# Patient Record
Sex: FEMALE | Race: WHITE | NOT HISPANIC OR LATINO | Employment: FULL TIME | URBAN - METROPOLITAN AREA
[De-identification: names, ages, dates, MRNs, and addresses within clinical notes are randomized per-mention and may not be internally consistent; named-entity substitution may affect disease eponyms.]

---

## 2020-03-17 ENCOUNTER — TRANSCRIBE ORDERS (OUTPATIENT)
Dept: ADMINISTRATIVE | Facility: HOSPITAL | Age: 30
End: 2020-03-17

## 2020-03-17 DIAGNOSIS — N63.15 BREAST LUMP ON RIGHT SIDE AT 9 O'CLOCK POSITION: Primary | ICD-10-CM

## 2020-03-18 ENCOUNTER — HOSPITAL ENCOUNTER (OUTPATIENT)
Dept: ULTRASOUND IMAGING | Facility: HOSPITAL | Age: 30
Discharge: HOME | End: 2020-03-18
Admitting: OBSTETRICS & GYNECOLOGY

## 2020-03-18 DIAGNOSIS — N63.15 BREAST LUMP ON RIGHT SIDE AT 9 O'CLOCK POSITION: ICD-10-CM

## 2020-03-18 PROCEDURE — 76642 ULTRASOUND BREAST LIMITED: CPT

## 2020-03-18 PROCEDURE — 76642 ULTRASOUND BREAST LIMITED: CPT | Performed by: RADIOLOGY

## 2021-04-16 DIAGNOSIS — Z30.41 ENCOUNTER FOR SURVEILLANCE OF CONTRACEPTIVE PILLS: Primary | ICD-10-CM

## 2021-04-16 RX ORDER — NORETHINDRONE ACETATE AND ETHINYL ESTRADIOL 1MG-20(24)
1 KIT ORAL DAILY
Qty: 28 TABLET | Refills: 3 | Status: SHIPPED | OUTPATIENT
Start: 2021-04-16 | End: 2021-05-14

## 2021-04-16 NOTE — TELEPHONE ENCOUNTER
Patient requesting refill of blisovi fe to last until her annual scheduled 5/14. Pharmacy contacted the office but we denied it - she is now scheduled for her annual.

## 2021-05-14 ENCOUNTER — OFFICE VISIT (OUTPATIENT)
Dept: OBSTETRICS AND GYNECOLOGY | Facility: CLINIC | Age: 31
End: 2021-05-14

## 2021-05-14 VITALS
HEIGHT: 68 IN | WEIGHT: 208 LBS | DIASTOLIC BLOOD PRESSURE: 80 MMHG | BODY MASS INDEX: 31.52 KG/M2 | SYSTOLIC BLOOD PRESSURE: 118 MMHG

## 2021-05-14 DIAGNOSIS — Z30.41 ENCOUNTER FOR SURVEILLANCE OF CONTRACEPTIVE PILLS: ICD-10-CM

## 2021-05-14 DIAGNOSIS — Z12.39 ENCOUNTER FOR BREAST CANCER SCREENING USING NON-MAMMOGRAM MODALITY: ICD-10-CM

## 2021-05-14 DIAGNOSIS — R42 DIZZINESS: ICD-10-CM

## 2021-05-14 DIAGNOSIS — Z01.419 WOMEN'S ANNUAL ROUTINE GYNECOLOGICAL EXAMINATION: Primary | ICD-10-CM

## 2021-05-14 LAB
ALBUMIN SERPL-MCNC: 4.3 G/DL (ref 3.5–5.2)
ALBUMIN/GLOB SERPL: 2 G/DL
ALP SERPL-CCNC: 44 U/L (ref 39–117)
ALT SERPL-CCNC: 18 U/L (ref 1–33)
AST SERPL-CCNC: 19 U/L (ref 1–32)
BILIRUB SERPL-MCNC: 0.3 MG/DL (ref 0–1.2)
BUN SERPL-MCNC: 18 MG/DL (ref 6–20)
BUN/CREAT SERPL: 22 (ref 7–25)
CALCIUM SERPL-MCNC: 9.5 MG/DL (ref 8.6–10.5)
CHLORIDE SERPL-SCNC: 107 MMOL/L (ref 98–107)
CO2 SERPL-SCNC: 25.4 MMOL/L (ref 22–29)
CREAT SERPL-MCNC: 0.82 MG/DL (ref 0.57–1)
ERYTHROCYTE [DISTWIDTH] IN BLOOD BY AUTOMATED COUNT: 12 % (ref 12.3–15.4)
GLOBULIN SER CALC-MCNC: 2.2 GM/DL
GLUCOSE SERPL-MCNC: 85 MG/DL (ref 65–99)
HCT VFR BLD AUTO: 43.1 % (ref 34–46.6)
HGB BLD-MCNC: 14.5 G/DL (ref 12–15.9)
MCH RBC QN AUTO: 31.3 PG (ref 26.6–33)
MCHC RBC AUTO-ENTMCNC: 33.6 G/DL (ref 31.5–35.7)
MCV RBC AUTO: 92.9 FL (ref 79–97)
PLATELET # BLD AUTO: 224 10*3/MM3 (ref 140–450)
POTASSIUM SERPL-SCNC: 4.5 MMOL/L (ref 3.5–5.2)
PROT SERPL-MCNC: 6.5 G/DL (ref 6–8.5)
RBC # BLD AUTO: 4.64 10*6/MM3 (ref 3.77–5.28)
SODIUM SERPL-SCNC: 141 MMOL/L (ref 136–145)
TSH SERPL DL<=0.005 MIU/L-ACNC: 1.55 UIU/ML (ref 0.27–4.2)
WBC # BLD AUTO: 5.21 10*3/MM3 (ref 3.4–10.8)

## 2021-05-14 PROCEDURE — 99395 PREV VISIT EST AGE 18-39: CPT | Performed by: OBSTETRICS & GYNECOLOGY

## 2021-05-14 RX ORDER — NORETHINDRONE ACETATE AND ETHINYL ESTRADIOL 1MG-20(21)
1 KIT ORAL DAILY
Qty: 28 TABLET | Refills: 11 | Status: SHIPPED | OUTPATIENT
Start: 2021-05-14 | End: 2022-05-26

## 2021-05-14 RX ORDER — NORETHINDRONE ACETATE AND ETHINYL ESTRADIOL 1MG-20(21)
1 KIT ORAL DAILY
COMMUNITY
End: 2021-05-14 | Stop reason: SDUPTHER

## 2021-05-14 NOTE — PROGRESS NOTES
GYN Annual Exam     CC - Here for annual exam.     Subjective   HPI  Char Villatoro is a 30 y.o. female, , who presents for annual well woman exam. Patient's last menstrual period was 2021..  Periods are regular every 25-35 days, lasting 1 days. The patient uses 1 of tampons/pads per hour., lasting 0 days.  Dysmenorrhea:none.  Patient reports problems with: none.  Partner Status: Marital Status: .  New Partners since last visit: no.  Desires STD Screening: no.  Patient has had one of the three of the HPV vaccine.     Additional OB/GYN History     Current contraception: contraceptive methods: OCP (estrogen/progesterone)  Desires to: continue contraception  Last Pap : 3/17/20  Last Completed Pap Smear     This patient has no relevant Health Maintenance data.        History of abnormal Pap smear: yes - h/o LEEP  Family history of uterine, colon, breast, or ovarian cancer: yes - Breast-Aunt  Previous Mammogram :  no  Performs monthly Self-Breast Exam: yes  Exercises Regularly:yes  Feelings of Anxiety or Depression: no  Tobacco Usage?: No   OB History        0    Para   0    Term   0       0    AB   0    Living   0       SAB   0    TAB   0    Ectopic   0    Molar   0    Multiple   0    Live Births   0                Health Maintenance   Topic Date Due   • ANNUAL PHYSICAL  Never done   • TDAP/TD VACCINES (1 - Tdap) Never done   • HEPATITIS C SCREENING  Never done   • COVID-19 Vaccine (2 - Moderna 2-dose series) 2021   • INFLUENZA VACCINE  2021   • Pneumococcal Vaccine 0-64  Aged Out           The additional following portions of the patient's history were reviewed and updated as appropriate: allergies, current medications, past family history, past medical history, past social history, past surgical history and problem list.    Review of Systems   Constitutional: Negative.    HENT: Negative.    Eyes: Negative.    Respiratory: Negative.    Cardiovascular: Negative.   "  Gastrointestinal: Negative.    Endocrine: Negative.    Genitourinary: Negative.    Musculoskeletal: Negative.    Skin: Negative.    Allergic/Immunologic: Negative.    Neurological: Negative.    Hematological: Negative.    Psychiatric/Behavioral: Negative.        I have reviewed and agree with the HPI, ROS, and historical information as entered above. Eunice Hernandez MD    Objective   /80   Ht 172.7 cm (68\")   Wt 94.3 kg (208 lb)   LMP 04/16/2021   BMI 31.63 kg/m²     Physical Exam  Vitals and nursing note reviewed. Exam conducted with a chaperone present.   Constitutional:       Appearance: She is well-developed.   HENT:      Head: Normocephalic and atraumatic.   Neck:      Thyroid: No thyroid mass or thyromegaly.   Cardiovascular:      Rate and Rhythm: Normal rate and regular rhythm.      Heart sounds: No murmur heard.     Pulmonary:      Effort: Pulmonary effort is normal. No retractions.      Breath sounds: Normal breath sounds. No wheezing, rhonchi or rales.   Chest:      Chest wall: No mass or tenderness.      Breasts:         Right: Normal. No mass, nipple discharge, skin change or tenderness.         Left: Normal. No mass, nipple discharge, skin change or tenderness.   Abdominal:      General: Bowel sounds are normal.      Palpations: Abdomen is soft. Abdomen is not rigid. There is no mass.      Tenderness: There is no abdominal tenderness. There is no guarding.      Hernia: No hernia is present. There is no hernia in the left inguinal area.   Genitourinary:     Labia:         Right: No rash, tenderness or lesion.         Left: No rash, tenderness or lesion.       Vagina: Normal. No vaginal discharge or lesions.      Cervix: No cervical motion tenderness, discharge, lesion or cervical bleeding.      Uterus: Normal. Not enlarged, not fixed and not tender.       Adnexa:         Right: No mass or tenderness.          Left: No mass or tenderness.        Rectum: No external hemorrhoid. "   Musculoskeletal:      Cervical back: Normal range of motion. No muscular tenderness.   Neurological:      Mental Status: She is alert and oriented to person, place, and time.   Psychiatric:         Behavior: Behavior normal.         Assessment/Plan       Encounter Diagnoses   Name Primary?   • Women's annual routine gynecological examination Yes   • Encounter for breast cancer screening using non-mammogram modality    • Encounter for surveillance of contraceptive pills    • Dizziness        Plan     1. Recommended use of Vitamin D replacement and getting adequate calcium in her diet. (1500mg)  2. Reviewed monthly self breast exams.  Instructed to call with lumps, pain, or breast discharge.    3. Reviewed HPV guidelines.  4. OCP - happy on current pills but got switched to 24 day pill and is now having dizziness so will switch her back.  Given this is an unusual side effect will check labs and encouraged her to see PCP if persists. She understands the risks of blood clots, stroke, MI and theoretic increased risks of breast cancer. she does not smoke and has no relevant family h/o blood clots.  5. Reviewed exercise as a preventative health measures.       Eunice Hernandez MD  05/14/2021

## 2021-05-20 DIAGNOSIS — Z01.419 WOMEN'S ANNUAL ROUTINE GYNECOLOGICAL EXAMINATION: ICD-10-CM

## 2022-05-26 ENCOUNTER — OFFICE VISIT (OUTPATIENT)
Dept: OBSTETRICS AND GYNECOLOGY | Facility: CLINIC | Age: 32
End: 2022-05-26

## 2022-05-26 VITALS — HEIGHT: 68 IN | WEIGHT: 216 LBS | BODY MASS INDEX: 32.74 KG/M2

## 2022-05-26 DIAGNOSIS — Z12.39 ENCOUNTER FOR BREAST CANCER SCREENING USING NON-MAMMOGRAM MODALITY: ICD-10-CM

## 2022-05-26 DIAGNOSIS — Z31.69 PRE-CONCEPTION COUNSELING: ICD-10-CM

## 2022-05-26 DIAGNOSIS — Z01.419 WOMEN'S ANNUAL ROUTINE GYNECOLOGICAL EXAMINATION: Primary | ICD-10-CM

## 2022-05-26 DIAGNOSIS — N92.6 MENSTRUAL IRREGULARITY: ICD-10-CM

## 2022-05-26 PROCEDURE — 99395 PREV VISIT EST AGE 18-39: CPT | Performed by: OBSTETRICS & GYNECOLOGY

## 2022-05-26 NOTE — PROGRESS NOTES
GYN Annual Exam     CC - Here for annual exam.     Subjective   HPI  Char Villatoro is a 31 y.o. female, , who presents for annual well woman exam. Patient's last menstrual period was 2022..  Periods are regular every 25-35 days, lasting 5 days. The flow is moderate.  Dysmenorrhea:mild, occurring first 1-2 days of flow.  Patient reports problems with: none.  Partner Status: Marital Status: .  New Partners since last visit: no.  Desires STD Screening: no.  Patient has had the HPV vaccine.     Pt stopped OCP's in Dec and has started trying to conceive.    Additional OB/GYN History     Current contraception: contraceptive methods: None  Desires to: do not start contraception  Last Pap :   Last Completed Pap Smear          PAP SMEAR (Every 3 Years) Next due on 2021  SCANNED - PAP SMEAR    2020  Done - neg              History of abnormal Pap smear: yes - h/o LEEP  Family history of uterine, colon, breast, or ovarian cancer: yes - Breast CA-maunt  Previous Mammogram :  no  Performs monthly Self-Breast Exam: yes  Exercises Regularly:yes  Feelings of Anxiety or Depression: no  Tobacco Usage?: No   OB History        0    Para   0    Term   0       0    AB   0    Living   0       SAB   0    IAB   0    Ectopic   0    Molar   0    Multiple   0    Live Births   0                Health Maintenance   Topic Date Due   • ANNUAL PHYSICAL  Never done   • COVID-19 Vaccine (1) Never done   • TDAP/TD VACCINES (1 - Tdap) Never done   • HEPATITIS C SCREENING  Never done   • Annual Gynecologic Pelvic and Breast Exam  05/15/2022   • INFLUENZA VACCINE  2022   • PAP SMEAR  2024   • Pneumococcal Vaccine 0-64  Aged Out     Past Surgical History:   Procedure Laterality Date   • LEEP             The additional following portions of the patient's history were reviewed and updated as appropriate: allergies, current medications, past family history, past medical history,  "past social history, past surgical history and problem list.    Review of Systems   Constitutional: Negative.    HENT: Negative.    Eyes: Negative.    Respiratory: Negative.    Cardiovascular: Negative.    Gastrointestinal: Negative.    Endocrine: Negative.    Genitourinary: Negative.    Musculoskeletal: Negative.    Skin: Negative.    Allergic/Immunologic: Negative.    Neurological: Negative.    Hematological: Negative.    Psychiatric/Behavioral: Negative.        I have reviewed and agree with the HPI, ROS, and historical information as entered above. Eunice Hernandez MD    Objective   Ht 172.7 cm (68\")   Wt 98 kg (216 lb)   LMP 05/07/2022   BMI 32.84 kg/m²     Physical Exam  Vitals and nursing note reviewed. Exam conducted with a chaperone present.   Constitutional:       Appearance: She is well-developed.   HENT:      Head: Normocephalic and atraumatic.   Neck:      Thyroid: No thyroid mass or thyromegaly.   Cardiovascular:      Rate and Rhythm: Normal rate and regular rhythm.      Heart sounds: No murmur heard.  Pulmonary:      Effort: Pulmonary effort is normal. No retractions.      Breath sounds: Normal breath sounds. No wheezing, rhonchi or rales.   Chest:      Chest wall: No mass or tenderness.   Breasts:      Right: Normal. No mass, nipple discharge, skin change or tenderness.      Left: Normal. No mass, nipple discharge, skin change or tenderness.       Abdominal:      General: Bowel sounds are normal.      Palpations: Abdomen is soft. Abdomen is not rigid. There is no mass.      Tenderness: There is no abdominal tenderness. There is no guarding.      Hernia: No hernia is present. There is no hernia in the left inguinal area.   Genitourinary:     Labia:         Right: No rash, tenderness or lesion.         Left: No rash, tenderness or lesion.       Vagina: Normal. No vaginal discharge or lesions.      Cervix: No cervical motion tenderness, discharge, lesion or cervical bleeding.      Uterus: Normal. Not " enlarged, not fixed and not tender.       Adnexa:         Right: No mass or tenderness.          Left: No mass or tenderness.        Rectum: No external hemorrhoid.   Musculoskeletal:      Cervical back: Normal range of motion. No muscular tenderness.   Neurological:      Mental Status: She is alert and oriented to person, place, and time.   Psychiatric:         Behavior: Behavior normal.         Assessment & Plan       Encounter Diagnoses   Name Primary?   • Women's annual routine gynecological examination Yes   • Encounter for breast cancer screening using non-mammogram modality    • Pre-conception counseling    • Menstrual irregularity        Plan     1. Recommended use of Vitamin D replacement and getting adequate calcium in her diet. (1500mg)  2. Reviewed monthly self breast exams.  Instructed to call with lumps, pain, or breast discharge.    3. Reviewed HPV guidelines.  4. Reviewed exercise as a preventative health measures.   5.  Preconceptual counseling-   She is doing ovulation kits and timed intercourse.  Last period irregular.  WIll check labs.  Discussed process going forward if needed.    Eunice Hernandez MD  05/26/2022

## 2022-05-27 LAB
ERYTHROCYTE [DISTWIDTH] IN BLOOD BY AUTOMATED COUNT: 12.3 % (ref 12.3–15.4)
HCT VFR BLD AUTO: 44.2 % (ref 34–46.6)
HGB BLD-MCNC: 14.7 G/DL (ref 12–15.9)
MCH RBC QN AUTO: 30.6 PG (ref 26.6–33)
MCHC RBC AUTO-ENTMCNC: 33.3 G/DL (ref 31.5–35.7)
MCV RBC AUTO: 91.9 FL (ref 79–97)
PLATELET # BLD AUTO: 210 10*3/MM3 (ref 140–450)
PROLACTIN SERPL-MCNC: 10.4 NG/ML (ref 4.8–23.3)
RBC # BLD AUTO: 4.81 10*6/MM3 (ref 3.77–5.28)
T4 FREE SERPL-MCNC: 0.97 NG/DL (ref 0.93–1.7)
TSH SERPL DL<=0.005 MIU/L-ACNC: 1.29 UIU/ML (ref 0.27–4.2)
WBC # BLD AUTO: 6.94 10*3/MM3 (ref 3.4–10.8)

## 2022-08-29 DIAGNOSIS — Z34.90 PREGNANCY, UNSPECIFIED GESTATIONAL AGE: Primary | ICD-10-CM

## 2022-08-30 ENCOUNTER — INITIAL PRENATAL (OUTPATIENT)
Dept: OBSTETRICS AND GYNECOLOGY | Facility: CLINIC | Age: 32
End: 2022-08-30

## 2022-08-30 VITALS — SYSTOLIC BLOOD PRESSURE: 102 MMHG | DIASTOLIC BLOOD PRESSURE: 58 MMHG | WEIGHT: 213.4 LBS | BODY MASS INDEX: 32.45 KG/M2

## 2022-08-30 DIAGNOSIS — Z3A.08 8 WEEKS GESTATION OF PREGNANCY: ICD-10-CM

## 2022-08-30 DIAGNOSIS — Z34.90 PREGNANCY, UNSPECIFIED GESTATIONAL AGE: ICD-10-CM

## 2022-08-30 DIAGNOSIS — Z36.9 ENCOUNTER FOR ANTENATAL SCREENING: Primary | ICD-10-CM

## 2022-08-30 PROCEDURE — 76817 TRANSVAGINAL US OBSTETRIC: CPT | Performed by: OBSTETRICS & GYNECOLOGY

## 2022-08-30 PROCEDURE — 0501F PRENATAL FLOW SHEET: CPT | Performed by: OBSTETRICS & GYNECOLOGY

## 2022-08-30 NOTE — PROGRESS NOTES
Initial ob visit     CC- Here for care of pregnancy        Char Villatoro is a 31 y.o. female, , who presents for her first obstetrical visit.  Patient's last menstrual period was 2022.    OB History    Para Term  AB Living   1 0 0 0 0 0   SAB IAB Ectopic Molar Multiple Live Births   0 0 0 0 0 0      # Outcome Date GA Lbr Meir/2nd Weight Sex Delivery Anes PTL Lv   1 Current                Initial positive test date : 2022, UPT          Prior obstetric issues: none  Patient's past medical history is significant for: previous LEEP in 2018.  Family history of genetic issues (includes FOB): No  Prior infections concerning in pregnancy (Rash, fever in last 2 weeks): No  Varicella Hx - history of chicken pox  Prior testing for Cystic Fibrosis Carrier or Sickle Cell Trait- No  Prepregnancy BMI - Body mass index is 32.45 kg/m².  History of STD: No  Hx of HSV for patient or partner: No  Ultrasound Today: yes      Additional Pertinent History   Last Pap : 2021 Result: negative HPV: negative  Last Completed Pap Smear          PAP SMEAR (Every 3 Years) Next due on 2021  SCANNED - PAP SMEAR    2020  Done - neg              History of abnormal Pap smear: yes - h/o LEEP in 2018- repeats normal  Family history of uterine, colon, breast, or ovarian cancer: yes - MA- breast CA  Feelings of Anxiety or Depression: no  Tobacco Usage?: No   Alcohol/Drug Use?: No  Over the age of 35 at delivery: No  Desires Genetic Screening: Cell Free DNA      PMH    Current Outpatient Medications:   •  Acetaminophen (TYLENOL PO), Take  by mouth., Disp: , Rfl:   •  doxylamine (UNISOM) 25 MG tablet, Take 25 mg by mouth At Night As Needed for Sleep., Disp: , Rfl:   •  Prenatal Vit-Fe Fumarate-FA (PRENATAL VITAMIN PO), Take  by mouth., Disp: , Rfl:   •  Pyridoxine HCl (VITAMIN B6 PO), Take  by mouth., Disp: , Rfl:      Past Medical History:   Diagnosis Date   • Abnormal Pap smear of  cervix         Past Surgical History:   Procedure Laterality Date   • CERVICAL BIOPSY  W/ LOOP ELECTRODE EXCISION  2018   • LEEP  2018   • WISDOM TOOTH EXTRACTION  2019       Review of Systems   Review of Systems  Patient Reports: Nausea, Vomiting, and Fatigue. Patient states that she vomited 2-3 times last week. Patient is taking Vitamin B6 and Unisom and this helps.  Patient Denies: Spotting, Heavy bleeding and Cramping  All systems reviewed and otherwise normal.    I have reviewed and agree with the HPI, ROS, and historical information as entered above. Eunice Hernandez MD    /58   Wt 96.8 kg (213 lb 6.4 oz)   LMP 2022   BMI 32.45 kg/m²     The additional following portions of the patient's history were reviewed and updated as appropriate: allergies, current medications, past family history, past medical history, past social history, past surgical history and problem list.    Physical Exam  General:  well developed; well nourished  no acute distress   Chest/Respiratory: No labored breathing, normal respiratory effort, normal appearance, no respiratory noises noted   Heart:  normal rate, regular rhythm,  no murmurs, rubs, or gallops   Thyroid: normal to inspection and palpation   Breasts:  Not performed.   Abdomen: soft, non-tender; no masses  no umbilical or inguinal hernias are present  no hepato-splenomegaly   Pelvis: Not performed.        Assessment and Plan    Problem List Items Addressed This Visit    None     Visit Diagnoses     Encounter for  screening    -  Primary    Relevant Orders    Obstetric Panel    Chlamydia trachomatis, Neisseria gonorrhoeae, PCR w/ confirmation - Urine, Urine, Clean Catch    HIV-1 / O / 2 Ag / Antibody 4th Generation    Urinalysis With Microscopic - Urine, Clean Catch    Urine Culture - Urine, Urine, Clean Catch    Urine Drug Screen - Urine, Clean Catch    Pregnancy, unspecified gestational age        8 weeks gestation of pregnancy        Relevant Orders     Obstetric Panel    Chlamydia trachomatis, Neisseria gonorrhoeae, PCR w/ confirmation - Urine, Urine, Clean Catch    HIV-1 / O / 2 Ag / Antibody 4th Generation    Urinalysis With Microscopic - Urine, Clean Catch    Urine Culture - Urine, Urine, Clean Catch    Urine Drug Screen - Urine, Clean Catch          1. Pregnancy at 8w6d  2. Reviewed routine prenatal care with the office and educational materials given  Discussed options for genetic testing including first trimester nuchal translucency screen, genetic disease carrier testing, quadruple screen, and NIPTS  F/u in 4 weeks      Eunice Hernandez MD  08/30/2022

## 2022-09-04 LAB
ABO GROUP BLD: ABNORMAL
AMPHETAMINES UR QL SCN: NEGATIVE NG/ML
APPEARANCE UR: ABNORMAL
BACTERIA #/AREA URNS HPF: ABNORMAL /[HPF]
BACTERIA UR CULT: ABNORMAL
BARBITURATES UR QL SCN: NEGATIVE NG/ML
BASOPHILS # BLD AUTO: 0 X10E3/UL (ref 0–0.2)
BASOPHILS NFR BLD AUTO: 0 %
BENZODIAZ UR QL SCN: NEGATIVE NG/ML
BILIRUB UR QL STRIP: NEGATIVE
BLD GP AB SCN SERPL QL: NEGATIVE
BZE UR QL SCN: NEGATIVE NG/ML
C TRACH RRNA SPEC QL NAA+PROBE: NEGATIVE
CANNABINOIDS UR QL SCN: NEGATIVE NG/ML
CASTS URNS QL MICRO: ABNORMAL /LPF
COLOR UR: YELLOW
CREAT UR-MCNC: 61.8 MG/DL (ref 20–300)
EOSINOPHIL # BLD AUTO: 0.1 X10E3/UL (ref 0–0.4)
EOSINOPHIL NFR BLD AUTO: 1 %
EPI CELLS #/AREA URNS HPF: ABNORMAL /HPF (ref 0–10)
ERYTHROCYTE [DISTWIDTH] IN BLOOD BY AUTOMATED COUNT: 12.2 % (ref 11.7–15.4)
GLUCOSE UR QL STRIP: NEGATIVE
HBV SURFACE AG SERPL QL IA: NEGATIVE
HCT VFR BLD AUTO: 41.9 % (ref 34–46.6)
HCV AB S/CO SERPL IA: <0.1 S/CO RATIO (ref 0–0.9)
HGB BLD-MCNC: 14 G/DL (ref 11.1–15.9)
HGB UR QL STRIP: NEGATIVE
HIV 1+2 AB+HIV1 P24 AG SERPL QL IA: NON REACTIVE
IMM GRANULOCYTES # BLD AUTO: 0 X10E3/UL (ref 0–0.1)
IMM GRANULOCYTES NFR BLD AUTO: 1 %
KETONES UR QL STRIP: NEGATIVE
LABORATORY COMMENT REPORT: NORMAL
LEUKOCYTE ESTERASE UR QL STRIP: ABNORMAL
LYMPHOCYTES # BLD AUTO: 1.7 X10E3/UL (ref 0.7–3.1)
LYMPHOCYTES NFR BLD AUTO: 22 %
MCH RBC QN AUTO: 30.2 PG (ref 26.6–33)
MCHC RBC AUTO-ENTMCNC: 33.4 G/DL (ref 31.5–35.7)
MCV RBC AUTO: 91 FL (ref 79–97)
METHADONE UR QL SCN: NEGATIVE NG/ML
MICRO URNS: ABNORMAL
MONOCYTES # BLD AUTO: 0.6 X10E3/UL (ref 0.1–0.9)
MONOCYTES NFR BLD AUTO: 8 %
N GONORRHOEA RRNA SPEC QL NAA+PROBE: NEGATIVE
NEUTROPHILS # BLD AUTO: 5.3 X10E3/UL (ref 1.4–7)
NEUTROPHILS NFR BLD AUTO: 68 %
NITRITE UR QL STRIP: POSITIVE
OPIATES UR QL SCN: NEGATIVE NG/ML
OTHER ANTIBIOTIC SUSC ISLT: ABNORMAL
OXYCODONE+OXYMORPHONE UR QL SCN: NEGATIVE NG/ML
PCP UR QL: NEGATIVE NG/ML
PH UR STRIP: 7.5 [PH] (ref 5–7.5)
PH UR: 7.8 [PH] (ref 4.5–8.9)
PLATELET # BLD AUTO: 220 X10E3/UL (ref 150–450)
PROPOXYPH UR QL SCN: NEGATIVE NG/ML
PROT UR QL STRIP: NEGATIVE
RBC # BLD AUTO: 4.63 X10E6/UL (ref 3.77–5.28)
RBC #/AREA URNS HPF: ABNORMAL /HPF (ref 0–2)
RH BLD: POSITIVE
RPR SER QL: NON REACTIVE
RUBV IGG SERPL IA-ACNC: <0.9 INDEX
SP GR UR STRIP: 1.01 (ref 1–1.03)
UROBILINOGEN UR STRIP-MCNC: 0.2 MG/DL (ref 0.2–1)
WBC # BLD AUTO: 7.8 X10E3/UL (ref 3.4–10.8)
WBC #/AREA URNS HPF: ABNORMAL /HPF (ref 0–5)

## 2022-09-06 RX ORDER — NITROFURANTOIN 25; 75 MG/1; MG/1
100 CAPSULE ORAL 2 TIMES DAILY
Qty: 14 CAPSULE | Refills: 0 | Status: SHIPPED | OUTPATIENT
Start: 2022-09-06 | End: 2022-09-13

## 2022-09-27 ENCOUNTER — ROUTINE PRENATAL (OUTPATIENT)
Dept: OBSTETRICS AND GYNECOLOGY | Facility: CLINIC | Age: 32
End: 2022-09-27

## 2022-09-27 VITALS — SYSTOLIC BLOOD PRESSURE: 102 MMHG | WEIGHT: 215 LBS | DIASTOLIC BLOOD PRESSURE: 62 MMHG | BODY MASS INDEX: 32.69 KG/M2

## 2022-09-27 DIAGNOSIS — Z34.91 FIRST TRIMESTER PREGNANCY: ICD-10-CM

## 2022-09-27 DIAGNOSIS — O23.40 URINARY TRACT INFECTION IN MOTHER DURING PREGNANCY, ANTEPARTUM: ICD-10-CM

## 2022-09-27 DIAGNOSIS — Z3A.12 12 WEEKS GESTATION OF PREGNANCY: Primary | ICD-10-CM

## 2022-09-27 LAB
GLUCOSE UR STRIP-MCNC: NEGATIVE MG/DL
PROT UR STRIP-MCNC: NEGATIVE MG/DL

## 2022-09-27 PROCEDURE — 0502F SUBSEQUENT PRENATAL CARE: CPT | Performed by: OBSTETRICS & GYNECOLOGY

## 2022-09-27 RX ORDER — CALCIUM CARBONATE 200(500)MG
1 TABLET,CHEWABLE ORAL DAILY
COMMUNITY
End: 2023-02-28

## 2022-09-27 NOTE — PROGRESS NOTES
OB FOLLOW UP  CC- Here for care of pregnancy        Char Villatoro is a 31 y.o.  12w6d patient being seen today for her obstetrical follow up visit. Patient reports headache (that is relieved by Tylenol or rest), heartburn (she is taking OTC medication for treatment currently), nausea and vomiting. Patient reports vomiting 1 time per day.    Her prenatal care is complicated by (and status) : None  There is no problem list on file for this patient.      Desires genetic testing?: Yes with Gender  Flu Status: Will do in office at next appointment  Ultrasound Today: No    ROS -   Patient Reports : see above  Patient Denies: Loss of Fluid, Vaginal Spotting and Vision Changes  Fetal Movement : N/A  All other systems reviewed and are negative.     The additional following portions of the patient's history were reviewed and updated as appropriate: allergies and current medications.    I have reviewed and agree with the HPI, ROS, and historical information as entered above. Eunice Hernandez MD    /62   Wt 97.5 kg (215 lb)   LMP 2022   BMI 32.69 kg/m²         EXAM:     Prenatal Vitals  BP: 102/62  Weight: 97.5 kg (215 lb)            FHT present    Urine Glucose Read-only: Negative  Urine Protein Read-only: Negative       Assessment and Plan    Problem List Items Addressed This Visit    None     Visit Diagnoses     12 weeks gestation of pregnancy    -  Primary    Relevant Orders    POC Urinalysis Dipstick (Completed)    Urinary tract infection in mother during pregnancy, antepartum        Relevant Orders    Urine Culture - Urine, Urine, Clean Catch    First trimester pregnancy        Relevant Orders    [id number] PLUS Core+SCA+ESS - Blood,          1. Pregnancy at 12w6d  2. Labs reviewed from New OB Visit.  3. Counseled on genetic testing, carrier status and option for NT screen  4. Activity and Exercise discussed.  5. Patient is on Prenatal vitamins  Return in about 4 weeks (around  10/25/2022).    Eunice Hernandez MD  09/27/2022

## 2022-09-29 LAB
BACTERIA UR CULT: NORMAL
BACTERIA UR CULT: NORMAL

## 2022-10-27 ENCOUNTER — ROUTINE PRENATAL (OUTPATIENT)
Dept: OBSTETRICS AND GYNECOLOGY | Facility: CLINIC | Age: 32
End: 2022-10-27

## 2022-10-27 VITALS — DIASTOLIC BLOOD PRESSURE: 64 MMHG | BODY MASS INDEX: 33.6 KG/M2 | SYSTOLIC BLOOD PRESSURE: 102 MMHG | WEIGHT: 221 LBS

## 2022-10-27 DIAGNOSIS — Z36.89 ENCOUNTER FOR FETAL ANATOMIC SURVEY: ICD-10-CM

## 2022-10-27 DIAGNOSIS — Z3A.17 17 WEEKS GESTATION OF PREGNANCY: Primary | ICD-10-CM

## 2022-10-27 LAB
GLUCOSE UR STRIP-MCNC: NEGATIVE MG/DL
PROT UR STRIP-MCNC: NEGATIVE MG/DL

## 2022-10-27 PROCEDURE — 0502F SUBSEQUENT PRENATAL CARE: CPT | Performed by: OBSTETRICS & GYNECOLOGY

## 2022-10-27 NOTE — PROGRESS NOTES
OB FOLLOW UP  CC- Here for care of pregnancy        Char Villatoro is a 32 y.o.  17w1d patient being seen today for her obstetrical follow up visit. Patient reports no complaints.    Her prenatal care is complicated by (and status) : None  There is no problem list on file for this patient.      Flu Status: Desires at future appt Out of stock in office today  Ultrasound Today: No    AFP: declines    ROS -   Patient Reports : No Problems  Patient Denies: Loss of Fluid, Vaginal Spotting, Vision Changes, Headaches, Nausea , Vomiting , Contractions and Epigastric pain  Fetal Movement : absent  All other systems reviewed and are negative.       The additional following portions of the patient's history were reviewed and updated as appropriate: allergies, current medications, past family history, past medical history, past social history, past surgical history and problem list.    I have reviewed and agree with the HPI, ROS, and historical information as entered above. Eunice Hernandez MD        EXAM:     Prenatal Vitals  BP: 102/64  Weight: 100 kg (221 lb)        Urine Glucose Read-only: Negative  Urine Protein Read-only: Negative           Assessment and Plan    Problem List Items Addressed This Visit    None  Visit Diagnoses     17 weeks gestation of pregnancy    -  Primary    Relevant Orders    POC Urinalysis Dipstick (Completed)          1. Pregnancy at 17w1d  2. Declines AFP  3. Fetal status reassuring.   4. Counseled on MSAFP alone in relation to OTD and placental issues.    5. Anatomy scan next visit.   6. Activity and Exercise discussed.  7. Patient is on Prenatal vitamins      Eunice Hernandez MD  10/27/2022

## 2022-11-17 ENCOUNTER — ROUTINE PRENATAL (OUTPATIENT)
Dept: OBSTETRICS AND GYNECOLOGY | Facility: CLINIC | Age: 32
End: 2022-11-17

## 2022-11-17 VITALS — BODY MASS INDEX: 34.73 KG/M2 | DIASTOLIC BLOOD PRESSURE: 78 MMHG | SYSTOLIC BLOOD PRESSURE: 110 MMHG | WEIGHT: 228.4 LBS

## 2022-11-17 DIAGNOSIS — Z36.2 ENCOUNTER FOR FOLLOW-UP ULTRASOUND OF FETAL ANATOMY: ICD-10-CM

## 2022-11-17 DIAGNOSIS — Z34.02 ENCOUNTER FOR SUPERVISION OF NORMAL FIRST PREGNANCY IN SECOND TRIMESTER: Primary | ICD-10-CM

## 2022-11-17 DIAGNOSIS — Z3A.20 20 WEEKS GESTATION OF PREGNANCY: ICD-10-CM

## 2022-11-17 PROBLEM — Z34.90 PREGNANCY: Status: ACTIVE | Noted: 2022-11-17

## 2022-11-17 LAB
GLUCOSE UR STRIP-MCNC: NEGATIVE MG/DL
PROT UR STRIP-MCNC: NEGATIVE MG/DL

## 2022-11-17 PROCEDURE — 90686 IIV4 VACC NO PRSV 0.5 ML IM: CPT | Performed by: NURSE PRACTITIONER

## 2022-11-17 PROCEDURE — 0502F SUBSEQUENT PRENATAL CARE: CPT | Performed by: NURSE PRACTITIONER

## 2022-11-17 PROCEDURE — 90471 IMMUNIZATION ADMIN: CPT | Performed by: NURSE PRACTITIONER

## 2022-11-17 NOTE — PROGRESS NOTES
OB FOLLOW UP  CC- Here for care of pregnancy        Char Villatoro is a 32 y.o.  20w1d patient being seen today for her obstetrical follow up visit. Patient reports no complaints..     Her prenatal care is complicated by (and status) : None  Patient Active Problem List   Diagnosis   • Pregnancy       Flu Status: Will give in office today  Ultrasound Today: Yes    ROS -   Patient Reports : No Problems  Patient Denies: Loss of Fluid, Vaginal Spotting, Vision Changes, Headaches, Nausea , Vomiting , Contractions and Epigastric pain  Fetal Movement : normal  All other systems reviewed and are negative.       The additional following portions of the patient's history were reviewed and updated as appropriate: allergies, current medications, past family history, past medical history, past social history, past surgical history and problem list.    I have reviewed and agree with the HPI, ROS, and historical information as entered above. Magnolia Guy, APRN    /78   Wt 104 kg (228 lb 6.4 oz)   LMP 2022   BMI 34.73 kg/m²       EXAM:     Prenatal Vitals  BP: 110/78  Weight: 104 kg (228 lb 6.4 oz)              Urine Glucose Read-only: Negative  Urine Protein Read-only: Negative       Assessment and Plan    Problem List Items Addressed This Visit        Gravid and     Pregnancy   Other Visit Diagnoses     Encounter for supervision of normal first pregnancy in second trimester    -  Primary    Encounter for follow-up ultrasound of fetal anatomy        Relevant Orders    US Ob Follow Up Transabdominal Approach          1. Pregnancy at 20w1d  2. Anatomy scan today is incomplete, follow up in 4 weeks for additional views. Anatomy that was visualized was within normal limits.  3. Fetal status reassuring.   4. Activity and Exercise discussed.  5. U/S ordered at follow up  6. Patient is on Prenatal vitamins  Return in about 4 weeks (around 12/15/2022) for ANDRE BOB, Ultrasound STEPHANIE CORTES  Malena, APRN  11/17/2022

## 2022-12-20 ENCOUNTER — ROUTINE PRENATAL (OUTPATIENT)
Dept: OBSTETRICS AND GYNECOLOGY | Facility: CLINIC | Age: 32
End: 2022-12-20

## 2022-12-20 VITALS — WEIGHT: 238 LBS | BODY MASS INDEX: 36.19 KG/M2 | DIASTOLIC BLOOD PRESSURE: 66 MMHG | SYSTOLIC BLOOD PRESSURE: 104 MMHG

## 2022-12-20 DIAGNOSIS — Z34.02 FIRST PREGNANCY, SECOND TRIMESTER: Primary | ICD-10-CM

## 2022-12-20 LAB
GLUCOSE UR STRIP-MCNC: NEGATIVE MG/DL
PROT UR STRIP-MCNC: NEGATIVE MG/DL

## 2022-12-20 PROCEDURE — 0502F SUBSEQUENT PRENATAL CARE: CPT | Performed by: OBSTETRICS & GYNECOLOGY

## 2022-12-20 NOTE — PROGRESS NOTES
OB FOLLOW UP  CC- Here for care of pregnancy        Char Villatoro is a 32 y.o.  24w6d patient being seen today for her obstetrical follow up visit. Patient reports no complaints.    Her prenatal care is complicated by (and status) :  Patient Active Problem List   Diagnosis   • Pregnancy       Flu Status: Already given in current flu season  Ultrasound Today: Yes    ROS -   Patient Reports : No Problems  Patient Denies: Loss of Fluid, Vaginal Spotting, Vision Changes, Headaches, Nausea , Vomiting , Contractions and Epigastric pain  Fetal Movement : normal  All other systems reviewed and are negative.       The additional following portions of the patient's history were reviewed and updated as appropriate: allergies and current medications.    I have reviewed and agree with the HPI, ROS, and historical information as entered above. Eunice Hernandez MD    /66   Wt 108 kg (238 lb)   LMP 2022   BMI 36.19 kg/m²       EXAM:     Prenatal Vitals  BP: 104/66  Weight: 108 kg (238 lb)   Fetal Heart Rate: positive               Urine Glucose Read-only: Negative  Urine Protein Read-only: Negative       Assessment and Plan    Problem List Items Addressed This Visit    None  Visit Diagnoses     First pregnancy, second trimester    -  Primary          1. Pregnancy at 24w6d  2. Fetal status reassuring.  3. anatomy scan completed today and within normal limits.  4. 1 hour gtt, CBC, Antibody screen and TDAP next visit. Instructions given  5. Discussed/encouraged TDAP vaccination after 28 weeks  6. Activity and Exercise discussed.  Return in about 3 weeks (around 1/10/2023) for One hour glucola.    Eunice Hernandez MD  2022

## 2023-01-06 ENCOUNTER — ROUTINE PRENATAL (OUTPATIENT)
Dept: OBSTETRICS AND GYNECOLOGY | Facility: CLINIC | Age: 33
End: 2023-01-06
Payer: COMMERCIAL

## 2023-01-06 VITALS — BODY MASS INDEX: 36.49 KG/M2 | WEIGHT: 240 LBS | DIASTOLIC BLOOD PRESSURE: 68 MMHG | SYSTOLIC BLOOD PRESSURE: 120 MMHG

## 2023-01-06 DIAGNOSIS — Z34.02 FIRST PREGNANCY, SECOND TRIMESTER: Primary | ICD-10-CM

## 2023-01-06 LAB
GLUCOSE UR STRIP-MCNC: NEGATIVE MG/DL
PROT UR STRIP-MCNC: NEGATIVE MG/DL

## 2023-01-06 PROCEDURE — 90471 IMMUNIZATION ADMIN: CPT | Performed by: OBSTETRICS & GYNECOLOGY

## 2023-01-06 PROCEDURE — 90715 TDAP VACCINE 7 YRS/> IM: CPT | Performed by: OBSTETRICS & GYNECOLOGY

## 2023-01-06 PROCEDURE — 0502F SUBSEQUENT PRENATAL CARE: CPT | Performed by: OBSTETRICS & GYNECOLOGY

## 2023-01-06 RX ORDER — FAMOTIDINE 10 MG
TABLET ORAL
COMMUNITY
End: 2023-02-28

## 2023-01-06 RX ORDER — LANSOPRAZOLE 30 MG/1
30 CAPSULE, DELAYED RELEASE ORAL DAILY
Qty: 30 CAPSULE | Refills: 1 | Status: SHIPPED | OUTPATIENT
Start: 2023-01-06 | End: 2023-04-03

## 2023-01-06 RX ORDER — CETIRIZINE HYDROCHLORIDE 10 MG/1
TABLET ORAL
COMMUNITY
End: 2023-04-03

## 2023-01-06 NOTE — PROGRESS NOTES
OB FOLLOW UP  CC- Here for care of pregnancy        Char Villatoro is a 32 y.o.  27w2d patient being seen today for her obstetrical follow up. Patient reports heartburn (she is taking OTC medication for treatment currently).     Patient undergoing Glucola testing today. She is due for her testing at 11:15AM. 28wk teaching done today.    MBT: A+  Rhogam: not indicated  28 week packet: reviewed with patient , counseled on fetal movement , pediatrician list reviewed, breast pump discussed and childbirth classes reviewed  TDAP: given today  Flu Status: Already given in current flu season  Ultrasound Today: No    Her prenatal care is complicated by (and status) :    Patient Active Problem List   Diagnosis   • Pregnancy         ROS -   Patient Reports : see above  Patient Denies: Loss of Fluid, Vaginal Spotting, Vision Changes, Headaches, Nausea , Vomiting , Contractions and Epigastric pain  Fetal Movement : normal    The additional following portions of the patient's history were reviewed and updated as appropriate: allergies and current medications.    I have reviewed and agree with the HPI, ROS, and historical information as entered above. Eunice Hernandez MD    /68   Wt 109 kg (240 lb)   LMP 2022   BMI 36.49 kg/m²         EXAM:     Prenatal Vitals  BP: 120/68  Weight: 109 kg (240 lb)   Fetal Heart Rate: pos               Urine Glucose Read-only: Negative  Urine Protein Read-only: Negative       Assessment and Plan    Problem List Items Addressed This Visit    None  Visit Diagnoses     First pregnancy, second trimester    -  Primary    Relevant Orders    Antibody Screen    CBC (No Diff)    Gestational Screen 1 Hr ([facility])    Tdap Vaccine Greater Than or Equal To 8yo IM (Completed)    POC Urinalysis Dipstick (Completed)          1. Pregnancy at 27w2d  2. 1 hr Glucola, CBC, and antibody screen today  and TDAP given today  3. Fetal movement/PTL or Labor precautions  4. Activity and Exercise  discussed.      Eunice Hernandez MD  01/06/2023

## 2023-01-07 LAB
BLD GP AB SCN SERPL QL: NEGATIVE
ERYTHROCYTE [DISTWIDTH] IN BLOOD BY AUTOMATED COUNT: 11.8 % (ref 12.3–15.4)
GLUCOSE 1H P 50 G GLC PO SERPL-MCNC: 85 MG/DL (ref 65–139)
HCT VFR BLD AUTO: 35.9 % (ref 34–46.6)
HGB BLD-MCNC: 11.9 G/DL (ref 12–15.9)
MCH RBC QN AUTO: 30.1 PG (ref 26.6–33)
MCHC RBC AUTO-ENTMCNC: 33.1 G/DL (ref 31.5–35.7)
MCV RBC AUTO: 90.9 FL (ref 79–97)
PLATELET # BLD AUTO: 214 10*3/MM3 (ref 140–450)
RBC # BLD AUTO: 3.95 10*6/MM3 (ref 3.77–5.28)
WBC # BLD AUTO: 13.05 10*3/MM3 (ref 3.4–10.8)

## 2023-01-10 ENCOUNTER — TELEPHONE (OUTPATIENT)
Dept: OBSTETRICS AND GYNECOLOGY | Facility: CLINIC | Age: 33
End: 2023-01-10
Payer: COMMERCIAL

## 2023-01-10 NOTE — TELEPHONE ENCOUNTER
PA started yesterday 1/9/23 and is still pending for Lansoprazole 30 mg Dr capsules. Her Health plan prefers OMEPRAZOLE CAP 20MG and   PANTOPRAZOLE TAB 40MG

## 2023-01-10 NOTE — TELEPHONE ENCOUNTER
Caller: Char Villatoro    Relationship: Self    Best call back number:  [phone]    What is the best time to reach you: TOMORROW, BUT MAY CALL ANYTIME AND LEAVE V/M. IS IN WORK TRAINING REST OF DAY.      What was the call regarding: RETURNING [facility] CALL.     Do you require a callback: YES

## 2023-01-11 NOTE — TELEPHONE ENCOUNTER
Pt did call back. PA still pending for Prevacid. Dr. Hernandez approved Protonix 40MG since it is preferred. [facility]

## 2023-01-12 ENCOUNTER — TELEPHONE (OUTPATIENT)
Dept: OBSTETRICS AND GYNECOLOGY | Facility: CLINIC | Age: 33
End: 2023-01-12
Payer: COMMERCIAL

## 2023-01-12 NOTE — TELEPHONE ENCOUNTER
Caller: Char Villatoro    Relationship: Self    Best call back number: [phone]    What is the best time to reach you: ANY AND MAY LEAVE V/M    Do you know the name of the person who called: ELLIS    What was the call regarding: RETURNING [employer] CALL AND ALSO INQUIRING BEST WAY TO GET FMLA PAPERS FILLED OUT, EITHER BRINGING IN, FAX, EMAIL?    Do you require a callback: YES

## 2023-01-13 NOTE — TELEPHONE ENCOUNTER
Spoke with pt. Pt states over the counter heartburn medication has been working for her and she does not need a prescription. Told pt I will tell Eddie.

## 2023-01-25 ENCOUNTER — TELEPHONE (OUTPATIENT)
Dept: OBSTETRICS AND GYNECOLOGY | Facility: CLINIC | Age: 33
End: 2023-01-25

## 2023-01-25 NOTE — TELEPHONE ENCOUNTER
Caller: ISADORA GREGORY    Relationship: SELF    Best call back number: [phone]    What form or medical record are you requesting: SHORT TERM DISABILITY PAPERWORK DROPPED OFF A FEW WEEKS AGO    Who is requesting this form or medical record from you: DARIUS CASIANO    How would you like to receive the form or medical records (pick-up, mail, fax): FAX  If fax, what is the fax number: FAX NUMBER IS LISTED ON PAPERWORK     Timeframe paperwork needed: ASAP (PT HAS TIL THE END OF THE WEEK BEFORE THE CLOSE THE CASE)    Additional notes: PT STATES SHE NEEDS PROJECTED DATE OF BIRTH TO BE FILLED OUT SO THEY WON'T CLOSE THE CASE.

## 2023-02-01 ENCOUNTER — ROUTINE PRENATAL (OUTPATIENT)
Dept: OBSTETRICS AND GYNECOLOGY | Facility: CLINIC | Age: 33
End: 2023-02-01
Payer: COMMERCIAL

## 2023-02-01 VITALS — BODY MASS INDEX: 37.43 KG/M2 | WEIGHT: 246.2 LBS | SYSTOLIC BLOOD PRESSURE: 112 MMHG | DIASTOLIC BLOOD PRESSURE: 60 MMHG

## 2023-02-01 DIAGNOSIS — Z3A.31 31 WEEKS GESTATION OF PREGNANCY: ICD-10-CM

## 2023-02-01 DIAGNOSIS — O26.849 UTERINE SIZE DATE DISCREPANCY PREGNANCY: ICD-10-CM

## 2023-02-01 DIAGNOSIS — Z34.00 SUPERVISION OF NORMAL FIRST PREGNANCY, ANTEPARTUM: Primary | ICD-10-CM

## 2023-02-01 LAB
GLUCOSE UR STRIP-MCNC: NEGATIVE MG/DL
PROT UR STRIP-MCNC: NEGATIVE MG/DL

## 2023-02-01 PROCEDURE — 0502F SUBSEQUENT PRENATAL CARE: CPT | Performed by: NURSE PRACTITIONER

## 2023-02-01 NOTE — PROGRESS NOTES
OB FOLLOW UP  CC- Here for care of pregnancy        Char Villatoro is a 32 y.o.  31w0d patient being seen today for her obstetrical follow up visit. Patient reports heartburn, she is using tums, pepcid. She was given Prevacid at her last visit and states that it significantly helped. Also reports occasional hip pain     Her prenatal care is complicated by (and status) :    Patient Active Problem List   Diagnosis   • Pregnancy       Flu Status: Already given in current flu season  TDAP status: received at last visit  28 week labs: Reviewed and Labs show anemia. She is not taking additional iron supplement.  Ultrasound Today: No   Non Stress Test: No.    ROS -   Patient Reports : Heartburn and hip pain  Patient Denies: Loss of Fluid, Vaginal Spotting, Vision Changes, Headaches, Nausea , Vomiting , Contractions and Epigastric pain  Fetal Movement : normal  All other systems reviewed and are negative.       The additional following portions of the patient's history were reviewed and updated as appropriate: allergies, current medications, past family history, past medical history, past social history, past surgical history and problem list.    I have reviewed and agree with the HPI, ROS, and historical information as entered above. Magnolia Guy, APRN    /60   Wt 112 kg (246 lb 3.2 oz)   LMP 2022   BMI 37.43 kg/m²       EXAM:     Prenatal Vitals  BP: 112/60  Weight: 112 kg (246 lb 3.2 oz)   Fetal Heart Rate: pos      Fundal Height (cm): 32 cm        Urine Glucose Read-only: Negative  Urine Protein Read-only: Negative       Assessment and Plan    Problem List Items Addressed This Visit        Gravid and     Pregnancy    Relevant Orders    US Ob Follow Up Transabdominal Approach   Other Visit Diagnoses     Supervision of normal first pregnancy, antepartum    -  Primary    Relevant Orders    POC Urinalysis Dipstick (Completed)    Uterine size date discrepancy pregnancy         Relevant Orders    US Ob Follow Up Transabdominal Approach          1. Pregnancy at 31w0d  2. Fetal status reassuring.  3. 28 week labs reviewed.    4. Activity and Exercise discussed.  5. Fetal movement/PTL or Labor precautions  Return in about 2 weeks (around 2/15/2023) for Ultrasound LOS.   Advised iron supplement every other day. Can continue prevacid OTC daily until delivery.     Magnolia Guy, APRN  02/01/2023

## 2023-02-15 ENCOUNTER — ROUTINE PRENATAL (OUTPATIENT)
Dept: OBSTETRICS AND GYNECOLOGY | Facility: CLINIC | Age: 33
End: 2023-02-15
Payer: COMMERCIAL

## 2023-02-15 VITALS — DIASTOLIC BLOOD PRESSURE: 72 MMHG | BODY MASS INDEX: 37.71 KG/M2 | SYSTOLIC BLOOD PRESSURE: 110 MMHG | WEIGHT: 248 LBS

## 2023-02-15 DIAGNOSIS — Z34.93 PRENATAL CARE IN THIRD TRIMESTER: Primary | ICD-10-CM

## 2023-02-15 LAB
GLUCOSE UR STRIP-MCNC: NEGATIVE MG/DL
PROT UR STRIP-MCNC: NEGATIVE MG/DL

## 2023-02-15 PROCEDURE — 0502F SUBSEQUENT PRENATAL CARE: CPT | Performed by: OBSTETRICS & GYNECOLOGY

## 2023-02-15 NOTE — PROGRESS NOTES
OB FOLLOW UP  CC- Here for care of pregnancy        Char Villatoro is a 32 y.o.  33w0d patient being seen today for her obstetrical follow up visit. Patient reports swelling in the upper and lower extremities. It is Non-Pitting..     Her prenatal care is complicated by (and status) : None  Patient Active Problem List   Diagnosis   • Pregnancy         Ultrasound Today: Yes  Non Stress Test: No.      ROS -   Patient Reports : No Problems  Patient Denies: Loss of Fluid, Vaginal Spotting, Vision Changes, Headaches, Nausea , Vomiting , Contractions and Epigastric pain  Fetal Movement : normal  All other systems reviewed and are negative.       The additional following portions of the patient's history were reviewed and updated as appropriate: allergies, current medications, past family history, past medical history, past social history, past surgical history and problem list.    I have reviewed and agree with the HPI, ROS, and historical information as entered above. Eunice Hernandez MD    /72   Wt 112 kg (248 lb)   LMP 2022   BMI 37.71 kg/m²       EXAM:     Prenatal Vitals  BP: 110/72  Weight: 112 kg (248 lb)   Fetal Heart Rate: 142               Urine Glucose Read-only: Negative  Urine Protein Read-only: Negative       Assessment and Plan    Problem List Items Addressed This Visit    None  Visit Diagnoses     Prenatal care in third trimester    -  Primary    Relevant Orders    POC Urinalysis Dipstick (Completed)          1. Pregnancy at 33w0d  2. Fetal status reassuring.   3. Activity and Exercise discussed.  4. Fetal movement/PTL or Labor precautions   5. U/S reviewed.  RTC 2 weeks    Eunice Hernandez MD  02/15/2023

## 2023-02-28 ENCOUNTER — ROUTINE PRENATAL (OUTPATIENT)
Dept: OBSTETRICS AND GYNECOLOGY | Facility: CLINIC | Age: 33
End: 2023-02-28
Payer: COMMERCIAL

## 2023-02-28 VITALS — DIASTOLIC BLOOD PRESSURE: 64 MMHG | SYSTOLIC BLOOD PRESSURE: 112 MMHG | WEIGHT: 251.4 LBS | BODY MASS INDEX: 38.23 KG/M2

## 2023-02-28 DIAGNOSIS — Z34.93 PRENATAL CARE IN THIRD TRIMESTER: Primary | ICD-10-CM

## 2023-02-28 LAB
GLUCOSE UR STRIP-MCNC: NEGATIVE MG/DL
PROT UR STRIP-MCNC: NEGATIVE MG/DL

## 2023-02-28 PROCEDURE — 0502F SUBSEQUENT PRENATAL CARE: CPT | Performed by: OBSTETRICS & GYNECOLOGY

## 2023-02-28 NOTE — PROGRESS NOTES
OB FOLLOW UP  CC- Here for care of pregnancy        Char Villatoro is a 32 y.o.  34w6d patient being seen today for her obstetrical follow up visit. Patient reports swelling in both lower extremities. It is Pitting, trace., low back pain. She denies dysuria. and heartburn. She is taking OTC medication for treatment currently.     Her prenatal care is complicated by (and status) :  Patient Active Problem List   Diagnosis   • Pregnancy       Flu Status: Already given in current flu season  Ultrasound Today: No  Non Stress Test: No.      ROS -   Patient Reports : see above  Patient Denies: Loss of Fluid, Vaginal Spotting, Vision Changes, Headaches, Nausea , Vomiting , Contractions and Epigastric pain  Fetal Movement : normal  All other systems reviewed and are negative.       The additional following portions of the patient's history were reviewed and updated as appropriate: allergies and current medications.    I have reviewed and agree with the HPI, ROS, and historical information as entered above. Eunice Hernandez MD    /64   Wt 114 kg (251 lb 6.4 oz)   LMP 2022   BMI 38.23 kg/m²       EXAM:     Prenatal Vitals  BP: 112/64  Weight: 114 kg (251 lb 6.4 oz)                   Urine Glucose Read-only: Negative  Urine Protein Read-only: Negative       Assessment and Plan    Problem List Items Addressed This Visit    None  Visit Diagnoses     Prenatal care in third trimester    -  Primary    Relevant Orders    POC Urinalysis Dipstick (Completed)          1. Pregnancy at 34w6d  2. Fetal status reassuring.   3. Activity and Exercise discussed.  4. GBS 36-38 weeks  Return in about 1 week (around 3/7/2023).    Eunice Hernandez MD  2023

## 2023-03-09 ENCOUNTER — LAB (OUTPATIENT)
Dept: LAB | Facility: HOSPITAL | Age: 33
End: 2023-03-09
Payer: COMMERCIAL

## 2023-03-09 ENCOUNTER — ROUTINE PRENATAL (OUTPATIENT)
Dept: OBSTETRICS AND GYNECOLOGY | Facility: CLINIC | Age: 33
End: 2023-03-09
Payer: COMMERCIAL

## 2023-03-09 VITALS — DIASTOLIC BLOOD PRESSURE: 80 MMHG | BODY MASS INDEX: 38.5 KG/M2 | SYSTOLIC BLOOD PRESSURE: 114 MMHG | WEIGHT: 253.2 LBS

## 2023-03-09 DIAGNOSIS — Z34.93 PRENATAL CARE IN THIRD TRIMESTER: Primary | ICD-10-CM

## 2023-03-09 DIAGNOSIS — Z34.93 PRENATAL CARE IN THIRD TRIMESTER: ICD-10-CM

## 2023-03-09 LAB
GLUCOSE UR STRIP-MCNC: NEGATIVE MG/DL
PROT UR STRIP-MCNC: NEGATIVE MG/DL

## 2023-03-09 PROCEDURE — 0502F SUBSEQUENT PRENATAL CARE: CPT | Performed by: OBSTETRICS & GYNECOLOGY

## 2023-03-09 PROCEDURE — 87081 CULTURE SCREEN ONLY: CPT

## 2023-03-09 NOTE — PROGRESS NOTES
OB FOLLOW UP  CC- Here for care of pregnancy        Char Villatoro is a 32 y.o.  36w1d patient being seen today for her obstetrical follow up visit. Patient reports occasional hip and low back pain. She denies dysuria.     Her prenatal care is complicated by (and status) :   Patient Active Problem List   Diagnosis   • Pregnancy       GBS Status: Done Today. She is not allergic to PCN.    No Known Allergies       Flu Status: Already given in current flu season  Her Delivery Plan is: [facility] 39 week induction   US today: no  Non Stress Test: No.      ROS -   Patient Reports : see above  Patient Denies: Loss of Fluid, Vaginal Spotting, Vision Changes, Headaches, Nausea , Vomiting , Contractions and Epigastric pain  Fetal Movement : normal  All other systems reviewed and are negative.       The additional following portions of the patient's history were reviewed and updated as appropriate: allergies and current medications.    I have reviewed and agree with the HPI, ROS, and historical information as entered above. Eunice Hernandez MD      EXAM:     Prenatal Vitals  BP: 114/80  Weight: 115 kg (253 lb 3.2 oz)   Fetal Heart Rate: pos              Urine Glucose Read-only: Negative  Urine Protein Read-only: Negative       Assessment and Plan    Problem List Items Addressed This Visit    None  Visit Diagnoses     Prenatal care in third trimester    -  Primary    Relevant Orders    POC Urinalysis Dipstick (Completed)    Group B Streptococcus Culture - Swab, Vaginal/Rectum          1. Pregnancy at 36w1d  2. Fetal status reassuring.   3. Reviewed Pre-eclampsia signs/symptoms   4. Schedule 39 week induction.  5. Delivery options reviewed with patient  6. Signs of labor reviewed  7. Kick counts reviewed  8. Activity and Exercise discussed.  Return in about 1 week (around 3/16/2023).    Eunice Hernandez MD  2023

## 2023-03-12 LAB — BACTERIA SPEC AEROBE CULT: NORMAL

## 2023-03-13 DIAGNOSIS — Z34.93 PRENATAL CARE IN THIRD TRIMESTER: Primary | ICD-10-CM

## 2023-03-14 ENCOUNTER — ROUTINE PRENATAL (OUTPATIENT)
Dept: OBSTETRICS AND GYNECOLOGY | Facility: CLINIC | Age: 33
End: 2023-03-14
Payer: COMMERCIAL

## 2023-03-14 VITALS — BODY MASS INDEX: 38.83 KG/M2 | DIASTOLIC BLOOD PRESSURE: 70 MMHG | WEIGHT: 255.4 LBS | SYSTOLIC BLOOD PRESSURE: 112 MMHG

## 2023-03-14 DIAGNOSIS — Z34.93 PRENATAL CARE IN THIRD TRIMESTER: Primary | ICD-10-CM

## 2023-03-14 LAB
GLUCOSE UR STRIP-MCNC: NEGATIVE MG/DL
PROT UR STRIP-MCNC: NEGATIVE MG/DL

## 2023-03-14 PROCEDURE — 0502F SUBSEQUENT PRENATAL CARE: CPT | Performed by: OBSTETRICS & GYNECOLOGY

## 2023-03-14 NOTE — PROGRESS NOTES
OB FOLLOW UP  CC- Here for care of pregnancy        Char Villatoro is a 32 y.o.  36w6d patient being seen today for her obstetrical follow up visit. Patient reports no complaints..     Her prenatal care is complicated by (and status) : None  Patient Active Problem List   Diagnosis   • Pregnancy       GBS Status: was already done and is negative.    No Known Allergies       Flu Status: Already given in current flu season  Her Delivery Plan is: [facility] IOL at 39wks. Scheduled    US today: yes  Non Stress Test: No.      ROS -   Patient Reports : No Problems  Patient Denies: Loss of Fluid, Vaginal Spotting, Vision Changes, Headaches, Contractions and Epigastric pain  Fetal Movement : normal  All other systems reviewed and are negative.       The additional following portions of the patient's history were reviewed and updated as appropriate: allergies, current medications, past family history, past medical history, past social history, past surgical history and problem list.    I have reviewed and agree with the HPI, ROS, and historical information as entered above. Eunice Hernandez MD      EXAM:     Prenatal Vitals  BP: 112/70  Weight: 116 kg (255 lb 6.4 oz)   Fetal Heart Rate: 140              Urine Glucose Read-only: Negative  Urine Protein Read-only: Negative       Assessment and Plan    Problem List Items Addressed This Visit    None  Visit Diagnoses     Prenatal care in third trimester    -  Primary    Relevant Orders    POC Urinalysis Dipstick (Completed)          1. Pregnancy at 36w6d  2. Fetal status reassuring.   3. Reviewed Pre-eclampsia signs/symptoms   4. U/S reviewed.  5. Delivery options reviewed with patient  6. Signs of labor reviewed  7. Kick counts reviewed  8. Activity and Exercise discussed.      Eunice Hernandez MD  2023

## 2023-03-22 ENCOUNTER — ROUTINE PRENATAL (OUTPATIENT)
Dept: OBSTETRICS AND GYNECOLOGY | Facility: CLINIC | Age: 33
End: 2023-03-22
Payer: COMMERCIAL

## 2023-03-22 VITALS — BODY MASS INDEX: 39.44 KG/M2 | SYSTOLIC BLOOD PRESSURE: 112 MMHG | WEIGHT: 259.4 LBS | DIASTOLIC BLOOD PRESSURE: 78 MMHG

## 2023-03-22 DIAGNOSIS — Z34.93 PRENATAL CARE IN THIRD TRIMESTER: Primary | ICD-10-CM

## 2023-03-22 LAB
GLUCOSE UR STRIP-MCNC: NEGATIVE MG/DL
PROT UR STRIP-MCNC: NEGATIVE MG/DL

## 2023-03-22 PROCEDURE — 0502F SUBSEQUENT PRENATAL CARE: CPT | Performed by: OBSTETRICS & GYNECOLOGY

## 2023-03-22 NOTE — PROGRESS NOTES
OB FOLLOW UP  CC- Here for care of pregnancy        Char Villatoro is a 32 y.o.  38w0d patient being seen today for her obstetrical follow up visit. Patient reports no complaints..     Her prenatal care is complicated by (and status) :   Patient Active Problem List   Diagnosis   • Pregnancy       GBS Status:   Group B Strep Culture   Date Value Ref Range Status   2023 No Group B Streptococcus isolated  Final         No Known Allergies       Flu Status: Already given in current flu season  Her Delivery Plan is: [facility] IOL at 39wks. Scheduled 23 at 6:00 AM  US today: no  Non Stress Test: No.       ROS -   Patient Reports : see above  Patient Denies: Loss of Fluid, Vaginal Spotting, Vision Changes, Headaches, Nausea , Vomiting , Contractions and Epigastric pain  Fetal Movement : normal  All other systems reviewed and are negative.       The additional following portions of the patient's history were reviewed and updated as appropriate: allergies and current medications.    I have reviewed and agree with the HPI, ROS, and historical information as entered above. Eunice Hernandez MD      EXAM:     Prenatal Vitals  BP: 112/78  Weight: 118 kg (259 lb 6.4 oz)   Fetal Heart Rate: pos        cvx closed      Urine Glucose Read-only: Negative  Urine Protein Read-only: Negative       Assessment and Plan    Problem List Items Addressed This Visit    None  Visit Diagnoses     Prenatal care in third trimester    -  Primary    Relevant Orders    POC Urinalysis Dipstick (Completed)          1. Pregnancy at 38w0d  2. Fetal status reassuring.   3. Reviewed Pre-eclampsia signs/symptoms  4. Delivery options reviewed with patient and discussed upcoming induction.  5. Signs of labor reviewed  6. Kick counts reviewed  7. Activity and Exercise discussed.  Return in about 1 week (around 3/29/2023).    Eunice Hernandez MD  2023

## 2023-03-29 ENCOUNTER — ROUTINE PRENATAL (OUTPATIENT)
Dept: OBSTETRICS AND GYNECOLOGY | Facility: CLINIC | Age: 33
End: 2023-03-29
Payer: COMMERCIAL

## 2023-03-29 ENCOUNTER — PREP FOR SURGERY (OUTPATIENT)
Dept: OTHER | Facility: HOSPITAL | Age: 33
End: 2023-03-29
Payer: COMMERCIAL

## 2023-03-29 VITALS — WEIGHT: 257.4 LBS | BODY MASS INDEX: 39.14 KG/M2 | SYSTOLIC BLOOD PRESSURE: 110 MMHG | DIASTOLIC BLOOD PRESSURE: 70 MMHG

## 2023-03-29 DIAGNOSIS — Z34.93 PRENATAL CARE IN THIRD TRIMESTER: Primary | ICD-10-CM

## 2023-03-29 LAB
EXPIRATION DATE: NORMAL
GLUCOSE UR STRIP-MCNC: NEGATIVE MG/DL
Lab: NORMAL
PROT UR STRIP-MCNC: NEGATIVE MG/DL

## 2023-03-29 NOTE — PROGRESS NOTES
OB FOLLOW UP  CC- Here for care of pregnancy        Char Villatoro is a 32 y.o.  39w0d patient being seen today for her obstetrical follow up visit. Patient reports mild swelling in her hands/feet.    She denies LOF, vaginal spotting, headaches, vision changes or dami rebollar/contractions.  She reports adequate fetal movements, >10 movements in 10 hours.      Her prenatal care is complicated by (and status) : None  Patient Active Problem List   Diagnosis   • Pregnancy       GBS Status:   Group B Strep Culture   Date Value Ref Range Status   2023 No Group B Streptococcus isolated  Final         No Known Allergies     Her Delivery Plan is: IOL scheduled 3/30/23     today: no  Non Stress Test: No.       ROS -   Patient Reports : swelling  Patient Denies: Loss of Fluid, Vaginal Spotting, Vision Changes, Headaches, Nausea , Vomiting , Contractions and Epigastric pain  Fetal Movement : normal  All other systems reviewed and are negative.       The additional following portions of the patient's history were reviewed and updated as appropriate: allergies, current medications, past family history, past medical history, past social history, past surgical history and problem list.    I have reviewed and agree with the HPI, ROS, and historical information as entered above. Brina Diaz, APRN      EXAM:     Prenatal Vitals  BP: 110/70  Weight: 117 kg (257 lb 6.4 oz)   Fetal Heart Rate: 150       Dilation/Effacement/Station  Dilation: Fingertip      Urine Glucose Read-only: Negative  Urine Protein Read-only: Negative       Assessment and Plan    Problem List Items Addressed This Visit    None  Visit Diagnoses     Prenatal care in third trimester    -  Primary    Relevant Orders    POC Protein, Urine, Qualitative, Dipstick (Completed)    POC Glucose, Urine, Qualitative, Dipstick (Completed)          1. Pregnancy at 39w0d  2. Fetal status reassuring.   3. Reviewed upcoming IOL with patient.  Instructions given.  4. Delivery options reviewed with patient  5. Signs of labor reviewed  6. Kick counts reviewed  7. Activity and Exercise discussed.  8. IOL scheduled for tomorrow 3/30/23 at 1200 am    Brina Diaz, APRN  03/29/2023

## 2023-03-30 ENCOUNTER — ANESTHESIA (OUTPATIENT)
Dept: LABOR AND DELIVERY | Facility: HOSPITAL | Age: 33
End: 2023-03-30
Payer: COMMERCIAL

## 2023-03-30 ENCOUNTER — HOSPITAL ENCOUNTER (INPATIENT)
Facility: HOSPITAL | Age: 33
LOS: 4 days | Discharge: HOME | End: 2023-04-03
Attending: OBSTETRICS & GYNECOLOGY | Admitting: OBSTETRICS & GYNECOLOGY
Payer: COMMERCIAL

## 2023-03-30 ENCOUNTER — HOSPITAL ENCOUNTER (OUTPATIENT)
Dept: LABOR AND DELIVERY | Facility: HOSPITAL | Age: 33
Discharge: HOME | End: 2023-03-30
Payer: COMMERCIAL

## 2023-03-30 ENCOUNTER — ANESTHESIA EVENT (OUTPATIENT)
Dept: LABOR AND DELIVERY | Facility: HOSPITAL | Age: 33
End: 2023-03-30
Payer: COMMERCIAL

## 2023-03-30 DIAGNOSIS — Z98.891 STATUS POST CESAREAN SECTION: Primary | ICD-10-CM

## 2023-03-30 PROBLEM — Z34.90 PREGNANT: Status: ACTIVE | Noted: 2023-03-30

## 2023-03-30 LAB
ABO GROUP BLD: NORMAL
ABO GROUP BLD: NORMAL
ALP SERPL-CCNC: 177 U/L (ref 39–117)
ALT SERPL W P-5'-P-CCNC: 14 U/L (ref 1–33)
AST SERPL-CCNC: 23 U/L (ref 1–32)
BILIRUB SERPL-MCNC: 0.3 MG/DL (ref 0–1.2)
BLD GP AB SCN SERPL QL: NEGATIVE
CREAT SERPL-MCNC: 0.69 MG/DL (ref 0.57–1)
DEPRECATED RDW RBC AUTO: 44.2 FL (ref 37–54)
ERYTHROCYTE [DISTWIDTH] IN BLOOD BY AUTOMATED COUNT: 13.9 % (ref 12.3–15.4)
HCT VFR BLD AUTO: 38 % (ref 34–46.6)
HGB BLD-MCNC: 12.4 G/DL (ref 12–15.9)
LDH SERPL-CCNC: 232 U/L (ref 135–214)
MCH RBC QN AUTO: 28.9 PG (ref 26.6–33)
MCHC RBC AUTO-ENTMCNC: 32.6 G/DL (ref 31.5–35.7)
MCV RBC AUTO: 88.6 FL (ref 79–97)
PLATELET # BLD AUTO: 210 10*3/MM3 (ref 140–450)
PMV BLD AUTO: 11.3 FL (ref 6–12)
RBC # BLD AUTO: 4.29 10*6/MM3 (ref 3.77–5.28)
RH BLD: POSITIVE
RH BLD: POSITIVE
T&S EXPIRATION DATE: NORMAL
URATE SERPL-MCNC: 4.2 MG/DL (ref 2.4–5.7)
WBC NRBC COR # BLD: 10.86 10*3/MM3 (ref 3.4–10.8)

## 2023-03-30 PROCEDURE — 84550 ASSAY OF BLOOD/URIC ACID: CPT | Performed by: OBSTETRICS & GYNECOLOGY

## 2023-03-30 PROCEDURE — 86901 BLOOD TYPING SEROLOGIC RH(D): CPT

## 2023-03-30 PROCEDURE — 86901 BLOOD TYPING SEROLOGIC RH(D): CPT | Performed by: OBSTETRICS & GYNECOLOGY

## 2023-03-30 PROCEDURE — 85027 COMPLETE CBC AUTOMATED: CPT | Performed by: OBSTETRICS & GYNECOLOGY

## 2023-03-30 PROCEDURE — 82565 ASSAY OF CREATININE: CPT | Performed by: OBSTETRICS & GYNECOLOGY

## 2023-03-30 PROCEDURE — 83615 LACTATE (LD) (LDH) ENZYME: CPT | Performed by: OBSTETRICS & GYNECOLOGY

## 2023-03-30 PROCEDURE — 84075 ASSAY ALKALINE PHOSPHATASE: CPT | Performed by: OBSTETRICS & GYNECOLOGY

## 2023-03-30 PROCEDURE — 84450 TRANSFERASE (AST) (SGOT): CPT | Performed by: OBSTETRICS & GYNECOLOGY

## 2023-03-30 PROCEDURE — S0260 H&P FOR SURGERY: HCPCS | Performed by: OBSTETRICS & GYNECOLOGY

## 2023-03-30 PROCEDURE — 86850 RBC ANTIBODY SCREEN: CPT | Performed by: OBSTETRICS & GYNECOLOGY

## 2023-03-30 PROCEDURE — C1755 CATHETER, INTRASPINAL: HCPCS | Performed by: ANESTHESIOLOGY

## 2023-03-30 PROCEDURE — 25010000002 ROPIVACAINE PER 1 MG: Performed by: ANESTHESIOLOGY

## 2023-03-30 PROCEDURE — 25010000002 MORPHINE PER 10 MG: Performed by: OBSTETRICS & GYNECOLOGY

## 2023-03-30 PROCEDURE — 86900 BLOOD TYPING SEROLOGIC ABO: CPT | Performed by: OBSTETRICS & GYNECOLOGY

## 2023-03-30 PROCEDURE — 84460 ALANINE AMINO (ALT) (SGPT): CPT | Performed by: OBSTETRICS & GYNECOLOGY

## 2023-03-30 PROCEDURE — 25010000002 FENTANYL CITRATE (PF) 50 MCG/ML SOLUTION: Performed by: ANESTHESIOLOGY

## 2023-03-30 PROCEDURE — 82247 BILIRUBIN TOTAL: CPT | Performed by: OBSTETRICS & GYNECOLOGY

## 2023-03-30 PROCEDURE — 25010000002 ONDANSETRON PER 1 MG

## 2023-03-30 PROCEDURE — 59025 FETAL NON-STRESS TEST: CPT

## 2023-03-30 PROCEDURE — 86900 BLOOD TYPING SEROLOGIC ABO: CPT

## 2023-03-30 RX ORDER — EPHEDRINE SULFATE 5 MG/ML
INJECTION INTRAVENOUS
Status: DISCONTINUED
Start: 2023-03-30 | End: 2023-04-03 | Stop reason: HOSPADM

## 2023-03-30 RX ORDER — FAMOTIDINE 10 MG/ML
20 INJECTION, SOLUTION INTRAVENOUS 2 TIMES DAILY PRN
Status: DISCONTINUED | OUTPATIENT
Start: 2023-03-30 | End: 2023-03-31

## 2023-03-30 RX ORDER — BUPIVACAINE HYDROCHLORIDE 2.5 MG/ML
INJECTION, SOLUTION EPIDURAL; INFILTRATION; INTRACAUDAL AS NEEDED
Status: DISCONTINUED | OUTPATIENT
Start: 2023-03-30 | End: 2023-03-31 | Stop reason: SURG

## 2023-03-30 RX ORDER — SODIUM CHLORIDE 0.9 % (FLUSH) 0.9 %
10 SYRINGE (ML) INJECTION EVERY 12 HOURS SCHEDULED
Status: DISCONTINUED | OUTPATIENT
Start: 2023-03-30 | End: 2023-03-31 | Stop reason: HOSPADM

## 2023-03-30 RX ORDER — MAGNESIUM CARB/ALUMINUM HYDROX 105-160MG
30 TABLET,CHEWABLE ORAL ONCE
Status: DISCONTINUED | OUTPATIENT
Start: 2023-03-30 | End: 2023-03-31 | Stop reason: HOSPADM

## 2023-03-30 RX ORDER — ONDANSETRON 2 MG/ML
INJECTION INTRAMUSCULAR; INTRAVENOUS
Status: COMPLETED
Start: 2023-03-30 | End: 2023-03-30

## 2023-03-30 RX ORDER — ACETAMINOPHEN 325 MG/1
650 TABLET ORAL EVERY 4 HOURS PRN
Status: CANCELLED | OUTPATIENT
Start: 2023-03-30

## 2023-03-30 RX ORDER — METOCLOPRAMIDE HYDROCHLORIDE 5 MG/ML
10 INJECTION INTRAMUSCULAR; INTRAVENOUS ONCE AS NEEDED
Status: DISCONTINUED | OUTPATIENT
Start: 2023-03-30 | End: 2023-03-31 | Stop reason: HOSPADM

## 2023-03-30 RX ORDER — OXYTOCIN/0.9 % SODIUM CHLORIDE 30/500 ML
2-38 PLASTIC BAG, INJECTION (ML) INTRAVENOUS
Status: DISCONTINUED | OUTPATIENT
Start: 2023-03-30 | End: 2023-03-31 | Stop reason: HOSPADM

## 2023-03-30 RX ORDER — MISOPROSTOL 200 UG/1
800 TABLET ORAL AS NEEDED
Status: CANCELLED | OUTPATIENT
Start: 2023-03-30

## 2023-03-30 RX ORDER — CARBOPROST TROMETHAMINE 250 UG/ML
250 INJECTION, SOLUTION INTRAMUSCULAR AS NEEDED
Status: CANCELLED | OUTPATIENT
Start: 2023-03-30

## 2023-03-30 RX ORDER — FENTANYL CITRATE 50 UG/ML
INJECTION, SOLUTION INTRAMUSCULAR; INTRAVENOUS AS NEEDED
Status: DISCONTINUED | OUTPATIENT
Start: 2023-03-30 | End: 2023-03-31 | Stop reason: SURG

## 2023-03-30 RX ORDER — LIDOCAINE HYDROCHLORIDE 10 MG/ML
5 INJECTION, SOLUTION EPIDURAL; INFILTRATION; INTRACAUDAL; PERINEURAL AS NEEDED
Status: DISCONTINUED | OUTPATIENT
Start: 2023-03-30 | End: 2023-03-31 | Stop reason: HOSPADM

## 2023-03-30 RX ORDER — DIPHENHYDRAMINE HYDROCHLORIDE 50 MG/ML
12.5 INJECTION INTRAMUSCULAR; INTRAVENOUS EVERY 8 HOURS PRN
Status: DISCONTINUED | OUTPATIENT
Start: 2023-03-30 | End: 2023-03-31 | Stop reason: HOSPADM

## 2023-03-30 RX ORDER — TRISODIUM CITRATE DIHYDRATE AND CITRIC ACID MONOHYDRATE 500; 334 MG/5ML; MG/5ML
30 SOLUTION ORAL ONCE
Status: COMPLETED | OUTPATIENT
Start: 2023-03-30 | End: 2023-03-31

## 2023-03-30 RX ORDER — ROPIVACAINE HYDROCHLORIDE 2 MG/ML
15 INJECTION, SOLUTION EPIDURAL; INFILTRATION; PERINEURAL CONTINUOUS
Status: DISCONTINUED | OUTPATIENT
Start: 2023-03-30 | End: 2023-03-31

## 2023-03-30 RX ORDER — IBUPROFEN 600 MG/1
600 TABLET ORAL EVERY 6 HOURS PRN
Status: CANCELLED | OUTPATIENT
Start: 2023-03-30

## 2023-03-30 RX ORDER — ONDANSETRON 2 MG/ML
4 INJECTION INTRAMUSCULAR; INTRAVENOUS EVERY 6 HOURS PRN
Status: DISCONTINUED | OUTPATIENT
Start: 2023-03-30 | End: 2023-03-31

## 2023-03-30 RX ORDER — ONDANSETRON 2 MG/ML
4 INJECTION INTRAMUSCULAR; INTRAVENOUS EVERY 6 HOURS PRN
Status: CANCELLED | OUTPATIENT
Start: 2023-03-30

## 2023-03-30 RX ORDER — EPHEDRINE SULFATE 5 MG/ML
10 INJECTION INTRAVENOUS
Status: DISCONTINUED | OUTPATIENT
Start: 2023-03-30 | End: 2023-03-31 | Stop reason: HOSPADM

## 2023-03-30 RX ORDER — METHYLERGONOVINE MALEATE 0.2 MG/ML
200 INJECTION INTRAVENOUS ONCE AS NEEDED
Status: CANCELLED | OUTPATIENT
Start: 2023-03-30

## 2023-03-30 RX ORDER — SODIUM CHLORIDE, SODIUM LACTATE, POTASSIUM CHLORIDE, CALCIUM CHLORIDE 600; 310; 30; 20 MG/100ML; MG/100ML; MG/100ML; MG/100ML
125 INJECTION, SOLUTION INTRAVENOUS CONTINUOUS
Status: DISCONTINUED | OUTPATIENT
Start: 2023-03-30 | End: 2023-03-31

## 2023-03-30 RX ORDER — OXYTOCIN/0.9 % SODIUM CHLORIDE 30/500 ML
250 PLASTIC BAG, INJECTION (ML) INTRAVENOUS CONTINUOUS
Status: CANCELLED | OUTPATIENT
Start: 2023-03-30 | End: 2023-03-30

## 2023-03-30 RX ORDER — LIDOCAINE HYDROCHLORIDE AND EPINEPHRINE 15; 5 MG/ML; UG/ML
INJECTION, SOLUTION EPIDURAL AS NEEDED
Status: DISCONTINUED | OUTPATIENT
Start: 2023-03-30 | End: 2023-03-31 | Stop reason: SURG

## 2023-03-30 RX ORDER — ONDANSETRON 4 MG/1
4 TABLET, FILM COATED ORAL EVERY 6 HOURS PRN
Status: CANCELLED | OUTPATIENT
Start: 2023-03-30

## 2023-03-30 RX ORDER — HYDROCODONE BITARTRATE AND ACETAMINOPHEN 10; 325 MG/1; MG/1
1 TABLET ORAL EVERY 4 HOURS PRN
Status: CANCELLED | OUTPATIENT
Start: 2023-03-30 | End: 2023-04-09

## 2023-03-30 RX ORDER — OXYTOCIN/0.9 % SODIUM CHLORIDE 30/500 ML
999 PLASTIC BAG, INJECTION (ML) INTRAVENOUS ONCE
Status: CANCELLED | OUTPATIENT
Start: 2023-03-30 | End: 2023-03-30

## 2023-03-30 RX ORDER — SODIUM CHLORIDE 0.9 % (FLUSH) 0.9 %
1-10 SYRINGE (ML) INJECTION AS NEEDED
Status: DISCONTINUED | OUTPATIENT
Start: 2023-03-30 | End: 2023-03-31 | Stop reason: HOSPADM

## 2023-03-30 RX ADMIN — Medication 2 MILLI-UNITS/MIN: at 16:21

## 2023-03-30 RX ADMIN — SODIUM CHLORIDE, POTASSIUM CHLORIDE, SODIUM LACTATE AND CALCIUM CHLORIDE 125 ML/HR: 600; 310; 30; 20 INJECTION, SOLUTION INTRAVENOUS at 16:19

## 2023-03-30 RX ADMIN — ONDANSETRON 4 MG: 2 INJECTION INTRAMUSCULAR; INTRAVENOUS at 18:13

## 2023-03-30 RX ADMIN — LIDOCAINE HYDROCHLORIDE AND EPINEPHRINE 2 ML: 15; 5 INJECTION, SOLUTION EPIDURAL at 23:02

## 2023-03-30 RX ADMIN — FENTANYL CITRATE 100 MCG: 50 INJECTION, SOLUTION INTRAMUSCULAR; INTRAVENOUS at 23:03

## 2023-03-30 RX ADMIN — ROPIVACAINE HYDROCHLORIDE 15 ML/HR: 2 INJECTION, SOLUTION EPIDURAL; INFILTRATION at 23:10

## 2023-03-30 RX ADMIN — FAMOTIDINE 20 MG: 10 INJECTION INTRAVENOUS at 19:40

## 2023-03-30 RX ADMIN — BUPIVACAINE HYDROCHLORIDE 12 ML: 2.5 INJECTION, SOLUTION EPIDURAL; INFILTRATION; INTRACAUDAL; PERINEURAL at 23:04

## 2023-03-30 RX ADMIN — LIDOCAINE HYDROCHLORIDE AND EPINEPHRINE 3 ML: 15; 5 INJECTION, SOLUTION EPIDURAL at 23:01

## 2023-03-30 RX ADMIN — EPHEDRINE SULFATE 10 MG: 5 INJECTION INTRAVENOUS at 23:25

## 2023-03-30 RX ADMIN — MORPHINE SULFATE 4 MG: 4 INJECTION, SOLUTION INTRAMUSCULAR; INTRAVENOUS at 19:53

## 2023-03-30 RX ADMIN — Medication 10 ML: at 16:19

## 2023-03-30 NOTE — H&P
"History and Physical  [facility]    Chief Complaint   Patient presents with   • Scheduled Induction       Patient Active Problem List   Diagnosis   • Pregnancy   • Pregnant       Char Villatoro is a 32 y.o. year old  with an Estimated Date of Delivery: 23 currently at 39w1d presenting with no complaints.    Prenatal care has been with Dr. Hernandez.  It has been significant for .    No Additional Complaints Reported    The following portions of the patient's history were reviewed and updated as appropriate:vital signs, allergies, current medications, past medical history, past social history, past surgical history and problem list.    Review of Systems  A comprehensive review of systems was negative.     Objective     Temp 98.1 °F (36.7 °C) (Oral)   Resp 16   Ht 172.7 cm (68\")   Wt 117 kg (257 lb)   LMP 2022   Breastfeeding Yes   BMI 39.08 kg/m²     Physical Exam    General:  well developed; well nourished  no acute distress           Abdomen: soft, non-tender; no masses  no umbilical or inguinal hernias are present  no hepato-splenomegaly       FHT's: reactive and category 1   Cervix: closed   [place]: Contraction are irregular     Lab Review   Labs: No data reviewed   Lab Results (last 24 hours)     ** No results found for the last 24 hours. **          Imaging   No data reviewed   Imaging Results (Most Recent)     None        Assessment & Plan     ASSESSMENT  1. IUP at 39w1d  2. GBS neg  3. Desires risk reducing induction  PLAN  1. Admit for cervical ripening and pitocin induction of labor.         Eunice Hernandez MD  3/30/928291:31 EDT  "

## 2023-03-31 PROCEDURE — 25010000002 ONDANSETRON PER 1 MG: Performed by: NURSE ANESTHETIST, CERTIFIED REGISTERED

## 2023-03-31 PROCEDURE — 51702 INSERT TEMP BLADDER CATH: CPT

## 2023-03-31 PROCEDURE — C1755 CATHETER, INTRASPINAL: HCPCS

## 2023-03-31 PROCEDURE — 25010000002 KETOROLAC TROMETHAMINE PER 15 MG: Performed by: OBSTETRICS & GYNECOLOGY

## 2023-03-31 PROCEDURE — 25010000002 METHYLERGONOVINE MALEATE PER 0.2 MG: Performed by: NURSE ANESTHETIST, CERTIFIED REGISTERED

## 2023-03-31 PROCEDURE — 25010000002 FENTANYL CITRATE (PF) 50 MCG/ML SOLUTION: Performed by: ANESTHESIOLOGY

## 2023-03-31 PROCEDURE — 25010000002 MIDAZOLAM PER 1 MG: Performed by: NURSE ANESTHETIST, CERTIFIED REGISTERED

## 2023-03-31 PROCEDURE — 0 MORPHINE PER 10 MG: Performed by: NURSE ANESTHETIST, CERTIFIED REGISTERED

## 2023-03-31 PROCEDURE — 25010000002 AZITHROMYCIN PER 500 MG: Performed by: OBSTETRICS & GYNECOLOGY

## 2023-03-31 PROCEDURE — 25010000002 OXYTOCIN PER 10 UNITS: Performed by: NURSE ANESTHETIST, CERTIFIED REGISTERED

## 2023-03-31 PROCEDURE — 59400 OBSTETRICAL CARE: CPT | Performed by: OBSTETRICS & GYNECOLOGY

## 2023-03-31 RX ORDER — PRENATAL VIT/IRON FUM/FOLIC AC 27MG-0.8MG
1 TABLET ORAL DAILY
Status: DISCONTINUED | OUTPATIENT
Start: 2023-03-31 | End: 2023-04-03 | Stop reason: HOSPADM

## 2023-03-31 RX ORDER — OXYTOCIN 10 [USP'U]/ML
INJECTION, SOLUTION INTRAMUSCULAR; INTRAVENOUS AS NEEDED
Status: DISCONTINUED | OUTPATIENT
Start: 2023-03-31 | End: 2023-03-31 | Stop reason: SURG

## 2023-03-31 RX ORDER — DOCUSATE SODIUM 100 MG/1
100 CAPSULE, LIQUID FILLED ORAL 2 TIMES DAILY PRN
Status: DISCONTINUED | OUTPATIENT
Start: 2023-03-31 | End: 2023-04-01

## 2023-03-31 RX ORDER — METOCLOPRAMIDE 10 MG/1
10 TABLET ORAL AS NEEDED
Status: DISCONTINUED | OUTPATIENT
Start: 2023-03-31 | End: 2023-04-03 | Stop reason: HOSPADM

## 2023-03-31 RX ORDER — OXYTOCIN/0.9 % SODIUM CHLORIDE 30/500 ML
PLASTIC BAG, INJECTION (ML) INTRAVENOUS AS NEEDED
Status: DISCONTINUED | OUTPATIENT
Start: 2023-03-31 | End: 2023-03-31 | Stop reason: SURG

## 2023-03-31 RX ORDER — SIMETHICONE 80 MG
80 TABLET,CHEWABLE ORAL 4 TIMES DAILY PRN
Status: DISCONTINUED | OUTPATIENT
Start: 2023-03-31 | End: 2023-04-03 | Stop reason: HOSPADM

## 2023-03-31 RX ORDER — ALUMINA, MAGNESIA, AND SIMETHICONE 2400; 2400; 240 MG/30ML; MG/30ML; MG/30ML
15 SUSPENSION ORAL EVERY 4 HOURS PRN
Status: DISCONTINUED | OUTPATIENT
Start: 2023-03-31 | End: 2023-04-03 | Stop reason: HOSPADM

## 2023-03-31 RX ORDER — ACETAMINOPHEN 325 MG/1
650 TABLET ORAL EVERY 6 HOURS
Status: DISCONTINUED | OUTPATIENT
Start: 2023-04-01 | End: 2023-04-03 | Stop reason: HOSPADM

## 2023-03-31 RX ORDER — CALCIUM CARBONATE 200(500)MG
1 TABLET,CHEWABLE ORAL EVERY 4 HOURS PRN
Status: DISCONTINUED | OUTPATIENT
Start: 2023-03-31 | End: 2023-04-03 | Stop reason: HOSPADM

## 2023-03-31 RX ORDER — KETOROLAC TROMETHAMINE 15 MG/ML
15 INJECTION, SOLUTION INTRAMUSCULAR; INTRAVENOUS EVERY 6 HOURS
Status: COMPLETED | OUTPATIENT
Start: 2023-03-31 | End: 2023-04-01

## 2023-03-31 RX ORDER — ACETAMINOPHEN 500 MG
1000 TABLET ORAL EVERY 6 HOURS
Status: DISPENSED | OUTPATIENT
Start: 2023-03-31 | End: 2023-04-01

## 2023-03-31 RX ORDER — OXYCODONE HYDROCHLORIDE 5 MG/1
10 TABLET ORAL EVERY 6 HOURS PRN
Status: DISCONTINUED | OUTPATIENT
Start: 2023-03-31 | End: 2023-04-03 | Stop reason: HOSPADM

## 2023-03-31 RX ORDER — METHYLERGONOVINE MALEATE 0.2 MG/ML
INJECTION INTRAVENOUS AS NEEDED
Status: DISCONTINUED | OUTPATIENT
Start: 2023-03-31 | End: 2023-03-31 | Stop reason: SURG

## 2023-03-31 RX ORDER — KETOROLAC TROMETHAMINE 30 MG/ML
30 INJECTION, SOLUTION INTRAMUSCULAR; INTRAVENOUS ONCE
Status: COMPLETED | OUTPATIENT
Start: 2023-03-31 | End: 2023-03-31

## 2023-03-31 RX ORDER — HYDROCORTISONE 25 MG/G
1 CREAM TOPICAL AS NEEDED
Status: DISCONTINUED | OUTPATIENT
Start: 2023-03-31 | End: 2023-04-03 | Stop reason: HOSPADM

## 2023-03-31 RX ORDER — FAMOTIDINE 10 MG/ML
INJECTION, SOLUTION INTRAVENOUS AS NEEDED
Status: DISCONTINUED | OUTPATIENT
Start: 2023-03-31 | End: 2023-03-31 | Stop reason: SURG

## 2023-03-31 RX ORDER — ONDANSETRON 2 MG/ML
INJECTION INTRAMUSCULAR; INTRAVENOUS AS NEEDED
Status: DISCONTINUED | OUTPATIENT
Start: 2023-03-31 | End: 2023-03-31 | Stop reason: SURG

## 2023-03-31 RX ORDER — CEFAZOLIN SODIUM 2 G/100ML
INJECTION, SOLUTION INTRAVENOUS
Status: COMPLETED
Start: 2023-03-31 | End: 2023-03-31

## 2023-03-31 RX ORDER — IBUPROFEN 600 MG/1
600 TABLET ORAL EVERY 6 HOURS
Status: DISCONTINUED | OUTPATIENT
Start: 2023-04-01 | End: 2023-04-03 | Stop reason: HOSPADM

## 2023-03-31 RX ORDER — MIDAZOLAM HYDROCHLORIDE 1 MG/ML
INJECTION INTRAMUSCULAR; INTRAVENOUS AS NEEDED
Status: DISCONTINUED | OUTPATIENT
Start: 2023-03-31 | End: 2023-03-31 | Stop reason: SURG

## 2023-03-31 RX ORDER — MORPHINE SULFATE 0.5 MG/ML
INJECTION, SOLUTION EPIDURAL; INTRATHECAL; INTRAVENOUS AS NEEDED
Status: DISCONTINUED | OUTPATIENT
Start: 2023-03-31 | End: 2023-03-31 | Stop reason: SURG

## 2023-03-31 RX ORDER — OXYCODONE HYDROCHLORIDE 5 MG/1
5 TABLET ORAL EVERY 6 HOURS PRN
Status: DISCONTINUED | OUTPATIENT
Start: 2023-03-31 | End: 2023-04-03 | Stop reason: HOSPADM

## 2023-03-31 RX ORDER — LIDOCAINE HYDROCHLORIDE AND EPINEPHRINE 20; 5 MG/ML; UG/ML
INJECTION, SOLUTION EPIDURAL; INFILTRATION; INTRACAUDAL; PERINEURAL AS NEEDED
Status: DISCONTINUED | OUTPATIENT
Start: 2023-03-31 | End: 2023-03-31 | Stop reason: SURG

## 2023-03-31 RX ORDER — FENTANYL CITRATE 50 UG/ML
INJECTION, SOLUTION INTRAMUSCULAR; INTRAVENOUS AS NEEDED
Status: DISCONTINUED | OUTPATIENT
Start: 2023-03-31 | End: 2023-03-31 | Stop reason: SURG

## 2023-03-31 RX ADMIN — MIDAZOLAM 1 MG: 1 INJECTION INTRAMUSCULAR; INTRAVENOUS at 11:55

## 2023-03-31 RX ADMIN — LIDOCAINE HYDROCHLORIDE AND EPINEPHRINE 10 ML: 20; 5 INJECTION, SOLUTION EPIDURAL; INFILTRATION; INTRACAUDAL; PERINEURAL at 11:15

## 2023-03-31 RX ADMIN — LIDOCAINE HYDROCHLORIDE AND EPINEPHRINE 3 ML: 20; 5 INJECTION, SOLUTION EPIDURAL; INFILTRATION; INTRACAUDAL; PERINEURAL at 11:35

## 2023-03-31 RX ADMIN — SODIUM CITRATE AND CITRIC ACID MONOHYDRATE 30 ML: 500; 334 SOLUTION ORAL at 11:20

## 2023-03-31 RX ADMIN — Medication 2 G: at 11:06

## 2023-03-31 RX ADMIN — KETOROLAC TROMETHAMINE 30 MG: 30 INJECTION, SOLUTION INTRAMUSCULAR; INTRAVENOUS at 13:34

## 2023-03-31 RX ADMIN — OXYTOCIN 5 UNITS: 10 INJECTION, SOLUTION INTRAMUSCULAR; INTRAVENOUS at 11:55

## 2023-03-31 RX ADMIN — METHYLERGONOVINE MALEATE 200 MCG: 0.2 INJECTION INTRAVENOUS at 12:00

## 2023-03-31 RX ADMIN — FAMOTIDINE 20 MG: 10 INJECTION INTRAVENOUS at 11:35

## 2023-03-31 RX ADMIN — METOCLOPRAMIDE 10 MG: 10 TABLET ORAL at 15:24

## 2023-03-31 RX ADMIN — Medication 500 ML: at 12:17

## 2023-03-31 RX ADMIN — Medication 500 ML: at 11:50

## 2023-03-31 RX ADMIN — KETOROLAC TROMETHAMINE 15 MG: 15 INJECTION, SOLUTION INTRAMUSCULAR; INTRAVENOUS at 18:19

## 2023-03-31 RX ADMIN — ACETAMINOPHEN 1000 MG: 500 TABLET ORAL at 22:20

## 2023-03-31 RX ADMIN — ONDANSETRON 4 MG: 2 INJECTION INTRAMUSCULAR; INTRAVENOUS at 11:35

## 2023-03-31 RX ADMIN — FENTANYL CITRATE 50 MCG: 50 INJECTION, SOLUTION INTRAMUSCULAR; INTRAVENOUS at 11:53

## 2023-03-31 RX ADMIN — MIDAZOLAM 1 MG: 1 INJECTION INTRAMUSCULAR; INTRAVENOUS at 11:35

## 2023-03-31 RX ADMIN — MORPHINE SULFATE 3 MG: 0.5 INJECTION, SOLUTION EPIDURAL; INTRATHECAL; INTRAVENOUS at 11:53

## 2023-03-31 RX ADMIN — SODIUM CHLORIDE, POTASSIUM CHLORIDE, SODIUM LACTATE AND CALCIUM CHLORIDE: 600; 310; 30; 20 INJECTION, SOLUTION INTRAVENOUS at 11:50

## 2023-03-31 RX ADMIN — Medication 1 APPLICATION: at 18:19

## 2023-03-31 NOTE — ANESTHESIA POSTPROCEDURE EVALUATION
Patient: Char Villatoro    Procedure Summary     Date: 23 Room / Location: [facility] LABOR DELIVERY 2 /  [facility]    Anesthesia Start:  Anesthesia Stop: 23 123    Procedure:  SECTION PRIMARY (Abdomen) Diagnosis:     Surgeons: Yuridia Lu MD Provider: Michael Alexander DO    Anesthesia Type: epidural ASA Status: 2          Anesthesia Type: epidural    Vitals  Vitals Value Taken Time   /61 23 1230   Temp 98.2 °F (36.8 °C) 23 1230   Pulse 121 23 1230   Resp 16 23 1230   SpO2 98 % 23 2304   Vitals shown include unvalidated device data.        Post Anesthesia Care and Evaluation    Patient location during evaluation: bedside  Patient participation: complete - patient participated  Level of consciousness: awake and alert  Pain score: 0  Pain management: adequate    Airway patency: patent  Anesthetic complications: No anesthetic complications    Cardiovascular status: acceptable  Respiratory status: acceptable  Hydration status: acceptable

## 2023-03-31 NOTE — LACTATION NOTE
03/31/23 1745   Maternal Information   Date of Referral 03/31/23   Person Making Referral lactation consultant  (courtesy visit, newly postpartum)   Maternal Reason for Referral no prior breastfeeding experience   Maternal Assessment   Breast Size Issue none   Breast Shape Bilateral:;round   Breast Density Bilateral:;soft   Nipples Bilateral:;flat   Left Nipple Symptoms intact;nontender   Right Nipple Symptoms intact;nontender   Maternal Infant Feeding   Maternal Emotional State independent;receptive;relaxed   Infant Positioning clutch/football;cross-cradle   Signs of Milk Transfer deep jaw excursions noted   Pain with Feeding no  (per pt report)   Comfort Measures Before/During Feeding infant position adjusted;latch adjusted;maternal position adjusted;suction broken using finger;other (see comments)  (nipple shield with proper placement education/demonstrated completed)   Latch Assistance minimal assistance   Support Person Involvement actively supporting mother   Milk Expression/Equipment   Breast Pump Type double electric, personal   Breast Pumping   Breast Pumping Interventions post-feed pumping encouraged  (for short/missed feedings, if supplementation is required, or if breastfeeding becomes too painful to encourage breastmilk production)     Completed breastfeeding education encouraging pt to achieve a deep, comfortable latch throughout breastfeeding, which should be at least every 3 hours while giving baby stimulation for high quality transfer of breastmilk.PRN Lactation Consultant/Clinic contact encouraged.

## 2023-03-31 NOTE — PROGRESS NOTES
Exam at 8 AM.  Cervix 60/-1.  Artificial rupture of membranes was done 8 hours ago.  IUPC placed now. con Tractions are 2-4.  Minutes.  Patient appears to have a large for gestational age infant.  We will continue Pitocin and hope for dilation.  Risks of induction and possibility of  explained to the patient.  NST category 1.

## 2023-03-31 NOTE — ANESTHESIA PREPROCEDURE EVALUATION
Anesthesia Evaluation     Patient summary reviewed and Nursing notes reviewed                Airway   Mallampati: II  TM distance: >3 FB  Neck ROM: full  No difficulty expected  Dental      Pulmonary - negative pulmonary ROS   Cardiovascular - negative cardio ROS        Neuro/Psych- negative ROS  GI/Hepatic/Renal/Endo    (+) obesity,       Musculoskeletal (-) negative ROS    Abdominal    Substance History - negative use     OB/GYN    (+) Pregnant,         Other                        Anesthesia Plan    ASA 2     epidural       Anesthetic plan, risks, benefits, and alternatives have been provided, discussed and informed consent has been obtained with: patient.    Use of blood products discussed with patient .       CODE STATUS:    Level Of Support Discussed With: Patient  Code Status (Patient has no pulse and is not breathing): CPR (Attempt to Resuscitate)  Medical Interventions (Patient has pulse or is breathing): Full Support  Release to patient: Routine Release

## 2023-03-31 NOTE — PROGRESS NOTES
Cx remains 4 to 5 and no real change and we have recommended C /S and all risks amnd options were explained and they agree

## 2023-03-31 NOTE — OP NOTE
Operative Note    Patient name: Char Villatoro  YOB: 1990   MRN: [id number]  Admission Date: 3/30/2023  Referring Provider: Eunice Hernandez MD    ID: 32 y.o.  at 39w2d    Pre-Operative Dx:   1. Intrauterine pregnancy at 39w2d weeks   2. Failure to progress: arrest of dilation      Postoperative dx:    1. Intrauterine pregnancy at 39w2d weeks 2.   Failure to progress: arrest of dilation            Procedure(s): primarylow transverse  delivery     Surgeons: Surgeon(s) and Role:     * Yuridia Lu MD - Primary    Staff:  Surgeon(s):  Yuridia Lu MD           Assistant: Olivia Marley    Anesthesia: Epidural    Estimated Blood Loss: 1045 mL    IV Fluids: 1800 mls    Preoperative antibiotic: Ancef (cefazolin) 2 grams and Azithromycin    Blood products:   Blood Administration Record (From admission, onward)    None          Pathology: * No orders in the log *    Drains: Rowe catheter to gravity    Complications: None    Condition: Stable to recovery room    Infant:                Gender: female  infant    Weight: 4211 g (9 lb 4.5 oz)     Apgars: 8  @ 1 minute /     9  @ 5 minutes    Cord gases: Venous:  No components found for:  PHCVEN,  BECVEN      Arterial:  No components found for:  PHCART,  BECART          Operative Summary: Patient was counseled about the risks of  including the possibility of bleeding infection damage to bowel bladder and ureter as well as postop issues like blood clots hematomas pneumonias.  They wanted to go ahead and proceed we will give her preop antibiotics.   After obtaining informed consent the patient was taken to the operating room where adequate anesthesia was obtained.  Rowe catheter was placed in the bladder preoperatively.  IV antibiotics were given preoperatively.       The abdomen was prepped and draped in the usual sterile fashion for  delivery.  After confirming adequate anesthesia a Pfannenstiel skin incision was made  with the scalpel and carried through to the underlying layer of fascia.  The fascia was incised in the midline and the incision extended laterally with the Lehman scissors and with blunt dissection.       The upper aspect of the fascia was grasped with 2 Kocher clamps, elevated, and dissected off the underlying rectus muscles bluntly and with the Lehman scissors.  The Kocher clamps were removed and applied to the inferior aspect of the fascia.  The fascia was dissected off of the rectus muscles in the same fashion.  The peritoneum was entered bluntly.  The incision was stretched and the bladder blade and Cavanaugh retractor inserted for visualization of the uterus. A bladder flap was made and bladder blade replaced.        The uterus was incised with the scalpel in a low transverse fashion.  The uterine incision was entered digitally and the incision extended bluntly in a craniocaudal fashion.  Retractors were removed and membranes were ruptured.  The infant was delivered atraumatically from vertex presentation.  The umbilical cord was milked 4 times, clamped and cut and the nose and mouth bulb suctioned.  The infant was handed off to waiting pediatric staff.       Cord blood gases were not collected.  Cord blood was collected.  The placenta was removed using cord traction and uterine massage.  The uterus was exteriorized and cleared of all clots and debris.  The uterine incision was repaired with #1 chromic in a running locked fashion. A double layer technique was used.  Additional hemostatic measures required: electrocautery.    The incision was inspected and excellent hemostasis was noted.  The tubes and ovaries were noted to be normal.   The uterus was returned to the abdomen.  The gutters were cleared of all clots and debris.  Irrigation was used.  The uterine incision was again inspected and found to be hemostatic.       The peritoneum was reapproximated with 2-0 Vicryl in a running fashion.  The fascia was  closed with 0 Vicryl in a running fashion.  The subcutaneous space was reapproximated using 3-0 plain gut in interrupted stiches.      The skin was closed using staples, and dressing placed. All sharp, instrument, and sponge counts were correct. The patient was transferred to the recovery room in stable condition.    Surgical Assist: OR crew assisted lindsey Lu MD  3/31/2023

## 2023-04-01 LAB
BASOPHILS # BLD AUTO: 0.02 10*3/MM3 (ref 0–0.2)
BASOPHILS NFR BLD AUTO: 0.2 % (ref 0–1.5)
DEPRECATED RDW RBC AUTO: 45.2 FL (ref 37–54)
EOSINOPHIL # BLD AUTO: 0.06 10*3/MM3 (ref 0–0.4)
EOSINOPHIL NFR BLD AUTO: 0.5 % (ref 0.3–6.2)
ERYTHROCYTE [DISTWIDTH] IN BLOOD BY AUTOMATED COUNT: 14.1 % (ref 12.3–15.4)
HCT VFR BLD AUTO: 27.1 % (ref 34–46.6)
HGB BLD-MCNC: 8.9 G/DL (ref 12–15.9)
IMM GRANULOCYTES # BLD AUTO: 0.08 10*3/MM3 (ref 0–0.05)
IMM GRANULOCYTES NFR BLD AUTO: 0.6 % (ref 0–0.5)
LYMPHOCYTES # BLD AUTO: 2.4 10*3/MM3 (ref 0.7–3.1)
LYMPHOCYTES NFR BLD AUTO: 19.4 % (ref 19.6–45.3)
MCH RBC QN AUTO: 29.3 PG (ref 26.6–33)
MCHC RBC AUTO-ENTMCNC: 32.8 G/DL (ref 31.5–35.7)
MCV RBC AUTO: 89.1 FL (ref 79–97)
MONOCYTES # BLD AUTO: 1.12 10*3/MM3 (ref 0.1–0.9)
MONOCYTES NFR BLD AUTO: 9 % (ref 5–12)
NEUTROPHILS NFR BLD AUTO: 70.3 % (ref 42.7–76)
NEUTROPHILS NFR BLD AUTO: 8.7 10*3/MM3 (ref 1.7–7)
NRBC BLD AUTO-RTO: 0 /100 WBC (ref 0–0.2)
PLATELET # BLD AUTO: 156 10*3/MM3 (ref 140–450)
PMV BLD AUTO: 11.2 FL (ref 6–12)
RBC # BLD AUTO: 3.04 10*6/MM3 (ref 3.77–5.28)
WBC NRBC COR # BLD: 12.38 10*3/MM3 (ref 3.4–10.8)

## 2023-04-01 PROCEDURE — 0503F POSTPARTUM CARE VISIT: CPT

## 2023-04-01 PROCEDURE — 85025 COMPLETE CBC W/AUTO DIFF WBC: CPT | Performed by: OBSTETRICS & GYNECOLOGY

## 2023-04-01 PROCEDURE — 25010000002 KETOROLAC TROMETHAMINE PER 15 MG: Performed by: OBSTETRICS & GYNECOLOGY

## 2023-04-01 RX ORDER — FERROUS SULFATE 325(65) MG
325 TABLET ORAL
Status: DISCONTINUED | OUTPATIENT
Start: 2023-04-01 | End: 2023-04-03 | Stop reason: HOSPADM

## 2023-04-01 RX ORDER — DOCUSATE SODIUM 100 MG/1
100 CAPSULE, LIQUID FILLED ORAL 2 TIMES DAILY
Status: DISCONTINUED | OUTPATIENT
Start: 2023-04-01 | End: 2023-04-03 | Stop reason: HOSPADM

## 2023-04-01 RX ADMIN — FERROUS SULFATE TAB 325 MG (65 MG ELEMENTAL FE) 325 MG: 325 (65 FE) TAB at 15:49

## 2023-04-01 RX ADMIN — ACETAMINOPHEN 1000 MG: 500 TABLET ORAL at 04:36

## 2023-04-01 RX ADMIN — ACETAMINOPHEN 1000 MG: 500 TABLET ORAL at 09:12

## 2023-04-01 RX ADMIN — SIMETHICONE 80 MG: 80 TABLET, CHEWABLE ORAL at 18:08

## 2023-04-01 RX ADMIN — ACETAMINOPHEN 325MG 650 MG: 325 TABLET ORAL at 21:25

## 2023-04-01 RX ADMIN — IBUPROFEN 600 MG: 600 TABLET, FILM COATED ORAL at 18:08

## 2023-04-01 RX ADMIN — PRENATAL VITAMINS-IRON FUMARATE 27 MG IRON-FOLIC ACID 0.8 MG TABLET 1 TABLET: at 09:12

## 2023-04-01 RX ADMIN — KETOROLAC TROMETHAMINE 15 MG: 15 INJECTION, SOLUTION INTRAMUSCULAR; INTRAVENOUS at 01:33

## 2023-04-01 RX ADMIN — SIMETHICONE 80 MG: 80 TABLET, CHEWABLE ORAL at 09:12

## 2023-04-01 RX ADMIN — KETOROLAC TROMETHAMINE 15 MG: 15 INJECTION, SOLUTION INTRAMUSCULAR; INTRAVENOUS at 06:55

## 2023-04-01 RX ADMIN — KETOROLAC TROMETHAMINE 15 MG: 15 INJECTION, SOLUTION INTRAMUSCULAR; INTRAVENOUS at 12:02

## 2023-04-01 RX ADMIN — DOCUSATE SODIUM 100 MG: 100 CAPSULE, LIQUID FILLED ORAL at 09:12

## 2023-04-01 RX ADMIN — ACETAMINOPHEN 325MG 650 MG: 325 TABLET ORAL at 15:49

## 2023-04-01 NOTE — PROGRESS NOTES
2023    Name:Char Villatoro    MR#:[id number]     PROGRESS NOTE:  Post-Op Day 1 S/P    HD:2    Subjective   32 y.o. yo Female  s/p CS at 39w2d doing well. Pain well controlled. Tolerating regular diet and having flatus. Lochia normal.     Patient Active Problem List   Diagnosis   • Pregnancy   • Pregnant        Objective    Vitals  Temp:  Temp:  [97.3 °F (36.3 °C)-98.5 °F (36.9 °C)] 97.5 °F (36.4 °C)  Temp src: Oral  BP:  BP: ()/(50-65) 103/51  Pulse:  Heart Rate:  [] 81  RR:   Resp:  [16] 16    General Awake, alert, no distress  Abdomen Soft, non-distended, fundus firm, below umbilicus, appropriately tender  Incision  Deferred. Clean dressing in place.   Extremities Calves NT bilaterally     I/O last 3 completed shifts:  In: 700 [I.V.:700]  Out: 4020 [Urine:2975; Blood:1045]    LABS:   Lab Results   Component Value Date    WBC 12.38 (H) 2023    HGB 8.9 (L) 2023    HCT 27.1 (L) 2023    MCV 89.1 2023     2023       Infant: female . Doing well.      Assessment   1.  POD 1, PCS- FTP. Doing well.    2.  Anemic. Asymptomatic. VSS.    Plan:   1. Doing well.  Routine postoperative care. Advance.   2. PO iron w/ constipation precautions.       Active Problems:   None      Debbie Mcgregor, STEFF  2023 13:21 EDT

## 2023-04-01 NOTE — ANESTHESIA POSTPROCEDURE EVALUATION
Patient: Char Villatoro    Procedure Summary     Date: 23 Room / Location: [facility] LABOR DELIVERY 2 /  [facility] LABOR DELIVERY    Anesthesia Start:  Anesthesia Stop: 23 123    Procedure:  SECTION PRIMARY (Abdomen) Diagnosis:     Surgeons: Yuridia Lu MD Provider: Michael Alexander DO    Anesthesia Type: epidural ASA Status: 2          Anesthesia Type: epidural    Vitals  Vitals Value Taken Time   /58 23 0745   Temp 97.5 °F (36.4 °C) 23 0745   Pulse 81 23 0745   Resp 16 23 0745   SpO2 96 % 23 1333   Vitals shown include unvalidated device data.        Post Anesthesia Care and Evaluation    Patient location during evaluation: bedside  Patient participation: complete - patient participated  Level of consciousness: awake and awake and alert  Pain score: 0  Pain management: satisfactory to patient    Airway patency: patent  Anesthetic complications: No anesthetic complications  PONV Status: none  Cardiovascular status: acceptable, hemodynamically stable and stable  Respiratory status: acceptable  Hydration status: stable  Post Neuraxial Block status: Motor and sensory function returned to baseline and No signs or symptoms of PDPH

## 2023-04-02 PROCEDURE — 0503F POSTPARTUM CARE VISIT: CPT

## 2023-04-02 RX ADMIN — IBUPROFEN 600 MG: 600 TABLET, FILM COATED ORAL at 18:01

## 2023-04-02 RX ADMIN — SIMETHICONE 80 MG: 80 TABLET, CHEWABLE ORAL at 12:20

## 2023-04-02 RX ADMIN — ACETAMINOPHEN 325MG 650 MG: 325 TABLET ORAL at 15:42

## 2023-04-02 RX ADMIN — ACETAMINOPHEN 325MG 650 MG: 325 TABLET ORAL at 09:31

## 2023-04-02 RX ADMIN — DOCUSATE SODIUM 100 MG: 100 CAPSULE, LIQUID FILLED ORAL at 00:53

## 2023-04-02 RX ADMIN — DOCUSATE SODIUM 100 MG: 100 CAPSULE, LIQUID FILLED ORAL at 07:53

## 2023-04-02 RX ADMIN — ACETAMINOPHEN 325MG 650 MG: 325 TABLET ORAL at 03:04

## 2023-04-02 RX ADMIN — ACETAMINOPHEN 325MG 650 MG: 325 TABLET ORAL at 20:54

## 2023-04-02 RX ADMIN — IBUPROFEN 600 MG: 600 TABLET, FILM COATED ORAL at 04:46

## 2023-04-02 RX ADMIN — DOCUSATE SODIUM 100 MG: 100 CAPSULE, LIQUID FILLED ORAL at 20:54

## 2023-04-02 RX ADMIN — IBUPROFEN 600 MG: 600 TABLET, FILM COATED ORAL at 00:53

## 2023-04-02 RX ADMIN — IBUPROFEN 600 MG: 600 TABLET, FILM COATED ORAL at 12:20

## 2023-04-02 RX ADMIN — FERROUS SULFATE TAB 325 MG (65 MG ELEMENTAL FE) 325 MG: 325 (65 FE) TAB at 07:53

## 2023-04-02 RX ADMIN — PRENATAL VITAMINS-IRON FUMARATE 27 MG IRON-FOLIC ACID 0.8 MG TABLET 1 TABLET: at 07:53

## 2023-04-02 RX ADMIN — SIMETHICONE 80 MG: 80 TABLET, CHEWABLE ORAL at 07:53

## 2023-04-02 NOTE — SIGNIFICANT NOTE
04/02/23 1252   SLP Deferred Reason   SLP Deferred Reason   (SLP acknowledged order and provided business card for OP services if warranted and baby discharges before Peds SLP consults on 4/3/23.)

## 2023-04-02 NOTE — PROGRESS NOTES
2023    Name:Char Villatoro    MR#:[id number]     PROGRESS NOTE:  Post-Op Day 2 S/P    HD:3    Subjective   32 y.o. yo Female  s/p CS at 39w2d doing well. Pain well controlled. Tolerating regular diet and having flatus. Lochia normal.     Patient Active Problem List   Diagnosis   • Pregnancy   • Pregnant        Objective    Vitals  Temp:  Temp:  [97.7 °F (36.5 °C)-98.2 °F (36.8 °C)] 97.7 °F (36.5 °C)  Temp src: Oral  BP:  BP: ()/(54-70) 115/70  Pulse:  Heart Rate:  [80-91] 82  RR:   Resp:  [16] 16    General Awake, alert, no distress  Abdomen Soft, non-distended, fundus firm, below umbilicus, appropriately tender  Incision  Intact, no erythema or exudate. Staples present.  Extremities Calves NT bilaterally     I/O last 3 completed shifts:  In: -   Out: 3850 [Urine:3850]    LABS:   Lab Results   Component Value Date    WBC 12.38 (H) 2023    HGB 8.9 (L) 2023    HCT 27.1 (L) 2023    MCV 89.1 2023     2023       Infant: female . Doing well.                            Assessment   1.  POD 2, PCS- FTP. Doing well.    2.  Anemic. Asymptomatic. VSS.     Plan:   1. Doing well. Routine postoperative care. Advance.   2. Continue PO iron w/ constipation precautions.    Active Problems:   None      Debbie Mcgregor, STEFF  2023 13:41 EDT

## 2023-04-03 VITALS
HEART RATE: 75 BPM | SYSTOLIC BLOOD PRESSURE: 104 MMHG | WEIGHT: 257 LBS | RESPIRATION RATE: 18 BRPM | HEIGHT: 68 IN | TEMPERATURE: 98.7 F | DIASTOLIC BLOOD PRESSURE: 68 MMHG | BODY MASS INDEX: 38.95 KG/M2 | OXYGEN SATURATION: 96 %

## 2023-04-03 PROCEDURE — 90471 IMMUNIZATION ADMIN: CPT | Performed by: OBSTETRICS & GYNECOLOGY

## 2023-04-03 PROCEDURE — 0503F POSTPARTUM CARE VISIT: CPT

## 2023-04-03 PROCEDURE — 90707 MMR VACCINE SC: CPT | Performed by: OBSTETRICS & GYNECOLOGY

## 2023-04-03 PROCEDURE — 25010000002 MEASLES, MUMPS & RUBELLA VAC RECONSTITUTED SOLUTION: Performed by: OBSTETRICS & GYNECOLOGY

## 2023-04-03 RX ORDER — OXYCODONE HYDROCHLORIDE 5 MG/1
5 TABLET ORAL EVERY 6 HOURS PRN
Qty: 12 TABLET | Refills: 0 | Status: SHIPPED | OUTPATIENT
Start: 2023-04-03

## 2023-04-03 RX ORDER — IBUPROFEN 600 MG/1
600 TABLET ORAL EVERY 6 HOURS
Qty: 90 TABLET | Refills: 0 | Status: SHIPPED | OUTPATIENT
Start: 2023-04-03

## 2023-04-03 RX ADMIN — ACETAMINOPHEN 325MG 650 MG: 325 TABLET ORAL at 10:06

## 2023-04-03 RX ADMIN — PRENATAL VITAMINS-IRON FUMARATE 27 MG IRON-FOLIC ACID 0.8 MG TABLET 1 TABLET: at 08:06

## 2023-04-03 RX ADMIN — DOCUSATE SODIUM 100 MG: 100 CAPSULE, LIQUID FILLED ORAL at 08:06

## 2023-04-03 RX ADMIN — FERROUS SULFATE TAB 325 MG (65 MG ELEMENTAL FE) 325 MG: 325 (65 FE) TAB at 08:06

## 2023-04-03 RX ADMIN — MEASLES, MUMPS, AND RUBELLA VIRUS VACCINE LIVE 0.5 ML: 1000; 12500; 1000 INJECTION, POWDER, LYOPHILIZED, FOR SUSPENSION SUBCUTANEOUS at 05:23

## 2023-04-03 RX ADMIN — IBUPROFEN 600 MG: 600 TABLET, FILM COATED ORAL at 04:53

## 2023-04-03 RX ADMIN — SIMETHICONE 80 MG: 80 TABLET, CHEWABLE ORAL at 08:06

## 2023-04-03 RX ADMIN — DOCUSATE SODIUM 100 MG: 100 CAPSULE, LIQUID FILLED ORAL at 10:06

## 2023-04-03 RX ADMIN — ACETAMINOPHEN 325MG 650 MG: 325 TABLET ORAL at 04:53

## 2023-04-03 NOTE — DISCHARGE SUMMARY
Discharge Summary    Date of Admission: 3/30/2023  Date of Discharge:  4/3/2023      Patient: Char Villatoro      MR#:[id number]    Primary Surgeon/OB: Yuridia Lu MD    Discharge Surgeon/OB: Aly/Mary    Presenting Problem/History of Present Illness  Pregnant [Z34.90]     Patient Active Problem List   Diagnosis   • Pregnancy   • Pregnant         Discharge Diagnosis:  section at 39w2d    Procedures:  , Low Transverse     3/31/2023    11:49 AM          Discharge Date: 4/3/2023; Discharge Time: 09:41 EDT    Early Discharge:  NO    Hospital Course  Patient is a 32 y.o. female  at 39w2d status post  section with uneventful postoperative recovery.  Patient was advanced to regular diet on postoperative day#1. Patient reported lochia as mild and pain managed. On discharge, ambulating, tolerating a regular diet without any difficulties and her incision is dry, clean and intact.     Infant:   female  fetus 4211 g (9 lb 4.5 oz)  with Apgar scores of 8 , 9  at five minutes.    Condition on Discharge:  Stable    Vital Signs  Temp:  [97.9 °F (36.6 °C)-98.7 °F (37.1 °C)] 98.7 °F (37.1 °C)  Heart Rate:  [75-87] 75  Resp:  [16-18] 18  BP: (104-117)/(63-74) 104/68    Lab Results   Component Value Date    WBC 12.38 (H) 2023    HGB 8.9 (L) 2023    HCT 27.1 (L) 2023    MCV 89.1 2023     2023       Discharge Disposition  Home or Self Care    Discharge Medications     Discharge Medications      New Medications      Instructions Start Date   ibuprofen 600 MG tablet  Commonly known as: ADVIL,MOTRIN   600 mg, Oral, Every 6 Hours      oxyCODONE 5 MG immediate release tablet  Commonly known as: ROXICODONE   5 mg, Oral, Every 6 Hours PRN         Continue These Medications      Instructions Start Date   PRENATAL VITAMIN PO   Oral         Stop These Medications    cetirizine 10 MG tablet  Commonly known as: zyrTEC     lansoprazole 30 MG capsule  Commonly known as:  Prevacid            Activity at Discharge:   Activity Instructions     Driving Restrictions      Type of Restriction: Driving    Driving Restrictions: No Driving (Time Limited)    Length: 2 Weeks    Lifting Restrictions      Type of Restriction: Lifting    Lifting Restrictions: Lifting Restriction (Indicate Limit)    Weight Limit (Pounds): 15    Length of Lifting Restriction: 6 weeks    Pelvic Rest      No Sex, Tampons, Swimming, Tub Baths x 6 weeks          Follow-up Appointments  Future Appointments   Date Time Provider Department Center   4/17/2023 11:15 AM Aurelia Ruiz APRN [facility]  [facility]   5/9/2023  2:40 PM Eunice Hernandez MD [facility]  [facility]     Additional Instructions for the Follow-ups that You Need to Schedule     Call MD With Problems / Concerns   As directed      Call for symptoms related to depression, foul smelling discharge, fever >/=100.4F, blood clots bigger than or equal to golf ball size, saturating through 1 pad or less per hour.    Order Comments: Call for symptoms related to depression, foul smelling discharge, fever >/=100.4F, blood clots bigger than or equal to golf ball size, saturating through 1 pad or less per hour.          Discharge Follow-up with Specified Provider: Mary/ANIYAH; 2 Weeks   As directed      To: Mary/ANIYAH    Follow Up: 2 Weeks    Follow Up Details: Incision check               STEFF Irwin  04/03/23  09:41 EDT  Csd

## 2023-04-17 ENCOUNTER — POSTPARTUM VISIT (OUTPATIENT)
Dept: OBSTETRICS AND GYNECOLOGY | Facility: CLINIC | Age: 33
End: 2023-04-17
Payer: COMMERCIAL

## 2023-04-17 VITALS — DIASTOLIC BLOOD PRESSURE: 68 MMHG | WEIGHT: 226.6 LBS | BODY MASS INDEX: 34.45 KG/M2 | SYSTOLIC BLOOD PRESSURE: 110 MMHG

## 2023-04-17 PROCEDURE — 0503F POSTPARTUM CARE VISIT: CPT | Performed by: NURSE PRACTITIONER

## 2023-04-17 NOTE — PROGRESS NOTES
Chief Complaint   Patient presents with   • Postpartum Care       2 Week Postpartum Visit         Char Villatoro is a 32 y.o.  who presents today for a 2 week postpartum check.        , Low Transverse    Information for the patient's :  Stephanie Villatoro [[id number]]   3/31/2023   female   Stephanie Villatoro   4211 g (9 lb 4.5 oz)   Gestational Age: 39w2d      Baby Discharged with Mom  Delivering MD: uYridia Lu MD.    Pregnancy complications: no known issues    Patient reports her incision is clean, dry, intact. Patient describes vaginal bleeding as light.  She is breast feeding. Patient denies bowel or bladder issues.    She desires contraceptive methods: Abstinence for contraception.    Patient denies postpartum depression. Postpartum Depression Screening Questionnaire: 0, no treatment indicated.      The additional following portions of the patient's history were reviewed and updated as appropriate: allergies and current medications.      Review of Systems   Constitutional: Negative.    Cardiovascular: Negative.    Gastrointestinal: Negative.    Genitourinary: Negative.    Psychiatric/Behavioral: Negative.        I have reviewed and agree with the HPI, ROS, and historical information as entered above. Aurelia Joseph, APRN    Objective   /68   Wt 103 kg (226 lb 9.6 oz)   LMP 2022   Breastfeeding Yes   BMI 34.45 kg/m²     Physical Exam  Vitals and nursing note reviewed.   Constitutional:       Appearance: Normal appearance.   Abdominal:      Palpations: Abdomen is soft.      Tenderness: There is no abdominal tenderness.      Comments:  incision CDI without redness   Neurological:      Mental Status: She is alert.              Assessment and Plan    Problem List Items Addressed This Visit    None      1. S/p , 2 weeks postpartum.  Doing well.    2. Continued pelvic rest with a return to driving and light physical activity.  3. Baby doing  well.  4. Breastfeeding going well.  5. No si/sx of postpartum depression  6. Contraception: contraceptive methods: Abstinence  7.   RTC in 4 weeks with  for 6 week post  visit.     Aurelia Ruiz, APRN  2023

## 2023-05-09 ENCOUNTER — POSTPARTUM VISIT (OUTPATIENT)
Dept: OBSTETRICS AND GYNECOLOGY | Facility: CLINIC | Age: 33
End: 2023-05-09
Payer: COMMERCIAL

## 2023-05-09 VITALS
DIASTOLIC BLOOD PRESSURE: 60 MMHG | BODY MASS INDEX: 34.22 KG/M2 | HEIGHT: 68 IN | SYSTOLIC BLOOD PRESSURE: 100 MMHG | WEIGHT: 225.8 LBS

## 2023-05-09 DIAGNOSIS — Z30.41 ENCOUNTER FOR SURVEILLANCE OF CONTRACEPTIVE PILLS: ICD-10-CM

## 2023-05-09 RX ORDER — ACETAMINOPHEN AND CODEINE PHOSPHATE 120; 12 MG/5ML; MG/5ML
1 SOLUTION ORAL DAILY
Qty: 84 TABLET | Refills: 3 | Status: SHIPPED | OUTPATIENT
Start: 2023-05-09 | End: 2024-05-08

## 2023-05-09 NOTE — PROGRESS NOTES
"        Chief Complaint   Patient presents with   • Postpartum Care       6 Week Postpartum Visit         Char Villatoro is a 32 y.o.  who presents today for a 6 week postpartum check.     C/S: failure to progress     , Low Transverse    Information for the patient's :  Stephanie Villatoro [[id number]]   3/31/2023   female   Stephanie Villatoro   4211 g (9 lb 4.5 oz)   Gestational Age: 39w2d          Baby Discharged: Discharged with Mom  Delivering Physician: Yuridia Lu MD    At the time of delivery were you diagnosed with any of the following: None. The incision is healing well. Patient describes vaginal bleeding as absent.  Patient is breast feeding.  She desires contraceptive methods: Oral progesterone-only contraceptive for contraception.  Patient denies bowel or bladder issues.      Patient denies postpartum depression. Postpartum Depression Screening Questionnaire: 0, no treatment indicated.      Last Pap : 21. Results: negative. HPV: negative.   Last Completed Pap Smear          PAP SMEAR (Every 3 Years) Next due on 2021  SCANNED - PAP SMEAR    2020  Done - neg                  The additional following portions of the patient's history were reviewed and updated as appropriate: allergies and current medications.    Review of Systems   Constitutional: Negative.    HENT: Negative.    Eyes: Negative.    Respiratory: Negative.    Cardiovascular: Negative.    Gastrointestinal: Negative.    Endocrine: Negative.    Genitourinary: Negative.    Musculoskeletal: Negative.    Skin: Negative.    Allergic/Immunologic: Negative.    Neurological: Negative.    Hematological: Negative.    Psychiatric/Behavioral: Negative.      All other systems reviewed and are negative.     I have reviewed and agree with the HPI, ROS, and historical information as entered above. Eunice Hernandez MD    /60   Ht 172.7 cm (68\")   Wt 102 kg (225 lb 12.8 oz)   LMP 2022   " Breastfeeding Yes   BMI 34.33 kg/m²     Physical Exam  Vitals and nursing note reviewed. Exam conducted with a chaperone present.   Constitutional:       Appearance: She is well-developed.   HENT:      Head: Normocephalic and atraumatic.   Neck:      Thyroid: No thyroid mass or thyromegaly.   Pulmonary:      Breath sounds: No rhonchi.   Abdominal:      Palpations: Abdomen is soft. Abdomen is not rigid. There is no mass.      Tenderness: There is no abdominal tenderness. There is no guarding.      Hernia: No hernia is present.      Comments: Incision C/D/I   Genitourinary:     Vagina: Normal.      Cervix: Normal.      Uterus: Normal.    Musculoskeletal:      Cervical back: Normal range of motion. No muscular tenderness.   Neurological:      Mental Status: She is alert and oriented to person, place, and time.   Psychiatric:         Behavior: Behavior normal.             Assessment and Plan    Problem List Items Addressed This Visit    None  Visit Diagnoses     Postpartum exam    -  Primary    Encounter for surveillance of contraceptive pills              1. S/p , 6 weeks postpartum.  Doing well.    2. Return to normal physical activity.  No pelvic restrictions.  3. Counseled on POP   4. Baby doing well.  5. Breastfeeding going well.  6. Contraception: contraceptive methods: None  7. Return in about 1 year (around 2024).     Eunice Hernandez MD  2023

## 2023-06-26 ENCOUNTER — TELEPHONE (OUTPATIENT)
Dept: OBSTETRICS AND GYNECOLOGY | Facility: CLINIC | Age: 33
End: 2023-06-26

## 2024-03-22 DIAGNOSIS — Z30.011 ENCOUNTER FOR INITIAL PRESCRIPTION OF CONTRACEPTIVE PILLS: Primary | ICD-10-CM

## 2024-03-22 RX ORDER — NORETHINDRONE ACETATE AND ETHINYL ESTRADIOL 1MG-20(21)
1 KIT ORAL DAILY
Qty: 28 TABLET | Refills: 2 | Status: SHIPPED | OUTPATIENT
Start: 2024-03-22

## 2024-04-05 RX ORDER — NORETHINDRONE 0.35 MG/1
1 TABLET ORAL DAILY
Qty: 84 TABLET | Refills: 3 | OUTPATIENT
Start: 2024-04-05

## 2024-06-27 ENCOUNTER — OFFICE VISIT (OUTPATIENT)
Dept: OBSTETRICS AND GYNECOLOGY | Facility: CLINIC | Age: 34
End: 2024-06-27
Payer: COMMERCIAL

## 2024-06-27 VITALS
WEIGHT: 239 LBS | BODY MASS INDEX: 36.22 KG/M2 | DIASTOLIC BLOOD PRESSURE: 70 MMHG | HEIGHT: 68 IN | SYSTOLIC BLOOD PRESSURE: 108 MMHG

## 2024-06-27 DIAGNOSIS — B00.1 RECURRENT COLD SORES: ICD-10-CM

## 2024-06-27 DIAGNOSIS — R39.9 UTI SYMPTOMS: ICD-10-CM

## 2024-06-27 DIAGNOSIS — Z30.011 ENCOUNTER FOR INITIAL PRESCRIPTION OF CONTRACEPTIVE PILLS: ICD-10-CM

## 2024-06-27 DIAGNOSIS — R82.90 ABNORMAL FINDING ON URINALYSIS: ICD-10-CM

## 2024-06-27 DIAGNOSIS — Z01.419 ROUTINE GYNECOLOGICAL EXAMINATION: Primary | ICD-10-CM

## 2024-06-27 PROBLEM — Z34.90 PREGNANCY: Status: RESOLVED | Noted: 2022-11-17 | Resolved: 2024-06-27

## 2024-06-27 PROBLEM — Z34.90 PREGNANT: Status: RESOLVED | Noted: 2023-03-30 | Resolved: 2024-06-27

## 2024-06-27 LAB
BILIRUB BLD-MCNC: NEGATIVE MG/DL
GLUCOSE UR STRIP-MCNC: NEGATIVE MG/DL
KETONES UR QL: NEGATIVE
LEUKOCYTE EST, POC: ABNORMAL
NITRITE UR-MCNC: NEGATIVE MG/ML
PH UR: 6 [PH] (ref 5–8)
PROT UR STRIP-MCNC: NEGATIVE MG/DL
RBC # UR STRIP: ABNORMAL /UL
SP GR UR: 1.02 (ref 1–1.03)
UROBILINOGEN UR QL: NORMAL

## 2024-06-27 RX ORDER — ACYCLOVIR 400 MG/1
800 TABLET ORAL 2 TIMES DAILY
Qty: 20 TABLET | Refills: 6 | Status: SHIPPED | OUTPATIENT
Start: 2024-06-27 | End: 2024-06-27 | Stop reason: SDUPTHER

## 2024-06-27 RX ORDER — NORETHINDRONE ACETATE AND ETHINYL ESTRADIOL 1MG-20(21)
1 KIT ORAL DAILY
Qty: 84 TABLET | Refills: 3 | Status: SHIPPED | OUTPATIENT
Start: 2024-06-27

## 2024-06-27 RX ORDER — ACYCLOVIR 200 MG/1
CAPSULE ORAL
COMMUNITY
End: 2024-06-27

## 2024-06-27 RX ORDER — VALACYCLOVIR HYDROCHLORIDE 1 G/1
1000 TABLET, FILM COATED ORAL 2 TIMES DAILY
Qty: 10 TABLET | Refills: 6 | Status: SHIPPED | OUTPATIENT
Start: 2024-06-27

## 2024-06-27 RX ORDER — VALACYCLOVIR HYDROCHLORIDE 1 G/1
1000 TABLET, FILM COATED ORAL 2 TIMES DAILY
COMMUNITY
Start: 2024-06-25 | End: 2024-06-27 | Stop reason: SDUPTHER

## 2024-06-27 NOTE — PROGRESS NOTES
Gynecologic Annual Exam Note        Gynecologic Exam        Subjective     HPI  Char Villatoro is a 33 y.o.  female who presents for annual well woman exam as an established patient. There were no changes to her medical or surgical history since her last visit.. Patient's last menstrual period was 2024 (approximate).  Her period just started back last month after giving birth and breastfeeding. She switched from micronor to her OCP in the end of March. She had her period twice last month, and has started spotting again today.   The flow is light-moderate. She reports dysmenorrhea is mild occurring first 1-2 days of flow. Marital Status: .  She is sexually active. She has not had new partners.. STD testing recommendations have been explained to the patient and she does not desire STD testing.    The patient would like to discuss the following complaints today: Reports that this week she has had frequent urination. She was at the beach last week and the frequency started Saturday after they returned. Denies urgency, pelvic pain, dysuria, or vaginal symptoms. It has started resolving.      Additional OB/GYN History   contraceptive methods: OCP (estrogen/progesterone)  Desires to: continue contraception  Thromboembolic Disease: none  History of migraines: no  Age of menarche: 16    History of STD: no    Last Pap : 21. Results: negative. HPV: negative.   Last Completed Pap Smear       This patient has no relevant Health Maintenance data.             History of abnormal Pap smear: yes - LEEP 2018  Gardasil status: thinks that she only had the first one  Family history of uterine, colon, breast, or ovarian cancer: yes - maternal aunt-breast  Performs monthly Self-Breast Exam: yes  Exercises Regularly:yes  Feelings of Anxiety or Depression: no  Tobacco Usage?: No       Current Outpatient Medications:     norethindrone-ethinyl estradiol FE (Blisovi FE ) 1-20 MG-MCG per tablet, Take 1  tablet by mouth Daily., Disp: 84 tablet, Rfl: 3    valACYclovir (VALTREX) 1000 MG tablet, Take 1 tablet by mouth 2 (Two) Times a Day., Disp: 10 tablet, Rfl: 6     Patient is requesting refills of blisovi.    OB History          1    Para   1    Term   1       0    AB   0    Living   1         SAB   0    IAB   0    Ectopic   0    Molar   0    Multiple   0    Live Births   1                Health Maintenance   Topic Date Due    BMI FOLLOWUP  Never done    ANNUAL PHYSICAL  Never done    Annual Gynecologic Pelvic and Breast Exam  2023    COVID-19 Vaccine (2023- season) 2023    PAP SMEAR  2024    INFLUENZA VACCINE  2024    TDAP/TD VACCINES (2 - Td or Tdap) 2033    HEPATITIS C SCREENING  Completed    Pneumococcal Vaccine 0-64  Aged Out       Past Medical History:   Diagnosis Date    Abnormal Pap smear of cervix         Past Surgical History:   Procedure Laterality Date    CERVICAL BIOPSY  W/ LOOP ELECTRODE EXCISION       SECTION N/A 3/31/2023    Procedure:  SECTION PRIMARY;  Surgeon: Yuridia Lu MD;  Location: [facility] LABOR DELIVERY;  Service: Obstetrics/Gynecology;  Laterality: N/A;    LEEP  2018    WISDOM TOOTH EXTRACTION         The additional following portions of the patient's history were reviewed and updated as appropriate: allergies, current medications, past family history, past medical history, past social history, past surgical history, and problem list.    Review of Systems   Constitutional: Negative.    HENT: Negative.     Eyes: Negative.    Respiratory: Negative.     Cardiovascular: Negative.    Gastrointestinal: Negative.    Endocrine: Negative.    Genitourinary: Negative.    Musculoskeletal: Negative.    Skin: Negative.    Allergic/Immunologic: Negative.    Neurological: Negative.    Hematological: Negative.    Psychiatric/Behavioral: Negative.           I have reviewed and agree with the HPI, ROS, and historical information as  "entered above. Cornelia Crowelld, APRN          Objective   /70 (BP Location: Right arm, Patient Position: Sitting, Cuff Size: Adult)   Ht 172.7 cm (68\")   Wt 108 kg (239 lb)   LMP 06/13/2024 (Approximate)   Breastfeeding No   BMI 36.34 kg/m²     Physical Exam  Vitals and nursing note reviewed. Exam conducted with a chaperone present.   Constitutional:       General: She is not in acute distress.     Appearance: Normal appearance. She is well-developed. She is not ill-appearing.   Neck:      Thyroid: No thyroid mass or thyromegaly.   Pulmonary:      Effort: Pulmonary effort is normal. No respiratory distress or retractions.   Chest:      Chest wall: No mass.   Breasts:     Right: Normal. No mass, nipple discharge, skin change or tenderness.      Left: Normal. No mass, nipple discharge, skin change or tenderness.      Comments: Fibrocystic tissue present bilaterally    Abdominal:      General: There is no distension.      Palpations: Abdomen is soft. Abdomen is not rigid. There is no mass.      Tenderness: There is no abdominal tenderness. There is no guarding or rebound.      Hernia: No hernia is present.   Genitourinary:     General: Normal vulva.      Exam position: Lithotomy position.      Labia:         Right: No rash, tenderness or lesion.         Left: No rash, tenderness or lesion.       Vagina: Normal. Bleeding present. No vaginal discharge or lesions.      Cervix: Normal. Cervical bleeding present. No cervical motion tenderness, friability or erythema.      Uterus: Normal. Not enlarged, not fixed and not tender.       Adnexa: Right adnexa normal and left adnexa normal.        Right: No mass or tenderness.          Left: No mass or tenderness.        Rectum: Normal. No external hemorrhoid.      Comments: Spotting today- dark red/brown blood  Musculoskeletal:      Cervical back: No muscular tenderness.   Skin:     General: Skin is warm and dry.   Neurological:      Mental Status: She is alert and " oriented to person, place, and time.   Psychiatric:         Mood and Affect: Mood normal.         Behavior: Behavior normal.            Assessment and Plan    Problem List Items Addressed This Visit       Recurrent cold sores    Relevant Medications    valACYclovir (VALTREX) 1000 MG tablet     Other Visit Diagnoses       Routine gynecological examination    -  Primary    Relevant Orders    LIQUID-BASED PAP SMEAR WITH HPV GENOTYPING REGARDLESS OF INTERPRETATION (DORYS,COR,MAD)    UTI symptoms        Relevant Orders    POC Urinalysis Dipstick (Completed)    Encounter for initial prescription of contraceptive pills        Relevant Medications    norethindrone-ethinyl estradiol FE (Blisovi FE 1/20) 1-20 MG-MCG per tablet            GYN annual well woman exam.   Reviewed pap guidelines. Obtained today.   Urinalysis positive for blood and leukocytes-could be contamination since currently bleeding. Will send for culture. Pt is not having significant discomfort and is okay with waiting for results to treat if positive.   Refills sent of OCP and Valtrex.   Fibrocystic breast changes - Encouraged decreasing caffeine, supportive bra, low dose vitamin E supplementation.  Reviewed monthly self breast exams.  Instructed to call with lumps, pain, or breast discharge.    RTC in 1 year or PRN with problems  Return in about 1 year (around 6/27/2025) for Annual physical.    Cornelia Vickers, STEFF  06/27/2024           Statement Selected

## 2024-06-30 LAB
BACTERIA UR CULT: ABNORMAL
BACTERIA UR CULT: ABNORMAL

## 2024-07-01 RX ORDER — NITROFURANTOIN 25; 75 MG/1; MG/1
100 CAPSULE ORAL 2 TIMES DAILY
Qty: 6 CAPSULE | Refills: 0 | Status: SHIPPED | OUTPATIENT
Start: 2024-07-01 | End: 2024-07-04

## 2024-07-01 NOTE — PROGRESS NOTES
Please notify of urine culture positive for UTI.  Antibiotic has been sent in and will change if needed based on sensitivities

## 2024-07-02 LAB
BACTERIA UR CULT: ABNORMAL
BACTERIA UR CULT: ABNORMAL
OTHER ANTIBIOTIC SUSC ISLT: ABNORMAL

## 2024-07-05 DIAGNOSIS — N30.01 ACUTE CYSTITIS WITH HEMATURIA: Primary | ICD-10-CM

## 2024-07-05 LAB — REF LAB TEST METHOD: NORMAL

## 2024-07-05 RX ORDER — NITROFURANTOIN 25; 75 MG/1; MG/1
100 CAPSULE ORAL 2 TIMES DAILY
Qty: 14 CAPSULE | Refills: 0 | Status: SHIPPED | OUTPATIENT
Start: 2024-07-05 | End: 2024-07-12

## 2025-03-25 ENCOUNTER — INITIAL PRENATAL (OUTPATIENT)
Dept: OBSTETRICS AND GYNECOLOGY | Facility: CLINIC | Age: 35
End: 2025-03-25
Payer: COMMERCIAL

## 2025-03-25 VITALS — BODY MASS INDEX: 36.61 KG/M2 | WEIGHT: 240.8 LBS | SYSTOLIC BLOOD PRESSURE: 110 MMHG | DIASTOLIC BLOOD PRESSURE: 78 MMHG

## 2025-03-25 DIAGNOSIS — Z36.9 ENCOUNTER FOR ANTENATAL SCREENING: Primary | ICD-10-CM

## 2025-03-25 DIAGNOSIS — Z98.891 PREVIOUS CESAREAN SECTION: ICD-10-CM

## 2025-03-25 DIAGNOSIS — Z98.890 H/O LEEP: ICD-10-CM

## 2025-03-25 PROCEDURE — 0501F PRENATAL FLOW SHEET: CPT | Performed by: OBSTETRICS & GYNECOLOGY

## 2025-03-25 RX ORDER — METOCLOPRAMIDE 10 MG/1
10 TABLET ORAL
Qty: 120 TABLET | Refills: 1 | Status: SHIPPED | OUTPATIENT
Start: 2025-03-25

## 2025-03-25 RX ORDER — FAMOTIDINE 20 MG/1
20 TABLET, FILM COATED ORAL 2 TIMES DAILY
COMMUNITY

## 2025-03-25 RX ORDER — PNV NO.95/FERROUS FUM/FOLIC AC 28MG-0.8MG
TABLET ORAL
COMMUNITY

## 2025-03-25 RX ORDER — PROMETHAZINE HYDROCHLORIDE 12.5 MG/1
12.5 TABLET ORAL EVERY 6 HOURS PRN
COMMUNITY

## 2025-03-25 RX ORDER — FLUTICASONE PROPIONATE 50 MCG
2 SPRAY, SUSPENSION (ML) NASAL DAILY
COMMUNITY

## 2025-03-25 NOTE — PROGRESS NOTES
Initial ob visit     CC- Here for care of pregnancy        Char Villatoro is a 34 y.o. female, , who presents for her first obstetrical visit.  Patient's last menstrual period was 2025 (exact date).. Her ESTEPHANIE is 2025, by Last Menstrual Period. Current GA is 8w0d.     Initial positive test date : 25, UPT        Her periods are every 23-25 days.  Prior obstetric issues: C/S for FTP  Patient's past medical history is significant for: previous LEEP & Cold sores.  Family history of genetic issues (includes FOB): none  Prior infections concerning in pregnancy (Rash, fever in last 2 weeks): No  Varicella Hx - history of chicken pox  Prior testing for Cystic Fibrosis Carrier or Sickle Cell Trait- none  Prepregnancy BMI - Body mass index is 36.61 kg/m².  History of STD: no  Hx of HSV for patient or partner: yes - Patient has cold sores  Ultrasound Today: yes, SIUP.  bpm.    OB History    Para Term  AB Living   2 1 1 0 0 1   SAB IAB Ectopic Molar Multiple Live Births   0 0 0 0 0 1      # Outcome Date GA Lbr Meir/2nd Weight Sex Type Anes PTL Lv   2 Current            1 Term 23 39w2d  4211 g (9 lb 4.5 oz) F CS-LTranv EPI N SUZE      Complications: Failure to Progress in First Stage       Additional Pertinent History   Last Pap : 24 Result: negative HPV: negative     Last Completed Pap Smear            Upcoming       PAP SMEAR (Every 3 Years) Next due on 2024  LIQUID-BASED PAP SMEAR WITH HPV GENOTYPING REGARDLESS OF INTERPRETATION (DORYS,COR,MAD)    2021  SCANNED - PAP SMEAR    2020  Done - neg                          History of abnormal Pap smear: yes - Hx of LEEP in 2018  Family history of uterine, colon, breast, or ovarian cancer: yes - Breast Ca- [place]  Feelings of Anxiety or Depression: no  Tobacco Usage?: No   Alcohol/Drug Use?: NO  Over the age of 35 at delivery: no  Genetic Screening: desires cell free DNA  Flu Status:  Declines    PMH    Current Outpatient Medications:     famotidine (PEPCID) 20 MG tablet, Take 1 tablet by mouth 2 (Two) Times a Day. As needed, Disp: , Rfl:     fluticasone (FLONASE) 50 MCG/ACT nasal spray, Administer 2 sprays into the nostril(s) as directed by provider Daily., Disp: , Rfl:     Prenatal Vit-Fe Fumarate-FA (Prenatal Vitamin) 27-0.8 MG tablet, Take  by mouth., Disp: , Rfl:     promethazine (PHENERGAN) 12.5 MG tablet, Take 1 tablet by mouth Every 6 (Six) Hours As Needed for Nausea or Vomiting., Disp: , Rfl:     norethindrone-ethinyl estradiol FE (Blisovi FE ) 1-20 MG-MCG per tablet, Take 1 tablet by mouth Daily., Disp: 84 tablet, Rfl: 3    valACYclovir (VALTREX) 1000 MG tablet, Take 1 tablet by mouth 2 (Two) Times a Day., Disp: 10 tablet, Rfl: 6     Past Medical History:   Diagnosis Date    Abnormal Pap smear of cervix     H/O cold sores         Past Surgical History:   Procedure Laterality Date    CERVICAL BIOPSY  W/ LOOP ELECTRODE EXCISION  2018     SECTION N/A 3/31/2023    Procedure:  SECTION PRIMARY;  Surgeon: Yuridia Lu MD;  Location: [facility] DELIVERY;  Service: Obstetrics/Gynecology;  Laterality: N/A;    LEEP  2018    WISDOM TOOTH EXTRACTION  2019       Review of Systems   Review of Systems    Patient Reports:  frequent nausea & vomiting  Patient Denies:excessive nausea , excessive vomiting, and vaginal bleeding  All systems reviewed and otherwise normal.    I have reviewed and agree with the HPI, ROS, and historical information as entered above. Eunice Hernandez MD      /78   Wt 109 kg (240 lb 12.8 oz)   LMP 2025 (Exact Date)   BMI 36.61 kg/m²     The additional following portions of the patient's history were reviewed and updated as appropriate: allergies, current medications, past family history, past medical history, past social history, past surgical history, and problem list.    Physical Exam  General:  well developed; well nourished  no acute  distress  mentation appropriate   Chest/Respiratory: No labored breathing, normal respiratory effort, normal appearance, no respiratory noises noted   Heart:  normal rate, regular rhythm,  no murmurs, rubs, or gallops   Thyroid: normal to inspection and palpation   Breasts:  Not performed.   Abdomen: soft, non-tender; no masses  no umbilical or inguinal hernias are present  no hepato-splenomegaly   Pelvis: Not performed.        Assessment and Plan    Problem List Items Addressed This Visit          Genitourinary and Reproductive     H/O LEEP    Overview   [ ] Cervical length at 16 and 20 weeks             Gravid and     Previous  section     Other Visit Diagnoses         Encounter for  screening    -  Primary    Relevant Orders    Obstetric Panel    HIV-1 / O / 2 Ag / Antibody    Urine Culture - Urine, Urine, Clean Catch    Urinalysis With Microscopic - Urine, Clean Catch    Chlamydia trachomatis, Neisseria gonorrhoeae, PCR - Urine, Urine, Clean Catch    Urine Drug Screen - Urine, Clean Catch            Pregnancy at 8w0d  Reviewed routine prenatal care with the office and educational materials given  Discussed options for genetic testing including first trimester nuchal translucency screen, genetic disease carrier testing, quadruple screen, and NIPT  Severe nausea - continue B6/dox, add Reglan, pepcid nightly.    Return in about 4 weeks (around 2025).      Eunice Hernandez MD  2025

## 2025-03-26 LAB
ABO GROUP BLD: NORMAL
AMPHETAMINES UR QL SCN: NEGATIVE NG/ML
APPEARANCE UR: CLEAR
BACTERIA #/AREA URNS HPF: NORMAL /[HPF]
BARBITURATES UR QL SCN: NEGATIVE NG/ML
BASOPHILS # BLD AUTO: 0 X10E3/UL (ref 0–0.2)
BASOPHILS NFR BLD AUTO: 0 %
BENZODIAZ UR QL SCN: NEGATIVE NG/ML
BILIRUB UR QL STRIP: NEGATIVE
BLD GP AB SCN SERPL QL: NEGATIVE
BZE UR QL SCN: NEGATIVE NG/ML
CANNABINOIDS UR QL SCN: NEGATIVE NG/ML
CASTS URNS QL MICRO: NORMAL /LPF
COLOR UR: YELLOW
CREAT UR-MCNC: 48.4 MG/DL (ref 20–300)
EOSINOPHIL # BLD AUTO: 0 X10E3/UL (ref 0–0.4)
EOSINOPHIL NFR BLD AUTO: 0 %
EPI CELLS #/AREA URNS HPF: NORMAL /HPF (ref 0–10)
ERYTHROCYTE [DISTWIDTH] IN BLOOD BY AUTOMATED COUNT: 12.2 % (ref 11.7–15.4)
GLUCOSE UR QL STRIP: NEGATIVE
HBV SURFACE AG SERPL QL IA: NEGATIVE
HCT VFR BLD AUTO: 42 % (ref 34–46.6)
HCV IGG SERPL QL IA: NON REACTIVE
HGB BLD-MCNC: 13.9 G/DL (ref 11.1–15.9)
HGB UR QL STRIP: NEGATIVE
HIV 1+2 AB+HIV1 P24 AG SERPL QL IA: NON REACTIVE
IMM GRANULOCYTES # BLD AUTO: 0 X10E3/UL (ref 0–0.1)
IMM GRANULOCYTES NFR BLD AUTO: 0 %
KETONES UR QL STRIP: NEGATIVE
LABORATORY COMMENT REPORT: NORMAL
LEUKOCYTE ESTERASE UR QL STRIP: ABNORMAL
LYMPHOCYTES # BLD AUTO: 2.1 X10E3/UL (ref 0.7–3.1)
LYMPHOCYTES NFR BLD AUTO: 23 %
MCH RBC QN AUTO: 29 PG (ref 26.6–33)
MCHC RBC AUTO-ENTMCNC: 33.1 G/DL (ref 31.5–35.7)
MCV RBC AUTO: 88 FL (ref 79–97)
METHADONE UR QL SCN: NEGATIVE NG/ML
MICRO URNS: ABNORMAL
MONOCYTES # BLD AUTO: 0.6 X10E3/UL (ref 0.1–0.9)
MONOCYTES NFR BLD AUTO: 7 %
NEUTROPHILS # BLD AUTO: 6.3 X10E3/UL (ref 1.4–7)
NEUTROPHILS NFR BLD AUTO: 70 %
NITRITE UR QL STRIP: POSITIVE
OPIATES UR QL SCN: NEGATIVE NG/ML
OXYCODONE+OXYMORPHONE UR QL SCN: NEGATIVE NG/ML
PCP UR QL: NEGATIVE NG/ML
PH UR STRIP: 7 [PH] (ref 5–7.5)
PH UR: 6.6 [PH] (ref 4.5–8.9)
PLATELET # BLD AUTO: 242 X10E3/UL (ref 150–450)
PROPOXYPH UR QL SCN: NEGATIVE NG/ML
PROT UR QL STRIP: NEGATIVE
RBC # BLD AUTO: 4.8 X10E6/UL (ref 3.77–5.28)
RBC #/AREA URNS HPF: NORMAL /HPF (ref 0–2)
RH BLD: POSITIVE
RPR SER QL: NON REACTIVE
RUBV IGG SERPL IA-ACNC: 2.24 INDEX
SP GR UR STRIP: 1.01 (ref 1–1.03)
UROBILINOGEN UR STRIP-MCNC: 0.2 MG/DL (ref 0.2–1)
WBC # BLD AUTO: 9.1 X10E3/UL (ref 3.4–10.8)
WBC #/AREA URNS HPF: NORMAL /HPF (ref 0–5)

## 2025-03-27 LAB
C TRACH RRNA SPEC QL NAA+PROBE: NEGATIVE
N GONORRHOEA RRNA SPEC QL NAA+PROBE: NEGATIVE

## 2025-03-28 LAB
BACTERIA UR CULT: ABNORMAL
BACTERIA UR CULT: ABNORMAL
OTHER ANTIBIOTIC SUSC ISLT: ABNORMAL

## 2025-03-28 RX ORDER — NITROFURANTOIN 25; 75 MG/1; MG/1
100 CAPSULE ORAL 2 TIMES DAILY
Qty: 14 CAPSULE | Refills: 0 | Status: SHIPPED | OUTPATIENT
Start: 2025-03-28 | End: 2025-04-04

## 2025-04-03 ENCOUNTER — LAB (OUTPATIENT)
Dept: OBSTETRICS AND GYNECOLOGY | Facility: CLINIC | Age: 35
End: 2025-04-03
Payer: COMMERCIAL

## 2025-04-03 DIAGNOSIS — Z34.90 PRENATAL CARE, ANTEPARTUM, UNSPECIFIED GRAVIDITY: Primary | ICD-10-CM

## 2025-04-09 LAB
5P15 DELETION (CRI-DU-CHAT): NOT DETECTED
CFDNA.FET/CFDNA.TOTAL SFR FETUS: NORMAL %
CITATION REF LAB TEST: NORMAL
FET 13+18+21+X+Y ANEUP PLAS.CFDNA: NEGATIVE
FET 1P36 DEL RISK WBC.DNA+CFDNA QL: NOT DETECTED
FET 22Q11.2 DEL RISK WBC.DNA+CFDNA QL: NOT DETECTED
FET CHR 11Q23 DEL PLAS.CFDNA QL: NOT DETECTED
FET CHR 15Q11 DEL PLAS.CFDNA QL: NOT DETECTED
FET CHR 21 TS PLAS.CFDNA QL: NEGATIVE
FET CHR 4P16 DEL PLAS.CFDNA QL: NOT DETECTED
FET CHR 8Q24 DEL PLAS.CFDNA QL: NOT DETECTED
FET MS X RISK WBC.DNA+CFDNA QL: NOT DETECTED
FET SEX PLAS.CFDNA DOSAGE CFDNA: NORMAL
FET TS 13 RISK PLAS.CFDNA QL: NEGATIVE
FET TS 18 RISK WBC.DNA+CFDNA QL: NEGATIVE
FET X + Y ANEUP RISK PLAS.CFDNA SEQ-IMP: NOT DETECTED
GA EST FROM CONCEPTION DATE: NORMAL D
GESTATIONAL AGE > 9:: YES
LAB DIRECTOR NAME PROVIDER: NORMAL
LAB DIRECTOR NAME PROVIDER: NORMAL
LABORATORY COMMENT REPORT: NORMAL
LIMITATIONS OF THE TEST: NORMAL
NEGATIVE PREDICTIVE VALUE: NORMAL
PERFORMANCE CHARACTERISTICS: NORMAL
POSITIVE PREDICTIVE VALUE: NORMAL
REF LAB TEST METHOD: NORMAL
SERVICE CMNT-IMP: NORMAL
TEST PERFORMANCE INFO SPEC: NORMAL
TRIOSOMY 16: NOT DETECTED
TRISOMY 22: NOT DETECTED

## 2025-04-09 RX ORDER — PROMETHAZINE HYDROCHLORIDE 12.5 MG/1
12.5 TABLET ORAL EVERY 6 HOURS PRN
Qty: 30 TABLET | Refills: 0 | Status: SHIPPED | OUTPATIENT
Start: 2025-04-09

## 2025-04-22 ENCOUNTER — ROUTINE PRENATAL (OUTPATIENT)
Dept: OBSTETRICS AND GYNECOLOGY | Facility: CLINIC | Age: 35
End: 2025-04-22
Payer: COMMERCIAL

## 2025-04-22 VITALS — DIASTOLIC BLOOD PRESSURE: 72 MMHG | WEIGHT: 242.8 LBS | BODY MASS INDEX: 36.92 KG/M2 | SYSTOLIC BLOOD PRESSURE: 110 MMHG

## 2025-04-22 DIAGNOSIS — Z34.91 FIRST TRIMESTER PREGNANCY: Primary | ICD-10-CM

## 2025-04-22 DIAGNOSIS — Z98.891 PREVIOUS CESAREAN SECTION: ICD-10-CM

## 2025-04-22 DIAGNOSIS — Z98.890 H/O LEEP: ICD-10-CM

## 2025-04-22 LAB
GLUCOSE UR STRIP-MCNC: NEGATIVE MG/DL
PROT UR STRIP-MCNC: NEGATIVE MG/DL

## 2025-04-22 PROCEDURE — 0502F SUBSEQUENT PRENATAL CARE: CPT | Performed by: OBSTETRICS & GYNECOLOGY

## 2025-04-22 NOTE — PROGRESS NOTES
OB FOLLOW UP  CC- Here for care of pregnancy        Char Villatoro is a 34 y.o.  12w4d patient being seen today for her obstetrical follow up visit. Patient reports no complaints.     Her prenatal care is complicated by (and status) :   Patient Active Problem List   Diagnosis    Recurrent cold sores    H/O LEEP    Previous  section       Genetic testing?: already completed and was normal.  NOB labs reviewed  Ultrasound Today: No    ROS -   Patient Denies: leaking of fluid, vaginal bleeding, dysuria, excessive vomiting, and more than 6 contractions per hour  Fetal Movement: absent  Other than what is documented in the HPI, all other systems reviewed and are negative.     The additional following portions of the patient's history were reviewed and updated as appropriate: allergies and current medications.    I have reviewed and agree with the HPI, ROS, and historical information as entered above. Eunice Hernandez MD          /72   Wt 110 kg (242 lb 12.8 oz)   LMP 2025 (Exact Date)   BMI 36.92 kg/m²         EXAM:     Prenatal Vitals  BP: 110/72  Weight: 110 kg (242 lb 12.8 oz)              Urine Glucose Read-only: Negative  Urine Protein Read-only: Negative       Assessment and Plan    Problem List Items Addressed This Visit          Genitourinary and Reproductive     H/O LEEP    Overview   [ ] Cervical length at 16 and 20 weeks             Gravid and     Previous  section     Other Visit Diagnoses         First trimester pregnancy    -  Primary    Relevant Orders    POC Urinalysis Dipstick (Completed)            Pregnancy at 12w4d  Labs reviewed from New OB Visit.  Counseled on genetic testing, carrier status and option for NT screen  Activity and Exercise discussed.  Patient is on Prenatal vitamins  Return in about 4 weeks (around 2025), or cervical length, for US with Next Visit.    Eunice Hernandez MD  2025

## 2025-05-19 DIAGNOSIS — Z34.90 PRENATAL CARE, ANTEPARTUM, UNSPECIFIED GRAVIDITY: ICD-10-CM

## 2025-05-19 DIAGNOSIS — Z98.890 H/O LEEP: Primary | ICD-10-CM

## 2025-05-20 ENCOUNTER — ROUTINE PRENATAL (OUTPATIENT)
Dept: OBSTETRICS AND GYNECOLOGY | Facility: CLINIC | Age: 35
End: 2025-05-20
Payer: COMMERCIAL

## 2025-05-20 VITALS — SYSTOLIC BLOOD PRESSURE: 112 MMHG | WEIGHT: 246 LBS | DIASTOLIC BLOOD PRESSURE: 62 MMHG | BODY MASS INDEX: 37.4 KG/M2

## 2025-05-20 DIAGNOSIS — Z98.891 PREVIOUS CESAREAN SECTION: ICD-10-CM

## 2025-05-20 DIAGNOSIS — Z34.82 PRENATAL CARE, SUBSEQUENT PREGNANCY IN SECOND TRIMESTER: Primary | ICD-10-CM

## 2025-05-20 DIAGNOSIS — O99.210 OBESITY IN PREGNANCY, ANTEPARTUM: ICD-10-CM

## 2025-05-20 DIAGNOSIS — O23.42 URINARY TRACT INFECTION IN MOTHER DURING SECOND TRIMESTER OF PREGNANCY: ICD-10-CM

## 2025-05-20 DIAGNOSIS — Z98.890 H/O LEEP: ICD-10-CM

## 2025-05-20 DIAGNOSIS — Z36.89 ENCOUNTER FOR FETAL ANATOMIC SURVEY: ICD-10-CM

## 2025-05-20 LAB
GLUCOSE UR STRIP-MCNC: NEGATIVE MG/DL
PROT UR STRIP-MCNC: NEGATIVE MG/DL

## 2025-05-20 NOTE — PROGRESS NOTES
OB FOLLOW UP  CC- Here for care of pregnancy        Char Villatoro is a 34 y.o.  16w4d patient being seen today for her obstetrical follow up visit. Patient reports no complaints    KYARA urine culture today for UTI at initial prenatal.     Her prenatal care is complicated by (and status) : see below.  Patient Active Problem List   Diagnosis    Recurrent cold sores    H/O LEEP    Previous  section    Obesity in pregnancy, antepartum       Ultrasound Today: Yes. SIUP measuring 16w4d with FHR: 153bpm. CL: 44.0MM. AF: normal.     AFP: declines    ROS -   Patient Denies: leaking of fluid, vaginal bleeding, dysuria, excessive vomiting, and more than 6 contractions per hour  Other than what is documented in the HPI, all other systems reviewed and are negative.       The additional following portions of the patient's history were reviewed and updated as appropriate: allergies and current medications.      I have reviewed and agree with the HPI, ROS, and historical information as entered above. Eunice Hernandez MD          EXAM:     Prenatal Vitals  BP: 112/62  Weight: 112 kg (246 lb)   Fetal Heart Rate: 153/US         Urine Glucose Read-only: Negative  Urine Protein Read-only: Negative           Assessment and Plan    Problem List Items Addressed This Visit          Genitourinary and Reproductive     H/O LEEP    Overview   [ ] Cervical length at 16 and 20 weeks          Relevant Orders    US Ob 14 + Weeks Single or First Gestation       Gravid and     Previous  section    Relevant Orders    US Ob 14 + Weeks Single or First Gestation    Obesity in pregnancy, antepartum    Relevant Orders    US Ob 14 + Weeks Single or First Gestation     Other Visit Diagnoses         Prenatal care, subsequent pregnancy in second trimester    -  Primary    Relevant Orders    POC Urinalysis Dipstick (Completed)    US Ob 14 + Weeks Single or First Gestation      Encounter for fetal anatomic survey         Relevant Orders    US Ob 14 + Weeks Single or First Gestation            Pregnancy at 16w4d  Fetal status reassuring.   Counseled on MSAFP alone in relation to OTD and placental issues.    Anatomy scan next visit.   Activity and Exercise discussed.  Patient is on Prenatal vitamins  Return in about 4 weeks (around 6/17/2025) for Anatomy scan.    Eunice Hernandez MD  05/20/2025

## 2025-05-23 LAB
BACTERIA UR CULT: NORMAL
BACTERIA UR CULT: NORMAL

## 2025-06-17 ENCOUNTER — ROUTINE PRENATAL (OUTPATIENT)
Dept: OBSTETRICS AND GYNECOLOGY | Facility: CLINIC | Age: 35
End: 2025-06-17
Payer: COMMERCIAL

## 2025-06-17 VITALS — SYSTOLIC BLOOD PRESSURE: 102 MMHG | DIASTOLIC BLOOD PRESSURE: 62 MMHG | BODY MASS INDEX: 38.01 KG/M2 | WEIGHT: 250 LBS

## 2025-06-17 DIAGNOSIS — Z34.90 PRENATAL CARE, ANTEPARTUM, UNSPECIFIED GRAVIDITY: Primary | ICD-10-CM

## 2025-06-17 DIAGNOSIS — O99.210 OBESITY IN PREGNANCY, ANTEPARTUM: ICD-10-CM

## 2025-06-17 DIAGNOSIS — Z98.891 PREVIOUS CESAREAN SECTION: ICD-10-CM

## 2025-06-17 DIAGNOSIS — Z98.890 H/O LEEP: ICD-10-CM

## 2025-06-17 DIAGNOSIS — Z36.2 ENCOUNTER FOR FOLLOW-UP ULTRASOUND OF FETAL ANATOMY: ICD-10-CM

## 2025-06-17 LAB
GLUCOSE UR STRIP-MCNC: NEGATIVE MG/DL
PROT UR STRIP-MCNC: NEGATIVE MG/DL

## 2025-06-17 RX ORDER — LANOLIN ALCOHOL/MO/W.PET/CERES
50 CREAM (GRAM) TOPICAL DAILY
COMMUNITY

## 2025-06-17 NOTE — PROGRESS NOTES
OB FOLLOW UP  CC- Here for care of pregnancy        Char Villatoro is a 34 y.o.  20w4d patient being seen today for her obstetrical follow up visit. Patient reports no complaints.     Her prenatal care is complicated by (and status) : see below.  Patient Active Problem List   Diagnosis    Recurrent cold sores    H/O LEEP    Previous  section    Obesity in pregnancy, antepartum       Ultrasound Today: Yes  AFP was declined.    ROS -     Patient Denies: leaking of fluid, vaginal bleeding, dysuria, excessive vomiting, and more than 6 contractions per hour  Fetal Movement : Yes  Other than what is documented in the HPI, all other systems reviewed and are negative.       The additional following portions of the patient's history were reviewed and updated as appropriate: allergies, current medications, and problem list.      I have reviewed and agree with the HPI, ROS, and historical information as entered above. Eunice Hernandez MD      /62   Wt 113 kg (250 lb)   LMP 2025 (Exact Date)   BMI 38.01 kg/m²       EXAM:     Prenatal Vitals  BP: 102/62  Weight: 113 kg (250 lb)   Fetal Heart Rate: 151/US          Urine Glucose Read-only: Negative  Urine Protein Read-only: Negative       Assessment and Plan    Problem List Items Addressed This Visit          Genitourinary and Reproductive     H/O LEEP    Overview   [x] Cervical length at 16 and 20 weeks             Gravid and     Previous  section    Relevant Orders    US Ob Follow Up Transabdominal Approach    POC Urinalysis Dipstick (Completed)    Obesity in pregnancy, antepartum    Relevant Orders    US Ob Follow Up Transabdominal Approach    POC Urinalysis Dipstick (Completed)     Other Visit Diagnoses         Prenatal care, antepartum, unspecified     -  Primary    Relevant Orders    US Ob Follow Up Transabdominal Approach    POC Urinalysis Dipstick (Completed)      Encounter for follow-up ultrasound of fetal  anatomy        Relevant Orders    US Ob Follow Up Transabdominal Approach            Pregnancy at 20w4d  Anatomy scan today is incomplete, follow up in 4 weeks for additional views. Anatomy that was visualized was within normal limits.  Fetal status reassuring.   Activity and Exercise discussed.  Patient is on Prenatal vitamins  Return in about 4 weeks (around 7/15/2025) for Ultrasound, US with Next Visit, One hour glucola.    Eunice Hernandez MD  06/17/2025

## 2025-07-15 ENCOUNTER — ROUTINE PRENATAL (OUTPATIENT)
Dept: OBSTETRICS AND GYNECOLOGY | Facility: CLINIC | Age: 35
End: 2025-07-15
Payer: COMMERCIAL

## 2025-07-15 VITALS — SYSTOLIC BLOOD PRESSURE: 110 MMHG | WEIGHT: 252.4 LBS | DIASTOLIC BLOOD PRESSURE: 64 MMHG | BODY MASS INDEX: 38.38 KG/M2

## 2025-07-15 DIAGNOSIS — Z98.890 H/O LEEP: ICD-10-CM

## 2025-07-15 DIAGNOSIS — Z34.90 PRENATAL CARE, ANTEPARTUM, UNSPECIFIED GRAVIDITY: Primary | ICD-10-CM

## 2025-07-15 DIAGNOSIS — O99.210 OBESITY IN PREGNANCY, ANTEPARTUM: ICD-10-CM

## 2025-07-15 DIAGNOSIS — Z98.891 PREVIOUS CESAREAN SECTION: ICD-10-CM

## 2025-07-15 LAB
GLUCOSE 1H P 50 G GLC PO SERPL-MCNC: 102 MG/DL (ref 65–139)
GLUCOSE UR STRIP-MCNC: NEGATIVE MG/DL
PROT UR STRIP-MCNC: NEGATIVE MG/DL

## 2025-07-15 NOTE — PROGRESS NOTES
OB FOLLOW UP  CC- Here for care of pregnancy        Char Villatoro is a 34 y.o.  24w4d patient being seen today for her obstetrical follow up. Patient reports no complaints.     Patient undergoing Glucola testing today. She is due for her testing at 1005.     MBT: A+  Rhogam: is not indicated.  28 week packet: reviewed with patient , counseled on fetal movement , pediatrician list reviewed, breast pump discussed, and childbirth classes reviewed  TDAP: will receive next visit  Ultrasound Today: Yes  Does patient need tubal consent or  consent signed? no    Her prenatal care is complicated by (and status) : see below.  Patient Active Problem List   Diagnosis    Recurrent cold sores    H/O LEEP    Previous  section    Obesity in pregnancy, antepartum         ROS -   Patient Denies: Loss of Fluid, Vaginal Spotting, Vision Changes, Headaches, Nausea , Vomiting , Contractions, Epigastric pain, and skin itching  Fetal Movement : normal  Other than what is documented in the HPI, all other systems reviewed and are negative.     The additional following portions of the patient's history were reviewed and updated as appropriate: allergies, current medications, and problem list.    I have reviewed and agree with the HPI, ROS, and historical information as entered above. Eunice Hernandez MD      /64   Wt 114 kg (252 lb 6.4 oz)   LMP 2025 (Exact Date)   BMI 38.38 kg/m²         EXAM:     Prenatal Vitals  BP: 110/64  Weight: 114 kg (252 lb 6.4 oz)   Fetal Heart Rate: 150/US               Urine Glucose Read-only: Negative  Urine Protein Read-only: Negative         Assessment and Plan    Problem List Items Addressed This Visit          Genitourinary and Reproductive     H/O LEEP    Overview   [x] Cervical length at 16 and 20 weeks             Gravid and     Previous  section    Overview   Desires repeat, scheduled R C/S 10/24/25 at 0900         Relevant Orders    POC  Urinalysis Dipstick (Completed)    Obesity in pregnancy, antepartum    Relevant Orders    POC Urinalysis Dipstick (Completed)     Other Visit Diagnoses         Prenatal care, antepartum, unspecified     -  Primary    Relevant Orders    POC Urinalysis Dipstick (Completed)    CBC (No Diff)    Gestational Screen 1 Hr ([facility])    Antibody Screen    RPR, Rfx Qn RPR / Confirm TP            Pregnancy at 24w4d  One hour today.  U/S complete and reviewed.  Fetal movement/PTL or Labor precautions  Activity and Exercise discussed.  Return in about 4 weeks (around 2025).        Eunice Hernandez MD  07/15/2025

## 2025-07-16 LAB
BLD GP AB SCN SERPL QL: NEGATIVE
ERYTHROCYTE [DISTWIDTH] IN BLOOD BY AUTOMATED COUNT: 12.2 % (ref 12.3–15.4)
HCT VFR BLD AUTO: 35 % (ref 34–46.6)
HGB BLD-MCNC: 12 G/DL (ref 12–15.9)
MCH RBC QN AUTO: 31 PG (ref 26.6–33)
MCHC RBC AUTO-ENTMCNC: 34.3 G/DL (ref 31.5–35.7)
MCV RBC AUTO: 90.4 FL (ref 79–97)
OBSERVATION IMP: NORMAL
PLATELET # BLD AUTO: 193 10*3/MM3 (ref 140–450)
RBC # BLD AUTO: 3.87 10*6/MM3 (ref 3.77–5.28)
RPR SER QL: NON REACTIVE
WBC # BLD AUTO: 9.9 10*3/MM3 (ref 3.4–10.8)

## 2025-07-21 ENCOUNTER — HOSPITAL ENCOUNTER (INPATIENT)
Facility: HOSPITAL | Age: 35
LOS: 2 days | Discharge: HOME | End: 2025-07-23
Attending: OBSTETRICS & GYNECOLOGY | Admitting: OBSTETRICS & GYNECOLOGY
Payer: COMMERCIAL

## 2025-07-21 ENCOUNTER — APPOINTMENT (OUTPATIENT)
Dept: CT IMAGING | Facility: HOSPITAL | Age: 35
End: 2025-07-21
Payer: COMMERCIAL

## 2025-07-21 ENCOUNTER — TELEPHONE (OUTPATIENT)
Dept: OBSTETRICS AND GYNECOLOGY | Facility: CLINIC | Age: 35
End: 2025-07-21
Payer: COMMERCIAL

## 2025-07-21 DIAGNOSIS — G89.18 POST-OP PAIN: ICD-10-CM

## 2025-07-21 DIAGNOSIS — K35.30 ACUTE APPENDICITIS WITH LOCALIZED PERITONITIS, WITHOUT PERFORATION, ABSCESS, OR GANGRENE: Primary | ICD-10-CM

## 2025-07-21 DIAGNOSIS — K37 APPENDICITIS: ICD-10-CM

## 2025-07-21 PROBLEM — R10.31 RLQ ABDOMINAL PAIN: Status: ACTIVE | Noted: 2025-07-21

## 2025-07-21 LAB
ABO GROUP BLD: NORMAL
BACTERIA UR QL AUTO: ABNORMAL /HPF
BASOPHILS # BLD AUTO: 0.01 10*3/MM3 (ref 0–0.2)
BASOPHILS NFR BLD AUTO: 0.1 % (ref 0–1.5)
BILIRUB UR QL STRIP: NEGATIVE
BLD GP AB SCN SERPL QL: NEGATIVE
CLARITY UR: ABNORMAL
COLOR UR: ABNORMAL
DEPRECATED RDW RBC AUTO: 42.1 FL (ref 37–54)
EOSINOPHIL # BLD AUTO: 0.02 10*3/MM3 (ref 0–0.4)
EOSINOPHIL NFR BLD AUTO: 0.1 % (ref 0.3–6.2)
ERYTHROCYTE [DISTWIDTH] IN BLOOD BY AUTOMATED COUNT: 13 % (ref 12.3–15.4)
GLUCOSE UR STRIP-MCNC: NEGATIVE MG/DL
HCT VFR BLD AUTO: 35 % (ref 34–46.6)
HGB BLD-MCNC: 11.5 G/DL (ref 12–15.9)
HGB UR QL STRIP.AUTO: NEGATIVE
HYALINE CASTS UR QL AUTO: ABNORMAL /LPF
IMM GRANULOCYTES # BLD AUTO: 0.06 10*3/MM3 (ref 0–0.05)
IMM GRANULOCYTES NFR BLD AUTO: 0.4 % (ref 0–0.5)
KETONES UR QL STRIP: ABNORMAL
LEUKOCYTE ESTERASE UR QL STRIP.AUTO: ABNORMAL
LYMPHOCYTES # BLD AUTO: 1.61 10*3/MM3 (ref 0.7–3.1)
LYMPHOCYTES NFR BLD AUTO: 11.6 % (ref 19.6–45.3)
MCH RBC QN AUTO: 29.3 PG (ref 26.6–33)
MCHC RBC AUTO-ENTMCNC: 32.9 G/DL (ref 31.5–35.7)
MCV RBC AUTO: 89.1 FL (ref 79–97)
MONOCYTES # BLD AUTO: 0.96 10*3/MM3 (ref 0.1–0.9)
MONOCYTES NFR BLD AUTO: 6.9 % (ref 5–12)
NEUTROPHILS NFR BLD AUTO: 11.21 10*3/MM3 (ref 1.7–7)
NEUTROPHILS NFR BLD AUTO: 80.9 % (ref 42.7–76)
NITRITE UR QL STRIP: POSITIVE
NRBC BLD AUTO-RTO: 0 /100 WBC (ref 0–0.2)
PH UR STRIP.AUTO: 6.5 [PH] (ref 5–8)
PLATELET # BLD AUTO: 189 10*3/MM3 (ref 140–450)
PMV BLD AUTO: 10.3 FL (ref 6–12)
PROT UR QL STRIP: ABNORMAL
RBC # BLD AUTO: 3.93 10*6/MM3 (ref 3.77–5.28)
RBC # UR STRIP: ABNORMAL /HPF
REF LAB TEST METHOD: ABNORMAL
RH BLD: POSITIVE
SP GR UR STRIP: >1.03 (ref 1–1.03)
SQUAMOUS #/AREA URNS HPF: ABNORMAL /HPF
T&S EXPIRATION DATE: NORMAL
UROBILINOGEN UR QL STRIP: ABNORMAL
WBC # UR STRIP: ABNORMAL /HPF
WBC NRBC COR # BLD AUTO: 13.87 10*3/MM3 (ref 3.4–10.8)

## 2025-07-21 PROCEDURE — 25010000002 HYDROMORPHONE 1 MG/ML SOLUTION: Performed by: OBSTETRICS & GYNECOLOGY

## 2025-07-21 PROCEDURE — 59025 FETAL NON-STRESS TEST: CPT

## 2025-07-21 PROCEDURE — 74176 CT ABD & PELVIS W/O CONTRAST: CPT

## 2025-07-21 PROCEDURE — 86850 RBC ANTIBODY SCREEN: CPT | Performed by: OBSTETRICS & GYNECOLOGY

## 2025-07-21 PROCEDURE — 86900 BLOOD TYPING SEROLOGIC ABO: CPT | Performed by: OBSTETRICS & GYNECOLOGY

## 2025-07-21 PROCEDURE — 25010000002 CEFTRIAXONE PER 250 MG: Performed by: OBSTETRICS & GYNECOLOGY

## 2025-07-21 PROCEDURE — 81001 URINALYSIS AUTO W/SCOPE: CPT | Performed by: OBSTETRICS & GYNECOLOGY

## 2025-07-21 PROCEDURE — 63710000001 ONDANSETRON ODT 4 MG TABLET DISPERSIBLE: Performed by: OBSTETRICS & GYNECOLOGY

## 2025-07-21 PROCEDURE — 86901 BLOOD TYPING SEROLOGIC RH(D): CPT | Performed by: OBSTETRICS & GYNECOLOGY

## 2025-07-21 PROCEDURE — 85025 COMPLETE CBC W/AUTO DIFF WBC: CPT | Performed by: OBSTETRICS & GYNECOLOGY

## 2025-07-21 PROCEDURE — 99222 1ST HOSP IP/OBS MODERATE 55: CPT | Performed by: OBSTETRICS & GYNECOLOGY

## 2025-07-21 PROCEDURE — 25010000002 FAMOTIDINE 10 MG/ML SOLUTION: Performed by: STUDENT IN AN ORGANIZED HEALTH CARE EDUCATION/TRAINING PROGRAM

## 2025-07-21 PROCEDURE — 99285 EMERGENCY DEPT VISIT HI MDM: CPT | Performed by: OBSTETRICS & GYNECOLOGY

## 2025-07-21 RX ORDER — ONDANSETRON 2 MG/ML
4 INJECTION INTRAMUSCULAR; INTRAVENOUS EVERY 6 HOURS PRN
Status: DISCONTINUED | OUTPATIENT
Start: 2025-07-21 | End: 2025-07-23 | Stop reason: HOSPADM

## 2025-07-21 RX ORDER — HYDROMORPHONE HYDROCHLORIDE 1 MG/ML
0.5 INJECTION, SOLUTION INTRAMUSCULAR; INTRAVENOUS; SUBCUTANEOUS
Refills: 0 | Status: DISCONTINUED | OUTPATIENT
Start: 2025-07-21 | End: 2025-07-21

## 2025-07-21 RX ORDER — LIDOCAINE HYDROCHLORIDE 10 MG/ML
0.5 INJECTION, SOLUTION EPIDURAL; INFILTRATION; INTRACAUDAL; PERINEURAL ONCE AS NEEDED
Status: DISCONTINUED | OUTPATIENT
Start: 2025-07-21 | End: 2025-07-23 | Stop reason: HOSPADM

## 2025-07-21 RX ORDER — DOCUSATE SODIUM 100 MG/1
100 CAPSULE, LIQUID FILLED ORAL 2 TIMES DAILY PRN
Status: DISCONTINUED | OUTPATIENT
Start: 2025-07-21 | End: 2025-07-23 | Stop reason: HOSPADM

## 2025-07-21 RX ORDER — BISACODYL 10 MG
10 SUPPOSITORY, RECTAL RECTAL DAILY PRN
Status: DISCONTINUED | OUTPATIENT
Start: 2025-07-21 | End: 2025-07-23 | Stop reason: HOSPADM

## 2025-07-21 RX ORDER — ACETAMINOPHEN 325 MG/1
650 TABLET ORAL EVERY 4 HOURS PRN
Status: DISCONTINUED | OUTPATIENT
Start: 2025-07-21 | End: 2025-07-23 | Stop reason: HOSPADM

## 2025-07-21 RX ORDER — SODIUM CHLORIDE 9 MG/ML
40 INJECTION, SOLUTION INTRAVENOUS AS NEEDED
Status: DISCONTINUED | OUTPATIENT
Start: 2025-07-21 | End: 2025-07-23 | Stop reason: HOSPADM

## 2025-07-21 RX ORDER — SODIUM CHLORIDE 0.9 % (FLUSH) 0.9 %
10 SYRINGE (ML) INJECTION EVERY 12 HOURS SCHEDULED
Status: DISCONTINUED | OUTPATIENT
Start: 2025-07-21 | End: 2025-07-23 | Stop reason: HOSPADM

## 2025-07-21 RX ORDER — ONDANSETRON 4 MG/1
4 TABLET, ORALLY DISINTEGRATING ORAL EVERY 6 HOURS PRN
Status: DISCONTINUED | OUTPATIENT
Start: 2025-07-21 | End: 2025-07-23 | Stop reason: HOSPADM

## 2025-07-21 RX ORDER — SODIUM CHLORIDE 0.9 % (FLUSH) 0.9 %
10 SYRINGE (ML) INJECTION AS NEEDED
Status: DISCONTINUED | OUTPATIENT
Start: 2025-07-21 | End: 2025-07-23 | Stop reason: HOSPADM

## 2025-07-21 RX ORDER — FAMOTIDINE 10 MG/ML
20 INJECTION, SOLUTION INTRAVENOUS EVERY 12 HOURS SCHEDULED
Status: DISCONTINUED | OUTPATIENT
Start: 2025-07-21 | End: 2025-07-23 | Stop reason: HOSPADM

## 2025-07-21 RX ORDER — CYCLOBENZAPRINE HCL 10 MG
5 TABLET ORAL 3 TIMES DAILY PRN
Status: DISCONTINUED | OUTPATIENT
Start: 2025-07-21 | End: 2025-07-23 | Stop reason: HOSPADM

## 2025-07-21 RX ADMIN — ACETAMINOPHEN 650 MG: 325 TABLET ORAL at 21:52

## 2025-07-21 RX ADMIN — HYDROMORPHONE HYDROCHLORIDE 1 MG: 1 INJECTION, SOLUTION INTRAMUSCULAR; INTRAVENOUS; SUBCUTANEOUS at 23:24

## 2025-07-21 RX ADMIN — Medication 10 ML: at 20:59

## 2025-07-21 RX ADMIN — SODIUM CHLORIDE 1000 ML: 4.5 INJECTION, SOLUTION INTRAVENOUS at 18:32

## 2025-07-21 RX ADMIN — FAMOTIDINE 20 MG: 10 INJECTION INTRAVENOUS at 21:52

## 2025-07-21 RX ADMIN — CEFTRIAXONE SODIUM 1000 MG: 1 INJECTION, POWDER, FOR SOLUTION INTRAMUSCULAR; INTRAVENOUS at 18:30

## 2025-07-21 RX ADMIN — HYDROMORPHONE HYDROCHLORIDE 1 MG: 1 INJECTION, SOLUTION INTRAMUSCULAR; INTRAVENOUS; SUBCUTANEOUS at 20:58

## 2025-07-21 RX ADMIN — ONDANSETRON 4 MG: 4 TABLET, ORALLY DISINTEGRATING ORAL at 17:09

## 2025-07-21 NOTE — TELEPHONE ENCOUNTER
Caller: Char Villatoro    Relationship: Self    Best call back number: [phone]    What was the call regarding: PT STATED SHE HAS BEEN VERY SICKA ND VOMITING SINCE 4AM. ALSO EXPERIENCING DEEP CRAMPING ON HER RIGHT SIDE UNDER HER BUMP.     PT UNSURE IF SHE NEEDED TO CONTACT HER OB OR PCP.

## 2025-07-21 NOTE — H&P
"[facility]  Obstetric History and Physical    Referring Provider: Eunice Hernandez MD      Chief Complaint   Patient presents with    Back Pain    Pelvic Pain       HPI:    Patient is a 34 y.o. female  currently at 25w3d, who presents with a three day history of RLQ abdominal pain and right sided flank pain.  She say she has no appetite  and has not kept anything down since last night.   She say she does not feel nausea nor has she vomited just no desire to eat.   Denies fever or chills.   Her pain had been intermittent, but today she says it is more of a constant pain.  She say sit feels like a pulling especially when she goes from lying down or sitting down to standing.  She denies feeling contractions or cramping.    +FM.    Denies vaginal leaking and bleeding     After IV hydration, her pain has not improved and is actually worse and brought her to tears.             Prenatal Information:   Maternal Prenatal Labs  Blood Type No results found for: \"ABO\"   Rh Status No results found for: \"RH\"   Antibody Screen No results found for: \"ABSCRN\"   Gonnorhea No results found for: \"GCCX\"   Chlamydia No results found for: \"CLAMYDCU\"   RPR No results found for: \"RPR\"   Syphilis Antibody No results found for: \"SYPHILIS\"   Rubella No results found for: \"RUBELLAIGGIN\"   Hepatitis B Surface Antigen No results found for: \"HEPBSAG\"   HIV-1 Antibody No results found for: \"LABHIV1\"   Hepatitis C Antibody No results found for: \"HEPCAB\"   Rapid Urin Drug Screen No results found for: \"AMPMETHU\", \"BARBITSCNUR\", \"LABBENZSCN\", \"LABMETHSCN\", \"LABOPIASCN\", \"THCURSCR\", \"COCAINEUR\", \"AMPHETSCREEN\", \"PROPOXSCN\", \"BUPRENORSCNU\", \"METAMPSCNUR\", \"OXYCODONESCN\", \"TRICYCLICSCN\"   Group B Strep Culture No results found for: \"GBSANTIGEN\"           External Prenatal Results       Pregnancy Outside Results - Transcribed From Office Records - See Scanned Records For Details       Test Value Date Time    ABO  A  25 1139    Rh  Positive  " 25 1139    Antibody Screen  Negative  07/15/25 1005       Negative  25 1139    Varicella IgG       Rubella  2.24 index 25 1139    Hgb  12.0 g/dL 07/15/25 1005       13.9 g/dL 25 1139    Hct  35.0 % 07/15/25 1005       42.0 % 25 1139    HgB A1c        1h GTT  102 mg/dL 07/15/25 1005    3h GTT Fasting       3h GTT 1 hour       3h GTT 2 hour       3h GTT 3 hour        Gonorrhea (discrete)  Negative  25 1139    Chlamydia (discrete)  Negative  25 1139    RPR  Non Reactive  07/15/25 1005       Non Reactive  25 1139    Syphils cascade: TP-Ab (FTA)       TP-Ab       TP-Ab (EIA)       TPPA       HBsAg  Negative  25 1139    Herpes Simplex Virus PCR       Herpes Simplex VIrus Culture       HIV  Non Reactive  25 1139    Hep C RNA Quant PCR       Hep C Antibody  Non Reactive  25 1139    AFP       NIPT       Cystic Fibrosis ([place])       Cystic Fibroisis        Spinal Muscular atrophy       Fragile X       Group B Strep       GBS Susceptibility to Clindamycin       GBS Susceptibility to Erythromycin       Fetal Fibronectin       Genetic Testing, Maternal Blood                 Drug Screening       Test Value Date Time    Urine Drug Screen       Amphetamine Screen  Negative ng/mL 25 1139    Barbiturate Screen  Negative ng/mL 25 1139    Benzodiazepine Screen  Negative ng/mL 25 1139    Methadone Screen  Negative ng/mL 25 1139    Phencyclidine Screen  Negative ng/mL 25 1139    Opiates Screen       THC Screen       Cocaine Screen       Propoxyphene Screen  Negative ng/mL 25 1139    Buprenorphine Screen       Methamphetamine Screen       Oxycodone Screen       Tricyclic Antidepressants Screen                 Legend    ^: Historical                              Past OB History:       OB History    Para Term  AB Living   2 1 1 0 0 1   SAB IAB Ectopic Molar Multiple Live Births   0 0 0 0 0 1      # Outcome Date GA Lbr  Meir/2nd Weight Sex Type Anes PTL Lv   2 Current            1 Term 23 39w2d  4211 g (9 lb 4.5 oz) F CS-LTranv EPI N SUZE      Complications: Failure to Progress in First Stage      Name: BEN GREGORY      Apgar1: 8  Apgar5: 9       Past Medical History: Past Medical History:   Diagnosis Date    Abnormal Pap smear of cervix     Disease of thyroid gland     H/O cold sores       Past Surgical History Past Surgical History:   Procedure Laterality Date    CERVICAL BIOPSY  W/ LOOP ELECTRODE EXCISION  2018     SECTION N/A 3/31/2023    Procedure:  SECTION PRIMARY;  Surgeon: Yuridia Lu MD;  Location: [facility] LABOR DELIVERY;  Service: Obstetrics/Gynecology;  Laterality: N/A;    LEEP  2018    WISDOM TOOTH EXTRACTION  2019      Family History: Family History   Problem Relation Age of Onset    Breast cancer Maternal Aunt 43    Ovarian cancer Neg Hx     Uterine cancer Neg Hx     Colon cancer Neg Hx       Social History:  reports that she has never smoked. She has never used smokeless tobacco.   reports no history of alcohol use.   reports no history of drug use.                   General ROS Negative Findings:  All pertinent positives and negatives are addressed in the HPI        Vital Signs Range for the last 24 hours  Temperature: Temp:  [98.7 °F (37.1 °C)] 98.7 °F (37.1 °C)   Temp Source: Temp src: Oral   BP: BP: (111-117)/(66-72) 111/72   Pulse: Heart Rate:  [91] 91   Respirations: Resp:  [16] 16   SPO2: SpO2:  [95 %] 95 %   O2 Amount (l/min):     O2 Devices     Weight:       Physical Examination:   General:   alert, appears stated age, and cooperative            Chest: CTAB    Heart:   Regular rate    Abdominal  Soft, non-tender,  gravid uterus, mild RLQ pain tenderness with palpation .  No rebound tenderness.    Lower Extremities: +1   Back Mild right sided CVA tenderness                      Fetal Heart Rate Assessment   Method: Fetal HR Assessment Method: external   Beats/min: Fetal HR  (beats/min): 150   Baseline: Fetal HR Baseline: normal [id number]   Varibility: Fetal HR Variability: moderate (amplitude range 6 to 25 bpm)mod   Accels: Fetal HR Accelerations: greater than/equal to 15 bpm, lasting at least 15 secondsy   Decels: Fetal HR Decelerations: absentn   Tracing Category:  1     Uterine Assessment   Method: Method: external tocotransducer   Frequency (min):  None    Ctx Count in 10 min:     Duration:     Intensity: Contraction Intensity: no contractions   Intensity by IUPC:     Resting Tone: Uterine Resting Tone: soft by palpation   Resting Tone by IUPC:     [place] Units:       Laboratory Results:   Lab Results (last 24 hours)       Procedure Component Value Units Date/Time    CBC & Differential [[id number]]  (Abnormal) Collected: 07/21/25 1832    Specimen: Blood Updated: 07/21/25 1846    Narrative:      The following orders were created for panel order CBC & Differential.  Procedure                               Abnormality         Status                     ---------                               -----------         ------                     CBC Auto Differential[[id number]]        Abnormal            Final result                 Please view results for these tests on the individual orders.    CBC Auto Differential [[id number]]  (Abnormal) Collected: 07/21/25 1832    Specimen: Blood Updated: 07/21/25 1846     WBC 13.87 10*3/mm3      RBC 3.93 10*6/mm3      Hemoglobin 11.5 g/dL      Hematocrit 35.0 %      MCV 89.1 fL      MCH 29.3 pg      MCHC 32.9 g/dL      RDW 13.0 %      RDW-SD 42.1 fl      MPV 10.3 fL      Platelets 189 10*3/mm3      Neutrophil % 80.9 %      Lymphocyte % 11.6 %      Monocyte % 6.9 %      Eosinophil % 0.1 %      Basophil % 0.1 %      Immature Grans % 0.4 %      Neutrophils, Absolute 11.21 10*3/mm3      Lymphocytes, Absolute 1.61 10*3/mm3      Monocytes, Absolute 0.96 10*3/mm3      Eosinophils, Absolute 0.02 10*3/mm3      Basophils, Absolute 0.01 10*3/mm3      Immature  Grans, Absolute 0.06 10*3/mm3      nRBC 0.0 /100 WBC     Urinalysis With Microscopic If Indicated (No Culture) - Urine, Clean Catch [[id number]]  (Abnormal) Collected: 07/21/25 1713    Specimen: Urine, Clean Catch Updated: 07/21/25 1729     Color, UA Dark Yellow     Appearance, UA Cloudy     pH, UA 6.5     Specific Gravity, UA >1.030     Glucose, UA Negative     Ketones, UA >=160 mg/dL (4+)     Bilirubin, UA Negative     Blood, UA Negative     Protein, UA Trace     Leuk Esterase, UA Trace     Nitrite, UA Positive     Urobilinogen, UA 1.0 E.U./dL    Urinalysis, Microscopic Only - Urine, Clean Catch [[id number]]  (Abnormal) Collected: 07/21/25 1713    Specimen: Urine, Clean Catch Updated: 07/21/25 1729     RBC, UA 0-2 /HPF      WBC, UA 11-20 /HPF      Bacteria, UA 4+ /HPF      Squamous Epithelial Cells, UA 3-6 /HPF      Hyaline Casts, UA None Seen /LPF      Methodology Automated Microscopy          Radiology Review:   Imaging Results (Last 24 Hours)       ** No results found for the last 24 hours. **          Other Studies:      Assessment & Plan             SALLIE Course / Assessment  1.   All lab and imaging results listed above have been reviewed by me.  2.   Intrauterine pregnancy at 25w3d gestation with reactive fetal status.    3.   UTI  -   4.   Worsening RLQ pain    Plan:     Admit to APU  CT Abd/Pelvis   Dilaudid 1 mg Q 2 hr prn   NPO until CT read  TID 1 hour fetal monitoring   Continue IV fluids and Rocephin Q 24 hrs             Santana Diaz MD  7/21/2025  19:27 EDT

## 2025-07-21 NOTE — TELEPHONE ENCOUNTER
Patient of Dr. Hernandez;  @ 25w 3d. LOV 07/15/25; NOV 25.  Returned patient's call.   Reports onset @ 5 am of vomiting and lower back pain. Cramping in stomach and right lower abdomen.  Lower back pain has lessened.   Denies any nausea but has vomited 4 times this morning.   Denies any diarrhea, fever, body aches, or other symptoms.  Reports active fetal movement.  Denies any bleeding, leaking fluid, or contractions.   Has Phenergan at home.   States she has been able to retain some fluids this morning.  Discussed with STEFF Evangelista. Symptoms seem consistent with GI virus; try to stay hydrated. Can take Phenergan if symptoms worsen. Monitor fetal movement.   Informed patient. Advised to go to L&D triage if unable to keep anything down for 24 hours. Call with worsening symptoms or concerns. Patient v/u and agreed.

## 2025-07-22 ENCOUNTER — ANESTHESIA (OUTPATIENT)
Dept: PERIOP | Facility: HOSPITAL | Age: 35
End: 2025-07-22
Payer: COMMERCIAL

## 2025-07-22 ENCOUNTER — APPOINTMENT (OUTPATIENT)
Dept: MRI IMAGING | Facility: HOSPITAL | Age: 35
End: 2025-07-22
Payer: COMMERCIAL

## 2025-07-22 ENCOUNTER — ANESTHESIA EVENT (OUTPATIENT)
Dept: PERIOP | Facility: HOSPITAL | Age: 35
End: 2025-07-22
Payer: COMMERCIAL

## 2025-07-22 PROBLEM — K35.30 ACUTE APPENDICITIS WITH LOCALIZED PERITONITIS, WITHOUT PERFORATION, ABSCESS, OR GANGRENE: Status: ACTIVE | Noted: 2025-07-21

## 2025-07-22 LAB
BASOPHILS # BLD AUTO: 0.02 10*3/MM3 (ref 0–0.2)
BASOPHILS NFR BLD AUTO: 0.2 % (ref 0–1.5)
DEPRECATED RDW RBC AUTO: 41.9 FL (ref 37–54)
EOSINOPHIL # BLD AUTO: 0.02 10*3/MM3 (ref 0–0.4)
EOSINOPHIL NFR BLD AUTO: 0.2 % (ref 0.3–6.2)
ERYTHROCYTE [DISTWIDTH] IN BLOOD BY AUTOMATED COUNT: 12.9 % (ref 12.3–15.4)
HCT VFR BLD AUTO: 32.6 % (ref 34–46.6)
HGB BLD-MCNC: 10.9 G/DL (ref 12–15.9)
IMM GRANULOCYTES # BLD AUTO: 0.06 10*3/MM3 (ref 0–0.05)
IMM GRANULOCYTES NFR BLD AUTO: 0.5 % (ref 0–0.5)
LYMPHOCYTES # BLD AUTO: 1.33 10*3/MM3 (ref 0.7–3.1)
LYMPHOCYTES NFR BLD AUTO: 10.2 % (ref 19.6–45.3)
MCH RBC QN AUTO: 29.5 PG (ref 26.6–33)
MCHC RBC AUTO-ENTMCNC: 33.4 G/DL (ref 31.5–35.7)
MCV RBC AUTO: 88.1 FL (ref 79–97)
MONOCYTES # BLD AUTO: 1.02 10*3/MM3 (ref 0.1–0.9)
MONOCYTES NFR BLD AUTO: 7.8 % (ref 5–12)
NEUTROPHILS NFR BLD AUTO: 10.62 10*3/MM3 (ref 1.7–7)
NEUTROPHILS NFR BLD AUTO: 81.1 % (ref 42.7–76)
NRBC BLD AUTO-RTO: 0 /100 WBC (ref 0–0.2)
PLATELET # BLD AUTO: 174 10*3/MM3 (ref 140–450)
PMV BLD AUTO: 10.2 FL (ref 6–12)
RBC # BLD AUTO: 3.7 10*6/MM3 (ref 3.77–5.28)
WBC NRBC COR # BLD AUTO: 13.07 10*3/MM3 (ref 3.4–10.8)

## 2025-07-22 PROCEDURE — 25010000002 FENTANYL CITRATE (PF) 50 MCG/ML SOLUTION

## 2025-07-22 PROCEDURE — 88304 TISSUE EXAM BY PATHOLOGIST: CPT | Performed by: SURGERY

## 2025-07-22 PROCEDURE — 25810000003 SODIUM CHLORIDE PER 500 ML: Performed by: SURGERY

## 2025-07-22 PROCEDURE — 25010000002 FAMOTIDINE 10 MG/ML SOLUTION: Performed by: STUDENT IN AN ORGANIZED HEALTH CARE EDUCATION/TRAINING PROGRAM

## 2025-07-22 PROCEDURE — 25010000002 HYDROMORPHONE 1 MG/ML SOLUTION

## 2025-07-22 PROCEDURE — 25010000002 PROPOFOL 10 MG/ML EMULSION: Performed by: ANESTHESIOLOGY

## 2025-07-22 PROCEDURE — 25810000003 LACTATED RINGERS PER 1000 ML: Performed by: STUDENT IN AN ORGANIZED HEALTH CARE EDUCATION/TRAINING PROGRAM

## 2025-07-22 PROCEDURE — 0DTJ4ZZ RESECTION OF APPENDIX, PERCUTANEOUS ENDOSCOPIC APPROACH: ICD-10-PCS | Performed by: SURGERY

## 2025-07-22 PROCEDURE — 25010000002 ONDANSETRON PER 1 MG: Performed by: ANESTHESIOLOGY

## 2025-07-22 PROCEDURE — 25010000002 ONDANSETRON PER 1 MG: Performed by: STUDENT IN AN ORGANIZED HEALTH CARE EDUCATION/TRAINING PROGRAM

## 2025-07-22 PROCEDURE — 25010000002 FENTANYL CITRATE (PF) 100 MCG/2ML SOLUTION: Performed by: ANESTHESIOLOGY

## 2025-07-22 PROCEDURE — 25010000002 DROPERIDOL PER 5 MG

## 2025-07-22 PROCEDURE — 25010000002 FAMOTIDINE 10 MG/ML SOLUTION: Performed by: ANESTHESIOLOGY

## 2025-07-22 PROCEDURE — 25010000002 HYDROMORPHONE 1 MG/ML SOLUTION: Performed by: OBSTETRICS & GYNECOLOGY

## 2025-07-22 PROCEDURE — 74181 MRI ABDOMEN W/O CONTRAST: CPT

## 2025-07-22 PROCEDURE — 25010000002 SUGAMMADEX 200 MG/2ML SOLUTION: Performed by: ANESTHESIOLOGY

## 2025-07-22 PROCEDURE — 25010000002 LIDOCAINE PF 1% 1 % SOLUTION: Performed by: ANESTHESIOLOGY

## 2025-07-22 PROCEDURE — 99231 SBSQ HOSP IP/OBS SF/LOW 25: CPT | Performed by: OBSTETRICS & GYNECOLOGY

## 2025-07-22 PROCEDURE — 25010000002 PIPERACILLIN SOD-TAZOBACTAM PER 1 G: Performed by: SURGERY

## 2025-07-22 PROCEDURE — 25010000002 HYDROMORPHONE PER 4 MG: Performed by: SURGERY

## 2025-07-22 PROCEDURE — 25010000002 DEXAMETHASONE PER 1 MG: Performed by: ANESTHESIOLOGY

## 2025-07-22 PROCEDURE — 85025 COMPLETE CBC W/AUTO DIFF WBC: CPT | Performed by: OBSTETRICS & GYNECOLOGY

## 2025-07-22 PROCEDURE — 25810000003 LACTATED RINGERS PER 1000 ML: Performed by: ANESTHESIOLOGY

## 2025-07-22 DEVICE — ENDOPATH ECHELON ENDOSCOPIC LINEAR CUTTER RELOADS, BLUE, 60MM
Type: IMPLANTABLE DEVICE | Site: COLON | Status: FUNCTIONAL
Brand: ECHELON ENDOPATH

## 2025-07-22 DEVICE — LIGACLIP 10-M/L, 10MM ENDOSCOPIC ROTATING MULTIPLE CLIP APPLIERS
Type: IMPLANTABLE DEVICE | Site: ABDOMEN | Status: FUNCTIONAL
Brand: LIGACLIP

## 2025-07-22 DEVICE — ENDOPATH ECHELON ENDOSCOPIC LINEAR CUTTER RELOADS, WHITE, 60MM
Type: IMPLANTABLE DEVICE | Site: COLON | Status: FUNCTIONAL
Brand: ECHELON ENDOPATH

## 2025-07-22 RX ORDER — LANOLIN ALCOHOL/MO/W.PET/CERES
50 CREAM (GRAM) TOPICAL DAILY
Status: DISCONTINUED | OUTPATIENT
Start: 2025-07-22 | End: 2025-07-23 | Stop reason: HOSPADM

## 2025-07-22 RX ORDER — PROPOFOL 10 MG/ML
VIAL (ML) INTRAVENOUS AS NEEDED
Status: DISCONTINUED | OUTPATIENT
Start: 2025-07-22 | End: 2025-07-22 | Stop reason: SURG

## 2025-07-22 RX ORDER — PROMETHAZINE HYDROCHLORIDE 25 MG/1
25 SUPPOSITORY RECTAL ONCE AS NEEDED
Status: DISCONTINUED | OUTPATIENT
Start: 2025-07-22 | End: 2025-07-22 | Stop reason: HOSPADM

## 2025-07-22 RX ORDER — OXYCODONE AND ACETAMINOPHEN 5; 325 MG/1; MG/1
1 TABLET ORAL EVERY 4 HOURS PRN
Status: DISCONTINUED | OUTPATIENT
Start: 2025-07-22 | End: 2025-07-23 | Stop reason: HOSPADM

## 2025-07-22 RX ORDER — DROPERIDOL 2.5 MG/ML
INJECTION, SOLUTION INTRAMUSCULAR; INTRAVENOUS
Status: COMPLETED
Start: 2025-07-22 | End: 2025-07-22

## 2025-07-22 RX ORDER — ONDANSETRON 2 MG/ML
4 INJECTION INTRAMUSCULAR; INTRAVENOUS ONCE AS NEEDED
Status: DISCONTINUED | OUTPATIENT
Start: 2025-07-22 | End: 2025-07-22 | Stop reason: HOSPADM

## 2025-07-22 RX ORDER — SODIUM CHLORIDE, SODIUM LACTATE, POTASSIUM CHLORIDE, CALCIUM CHLORIDE 600; 310; 30; 20 MG/100ML; MG/100ML; MG/100ML; MG/100ML
125 INJECTION, SOLUTION INTRAVENOUS CONTINUOUS
Status: ACTIVE | OUTPATIENT
Start: 2025-07-22 | End: 2025-07-22

## 2025-07-22 RX ORDER — EPHEDRINE SULFATE 50 MG/ML
INJECTION INTRAVENOUS AS NEEDED
Status: DISCONTINUED | OUTPATIENT
Start: 2025-07-22 | End: 2025-07-22 | Stop reason: SURG

## 2025-07-22 RX ORDER — FLUTICASONE PROPIONATE 50 MCG
2 SPRAY, SUSPENSION (ML) NASAL DAILY
Status: DISCONTINUED | OUTPATIENT
Start: 2025-07-22 | End: 2025-07-23 | Stop reason: HOSPADM

## 2025-07-22 RX ORDER — IPRATROPIUM BROMIDE AND ALBUTEROL SULFATE 2.5; .5 MG/3ML; MG/3ML
3 SOLUTION RESPIRATORY (INHALATION) ONCE AS NEEDED
Status: DISCONTINUED | OUTPATIENT
Start: 2025-07-22 | End: 2025-07-22 | Stop reason: HOSPADM

## 2025-07-22 RX ORDER — SODIUM CHLORIDE 9 MG/ML
9 INJECTION, SOLUTION INTRAVENOUS AS NEEDED
Status: DISCONTINUED | OUTPATIENT
Start: 2025-07-22 | End: 2025-07-22 | Stop reason: HOSPADM

## 2025-07-22 RX ORDER — FAMOTIDINE 10 MG/ML
20 INJECTION, SOLUTION INTRAVENOUS
Status: COMPLETED | OUTPATIENT
Start: 2025-07-22 | End: 2025-07-22

## 2025-07-22 RX ORDER — NALOXONE HCL 0.4 MG/ML
0.4 VIAL (ML) INJECTION AS NEEDED
Status: DISCONTINUED | OUTPATIENT
Start: 2025-07-22 | End: 2025-07-22 | Stop reason: HOSPADM

## 2025-07-22 RX ORDER — SIMETHICONE 80 MG
80 TABLET,CHEWABLE ORAL 4 TIMES DAILY PRN
Status: DISCONTINUED | OUTPATIENT
Start: 2025-07-22 | End: 2025-07-23 | Stop reason: HOSPADM

## 2025-07-22 RX ORDER — SODIUM CHLORIDE 9 MG/ML
INJECTION, SOLUTION INTRAVENOUS AS NEEDED
Status: DISCONTINUED | OUTPATIENT
Start: 2025-07-22 | End: 2025-07-22 | Stop reason: HOSPADM

## 2025-07-22 RX ORDER — FENTANYL CITRATE 50 UG/ML
50 INJECTION, SOLUTION INTRAMUSCULAR; INTRAVENOUS
Status: DISCONTINUED | OUTPATIENT
Start: 2025-07-22 | End: 2025-07-22 | Stop reason: HOSPADM

## 2025-07-22 RX ORDER — NALOXONE HCL 0.4 MG/ML
0.1 VIAL (ML) INJECTION
Status: DISCONTINUED | OUTPATIENT
Start: 2025-07-22 | End: 2025-07-23 | Stop reason: HOSPADM

## 2025-07-22 RX ORDER — DEXAMETHASONE SODIUM PHOSPHATE 4 MG/ML
INJECTION, SOLUTION INTRA-ARTICULAR; INTRALESIONAL; INTRAMUSCULAR; INTRAVENOUS; SOFT TISSUE AS NEEDED
Status: DISCONTINUED | OUTPATIENT
Start: 2025-07-22 | End: 2025-07-22 | Stop reason: SURG

## 2025-07-22 RX ORDER — HYDRALAZINE HYDROCHLORIDE 20 MG/ML
5 INJECTION INTRAMUSCULAR; INTRAVENOUS
Status: DISCONTINUED | OUTPATIENT
Start: 2025-07-22 | End: 2025-07-22 | Stop reason: HOSPADM

## 2025-07-22 RX ORDER — HYDROMORPHONE HYDROCHLORIDE 1 MG/ML
0.5 INJECTION, SOLUTION INTRAMUSCULAR; INTRAVENOUS; SUBCUTANEOUS
Status: DISCONTINUED | OUTPATIENT
Start: 2025-07-22 | End: 2025-07-22 | Stop reason: HOSPADM

## 2025-07-22 RX ORDER — SODIUM CHLORIDE, SODIUM LACTATE, POTASSIUM CHLORIDE, CALCIUM CHLORIDE 600; 310; 30; 20 MG/100ML; MG/100ML; MG/100ML; MG/100ML
100 INJECTION, SOLUTION INTRAVENOUS CONTINUOUS
Status: DISCONTINUED | OUTPATIENT
Start: 2025-07-22 | End: 2025-07-23 | Stop reason: HOSPADM

## 2025-07-22 RX ORDER — DROPERIDOL 2.5 MG/ML
0.62 INJECTION, SOLUTION INTRAMUSCULAR; INTRAVENOUS
Status: DISCONTINUED | OUTPATIENT
Start: 2025-07-22 | End: 2025-07-22 | Stop reason: HOSPADM

## 2025-07-22 RX ORDER — PRENATAL VIT/IRON FUM/FOLIC AC 27MG-0.8MG
1 TABLET ORAL DAILY
Status: DISCONTINUED | OUTPATIENT
Start: 2025-07-22 | End: 2025-07-23 | Stop reason: HOSPADM

## 2025-07-22 RX ORDER — ONDANSETRON 4 MG/1
4 TABLET, ORALLY DISINTEGRATING ORAL EVERY 6 HOURS PRN
Status: DISCONTINUED | OUTPATIENT
Start: 2025-07-22 | End: 2025-07-23 | Stop reason: HOSPADM

## 2025-07-22 RX ORDER — LABETALOL HYDROCHLORIDE 5 MG/ML
5 INJECTION, SOLUTION INTRAVENOUS
Status: DISCONTINUED | OUTPATIENT
Start: 2025-07-22 | End: 2025-07-22 | Stop reason: HOSPADM

## 2025-07-22 RX ORDER — BUPIVACAINE HYDROCHLORIDE AND EPINEPHRINE 2.5; 5 MG/ML; UG/ML
INJECTION, SOLUTION EPIDURAL; INFILTRATION; INTRACAUDAL; PERINEURAL AS NEEDED
Status: DISCONTINUED | OUTPATIENT
Start: 2025-07-22 | End: 2025-07-22 | Stop reason: HOSPADM

## 2025-07-22 RX ORDER — FENTANYL CITRATE 50 UG/ML
INJECTION, SOLUTION INTRAMUSCULAR; INTRAVENOUS AS NEEDED
Status: DISCONTINUED | OUTPATIENT
Start: 2025-07-22 | End: 2025-07-22 | Stop reason: SURG

## 2025-07-22 RX ORDER — DOCUSATE SODIUM 100 MG/1
100 CAPSULE, LIQUID FILLED ORAL 2 TIMES DAILY PRN
Status: DISCONTINUED | OUTPATIENT
Start: 2025-07-22 | End: 2025-07-23 | Stop reason: HOSPADM

## 2025-07-22 RX ORDER — ONDANSETRON 2 MG/ML
4 INJECTION INTRAMUSCULAR; INTRAVENOUS EVERY 6 HOURS PRN
Status: DISCONTINUED | OUTPATIENT
Start: 2025-07-22 | End: 2025-07-23 | Stop reason: HOSPADM

## 2025-07-22 RX ORDER — SODIUM CHLORIDE, SODIUM LACTATE, POTASSIUM CHLORIDE, CALCIUM CHLORIDE 600; 310; 30; 20 MG/100ML; MG/100ML; MG/100ML; MG/100ML
9 INJECTION, SOLUTION INTRAVENOUS CONTINUOUS
Status: DISCONTINUED | OUTPATIENT
Start: 2025-07-22 | End: 2025-07-23

## 2025-07-22 RX ORDER — SUCCINYLCHOLINE/SOD CL,ISO/PF 200MG/10ML
SYRINGE (ML) INTRAVENOUS AS NEEDED
Status: DISCONTINUED | OUTPATIENT
Start: 2025-07-22 | End: 2025-07-22 | Stop reason: SURG

## 2025-07-22 RX ORDER — HYDROMORPHONE HYDROCHLORIDE 1 MG/ML
0.2 INJECTION, SOLUTION INTRAMUSCULAR; INTRAVENOUS; SUBCUTANEOUS
Status: DISCONTINUED | OUTPATIENT
Start: 2025-07-22 | End: 2025-07-23 | Stop reason: HOSPADM

## 2025-07-22 RX ORDER — DROPERIDOL 2.5 MG/ML
0.62 INJECTION, SOLUTION INTRAMUSCULAR; INTRAVENOUS ONCE AS NEEDED
Status: DISCONTINUED | OUTPATIENT
Start: 2025-07-22 | End: 2025-07-22 | Stop reason: HOSPADM

## 2025-07-22 RX ORDER — SODIUM CHLORIDE 0.9 % (FLUSH) 0.9 %
3 SYRINGE (ML) INJECTION EVERY 12 HOURS SCHEDULED
Status: DISCONTINUED | OUTPATIENT
Start: 2025-07-22 | End: 2025-07-22 | Stop reason: HOSPADM

## 2025-07-22 RX ORDER — SODIUM CHLORIDE, SODIUM LACTATE, POTASSIUM CHLORIDE, CALCIUM CHLORIDE 600; 310; 30; 20 MG/100ML; MG/100ML; MG/100ML; MG/100ML
9 INJECTION, SOLUTION INTRAVENOUS ONCE
Status: COMPLETED | OUTPATIENT
Start: 2025-07-22 | End: 2025-07-22

## 2025-07-22 RX ORDER — HYDROCODONE BITARTRATE AND ACETAMINOPHEN 5; 325 MG/1; MG/1
1 TABLET ORAL ONCE AS NEEDED
Status: DISCONTINUED | OUTPATIENT
Start: 2025-07-22 | End: 2025-07-22 | Stop reason: HOSPADM

## 2025-07-22 RX ORDER — HYDROCODONE BITARTRATE AND ACETAMINOPHEN 7.5; 325 MG/1; MG/1
1 TABLET ORAL EVERY 4 HOURS PRN
Status: DISCONTINUED | OUTPATIENT
Start: 2025-07-22 | End: 2025-07-22 | Stop reason: HOSPADM

## 2025-07-22 RX ORDER — LIDOCAINE HYDROCHLORIDE 10 MG/ML
INJECTION, SOLUTION EPIDURAL; INFILTRATION; INTRACAUDAL; PERINEURAL AS NEEDED
Status: DISCONTINUED | OUTPATIENT
Start: 2025-07-22 | End: 2025-07-22 | Stop reason: SURG

## 2025-07-22 RX ORDER — PROMETHAZINE HYDROCHLORIDE 25 MG/1
25 TABLET ORAL ONCE AS NEEDED
Status: DISCONTINUED | OUTPATIENT
Start: 2025-07-22 | End: 2025-07-22 | Stop reason: HOSPADM

## 2025-07-22 RX ORDER — SODIUM CHLORIDE 0.9 % (FLUSH) 0.9 %
3-10 SYRINGE (ML) INJECTION AS NEEDED
Status: DISCONTINUED | OUTPATIENT
Start: 2025-07-22 | End: 2025-07-22 | Stop reason: HOSPADM

## 2025-07-22 RX ORDER — BISACODYL 5 MG/1
10 TABLET, DELAYED RELEASE ORAL DAILY
Status: DISCONTINUED | OUTPATIENT
Start: 2025-07-23 | End: 2025-07-23 | Stop reason: HOSPADM

## 2025-07-22 RX ORDER — ONDANSETRON 2 MG/ML
INJECTION INTRAMUSCULAR; INTRAVENOUS AS NEEDED
Status: DISCONTINUED | OUTPATIENT
Start: 2025-07-22 | End: 2025-07-22 | Stop reason: SURG

## 2025-07-22 RX ORDER — PANTOPRAZOLE SODIUM 40 MG/1
40 TABLET, DELAYED RELEASE ORAL
Status: DISCONTINUED | OUTPATIENT
Start: 2025-07-23 | End: 2025-07-23 | Stop reason: HOSPADM

## 2025-07-22 RX ORDER — FENTANYL CITRATE 50 UG/ML
INJECTION, SOLUTION INTRAMUSCULAR; INTRAVENOUS
Status: COMPLETED
Start: 2025-07-22 | End: 2025-07-22

## 2025-07-22 RX ORDER — FAMOTIDINE 20 MG/1
20 TABLET, FILM COATED ORAL 2 TIMES DAILY
Status: DISCONTINUED | OUTPATIENT
Start: 2025-07-22 | End: 2025-07-22 | Stop reason: SDUPTHER

## 2025-07-22 RX ORDER — ROCURONIUM BROMIDE 10 MG/ML
INJECTION, SOLUTION INTRAVENOUS AS NEEDED
Status: DISCONTINUED | OUTPATIENT
Start: 2025-07-22 | End: 2025-07-22 | Stop reason: SURG

## 2025-07-22 RX ADMIN — PROPOFOL 200 MG: 10 INJECTION, EMULSION INTRAVENOUS at 17:54

## 2025-07-22 RX ADMIN — PRENATAL VITAMINS-IRON FUMARATE 27 MG IRON-FOLIC ACID 0.8 MG TABLET 1 TABLET: at 21:22

## 2025-07-22 RX ADMIN — HYDROMORPHONE HYDROCHLORIDE 1 MG: 1 INJECTION, SOLUTION INTRAMUSCULAR; INTRAVENOUS; SUBCUTANEOUS at 09:57

## 2025-07-22 RX ADMIN — ONDANSETRON 4 MG: 2 INJECTION, SOLUTION INTRAMUSCULAR; INTRAVENOUS at 18:46

## 2025-07-22 RX ADMIN — ROCURONIUM BROMIDE 5 MG: 10 INJECTION INTRAVENOUS at 18:39

## 2025-07-22 RX ADMIN — HYDROMORPHONE HYDROCHLORIDE 0.5 MG: 1 INJECTION, SOLUTION INTRAMUSCULAR; INTRAVENOUS; SUBCUTANEOUS at 19:23

## 2025-07-22 RX ADMIN — CYCLOBENZAPRINE HYDROCHLORIDE 5 MG: 10 TABLET, FILM COATED ORAL at 12:41

## 2025-07-22 RX ADMIN — HYDROMORPHONE HYDROCHLORIDE 1 MG: 1 INJECTION, SOLUTION INTRAMUSCULAR; INTRAVENOUS; SUBCUTANEOUS at 07:53

## 2025-07-22 RX ADMIN — ONDANSETRON 4 MG: 2 INJECTION INTRAMUSCULAR; INTRAVENOUS at 02:31

## 2025-07-22 RX ADMIN — FENTANYL CITRATE 100 MCG: 50 INJECTION, SOLUTION INTRAMUSCULAR; INTRAVENOUS at 17:54

## 2025-07-22 RX ADMIN — HYDROMORPHONE HYDROCHLORIDE 1 MG: 1 INJECTION, SOLUTION INTRAMUSCULAR; INTRAVENOUS; SUBCUTANEOUS at 12:41

## 2025-07-22 RX ADMIN — HYDROMORPHONE HYDROCHLORIDE 1 MG: 1 INJECTION, SOLUTION INTRAMUSCULAR; INTRAVENOUS; SUBCUTANEOUS at 02:27

## 2025-07-22 RX ADMIN — EPHEDRINE SULFATE 5 MG: 50 INJECTION INTRAVENOUS at 18:10

## 2025-07-22 RX ADMIN — FENTANYL CITRATE 50 MCG: 50 INJECTION, SOLUTION INTRAMUSCULAR; INTRAVENOUS at 19:14

## 2025-07-22 RX ADMIN — PROPOFOL 25 MG: 10 INJECTION, EMULSION INTRAVENOUS at 18:38

## 2025-07-22 RX ADMIN — PYRIDOXINE HCL TAB 50 MG 50 MG: 50 TAB at 21:22

## 2025-07-22 RX ADMIN — DROPERIDOL 0.62 MG: 2.5 INJECTION, SOLUTION INTRAMUSCULAR; INTRAVENOUS at 19:15

## 2025-07-22 RX ADMIN — SODIUM CHLORIDE, POTASSIUM CHLORIDE, SODIUM LACTATE AND CALCIUM CHLORIDE 9 ML/HR: 600; 310; 30; 20 INJECTION, SOLUTION INTRAVENOUS at 14:51

## 2025-07-22 RX ADMIN — ROCURONIUM BROMIDE 45 MG: 10 INJECTION INTRAVENOUS at 18:03

## 2025-07-22 RX ADMIN — LIDOCAINE HYDROCHLORIDE 70 MG: 10 INJECTION, SOLUTION EPIDURAL; INFILTRATION; INTRACAUDAL; PERINEURAL at 17:54

## 2025-07-22 RX ADMIN — DEXAMETHASONE SODIUM PHOSPHATE 8 MG: 4 INJECTION, SOLUTION INTRA-ARTICULAR; INTRALESIONAL; INTRAMUSCULAR; INTRAVENOUS; SOFT TISSUE at 18:03

## 2025-07-22 RX ADMIN — ROCURONIUM BROMIDE 5 MG: 10 INJECTION INTRAVENOUS at 17:54

## 2025-07-22 RX ADMIN — HYDROMORPHONE HYDROCHLORIDE 0.2 MG: 1 INJECTION, SOLUTION INTRAMUSCULAR; INTRAVENOUS; SUBCUTANEOUS at 21:22

## 2025-07-22 RX ADMIN — PIPERACILLIN AND TAZOBACTAM 3.38 G: 3; .375 INJECTION, POWDER, LYOPHILIZED, FOR SOLUTION INTRAVENOUS; PARENTERAL at 18:00

## 2025-07-22 RX ADMIN — FAMOTIDINE 20 MG: 10 INJECTION INTRAVENOUS at 14:50

## 2025-07-22 RX ADMIN — FAMOTIDINE 20 MG: 10 INJECTION INTRAVENOUS at 21:22

## 2025-07-22 RX ADMIN — SODIUM CHLORIDE, POTASSIUM CHLORIDE, SODIUM LACTATE AND CALCIUM CHLORIDE 125 ML/HR: 600; 310; 30; 20 INJECTION, SOLUTION INTRAVENOUS at 02:46

## 2025-07-22 RX ADMIN — Medication 180 MG: at 17:54

## 2025-07-22 RX ADMIN — PROPOFOL 50 MG: 10 INJECTION, EMULSION INTRAVENOUS at 18:13

## 2025-07-22 RX ADMIN — EPHEDRINE SULFATE 5 MG: 50 INJECTION INTRAVENOUS at 18:08

## 2025-07-22 RX ADMIN — CYCLOBENZAPRINE HYDROCHLORIDE 5 MG: 10 TABLET, FILM COATED ORAL at 04:34

## 2025-07-22 RX ADMIN — SUGAMMADEX 200 MG: 100 INJECTION, SOLUTION INTRAVENOUS at 18:49

## 2025-07-22 NOTE — CONSULTS
Patient Name:  Char Villatoro  YOB: 1990  [id number]       Patient Care Team:  Triny King APRN as PCP - General (Nurse Practitioner)  Eunice Hernandez MD as Consulting Physician (Obstetrics and Gynecology)      General Surgery Consult Note     Date of Consultation: 07/22/25    Referring Physician : Eunice Hernandez MD    Reason for Consult : Abdominal pain    Subjective     I have been asked to see  Char Villatoro , a 34 y.o. female in consultation for abdominal pain.  She is a G2, P1 female at 25 weeks gestational age reports a 1 day history of abdominal pain in the right mid abdomen.  She describes this as sharp and stabbing.  There is associated nausea and vomiting.  There are no fevers or chills.  She denies any dysuria.  Her last bowel movement was yesterday and normal.  She presented to the hospital and was admitted to the OB floor.  Subsequent evaluation including CT scan and MRI were concerning for acute appendicitis.  We have been asked to see her for possible surgical management.      Allergy: No Known Allergies    Medications:  famotidine, 20 mg, Intravenous, Q12H  piperacillin-tazobactam, 3.375 g, Intravenous, Once  piperacillin-tazobactam, 3.375 g, Intravenous, Q8H  [Transfer Hold] sodium chloride, 10 mL, Intravenous, Q12H  [Transfer Hold] sodium chloride, 10 mL, Intravenous, Q12H         No current facility-administered medications on file prior to encounter.     Current Outpatient Medications on File Prior to Encounter   Medication Sig    famotidine (PEPCID) 20 MG tablet Take 1 tablet by mouth 2 (Two) Times a Day. As needed    fluticasone (FLONASE) 50 MCG/ACT nasal spray Administer 2 sprays into the nostril(s) as directed by provider Daily.    Prenatal Vit-Fe Fumarate-FA (Prenatal Vitamin) 27-0.8 MG tablet Take  by mouth.       PMHx:   Past Medical History:   Diagnosis Date    Abnormal Pap smear of cervix     Disease of thyroid gland     H/O cold sores    -Obesity  with a BMI of 38    Past Surgical History:  Past Surgical History:   Procedure Laterality Date    CERVICAL BIOPSY  W/ LOOP ELECTRODE EXCISION  2018     SECTION N/A 3/31/2023    Procedure:  SECTION PRIMARY;  Surgeon: Yuridia Lu MD;  Location: [facility] LABOR DELIVERY;  Service: Obstetrics/Gynecology;  Laterality: N/A;    LEEP  2018    WISDOM TOOTH EXTRACTION  2019   - Ankle surgery      Family History: Noncontributory     Social History: Pt lives in [place].  .    Tobacco use: Denies     EtOH use : Denies    Illicit drug use: Denies      Review of Systems        Constitutional: No fevers, chills or malaise   Eyes: Denies visual changes    Cardiovascular: Denies chest pain, palpitations   Pulmonary: Denies cough or shortness of breath   Abdominal/ GI: See HPI    Genitourinary: Denies dysuria or hematuria   Musculoskeletal: Denies any but chronic joint aches, pains or deformities   Psychiatric: No recent mood changes   Neurologic: No paresthesias or loss of function          Objective     Physical Exam:      Vital Signs  /70 (BP Location: Right arm, Patient Position: Lying)   Pulse 92   Temp 97.7 °F (36.5 °C) (Temporal)   Resp 15   LMP 2025 (Exact Date)   SpO2 96%     Intake/Output Summary (Last 24 hours) at 2025 1500  Last data filed at 2025 0453  Gross per 24 hour   Intake --   Output 500 ml   Net -500 ml         Physical Exam:    Head: Normocephalic, atraumatic.   Eyes: Pupils equal, round, react to light and accommodation.   Mouth: Oral mucosa without lesions,   Neck: No masses, lymphadenopathy or carotid bruits bilaterally   CV: Rhythm  and rate regular , no  murmurs, rubs or gallops  Lungs: Clear  to auscultation bilaterally   Abdomen: Bowel sounds positive  , soft, gravid with fundal height above the umbilicus.  Tender in the right abdomen.  Groin : No obvious hernias bilaterally   Extremities:  No cyanosis, clubbing or edema bilaterally   Lymphatics: No  abnormal lymphadenopathy appreciated   Neurologic: No gross deficits       Results Review: I have personally reviewed all of the recent lab and imaging results available at this time.  Laboratory exam reveals a white count of 13,000 with a left shift.  Hemoglobin is 10.9 with a plate count of 174.  Urinalysis shows positive nitrites trace leukocytes 4+ bacteria but 3-6 epithelial cells.  CT scan of the abdomen and pelvis was personally viewed by me as well as the final dictated and edited report.  This shows a gravid uterus.  I do see evidence of acute appendicitis with some appendicoliths in the right mid abdomen.  There is no free air or free fluid.  There is inflammatory change.  This is most consistent with appendicitis.  Also reviewed was the MRI from today which again confirms acute appendicitis.           Assessment and Plan:    Acute appendicitis in pregnancy- She has evidence of acute appendicitis.  I think the best course of action would be for a laparoscopic appendectomy today.  I discussed the risks and benefits including the risk to the fetus at length with the patient and her , and they are agreeable to proceed.  We will proceed emergently today.        Active Hospital Problems    Diagnosis  POA    **RLQ abdominal pain [R10.31]  Yes      Resolved Hospital Problems   No resolved problems to display.            I discussed the patient's findings and my recommendations with the patient and/or family, as well as the primary team     Kentrell Dawkins MD  07/22/25  15:00 EDT        Dictated using Dragon Dictation

## 2025-07-22 NOTE — OP NOTE
Operative Report    Patient Name:  Char Villatoro  YOB: 1990  [id number]    7/22/2025      PREOPERATIVE DIAGNOSIS: Acute appendicitis       POSTOPERATIVE DIAGNOSIS: Same        PROCEDURE PERFORMED: Laparoscopic Appendectomy        SURGEON: Kentrell Dawkins MD      ASSISTANT:          SPECIMENS: Appendix and contents        ANESTHESIA: General.    EBL: Minimal        FINDINGS:  1. Acutely inflamed appendix without  perforation   2. Incidentally noted Meckel's diverticulum without inflammation        INDICATIONS:      The patient is a 34 y.o. female with a history of abdominal pain, concerning for acute appendicitis .  She is currently G2, P1 at 25 weeks gestational age.  Pre-operative imaging including CT scan and MRI confirmed the diagnosis. The risks and benefits of Laparoscopic appendectomy, possible open were discussed with the patient and their family and they agreed to proceed.          DESCRIPTION OF PROCEDURE:      After obtaining informed consent, the patient was taken to the operating room and placed in the supine position. After appropriate DVT and antibiotic prophylaxis, general anesthesia was induced. The abdomen was prepped and draped in standard sterile fashion, and after infiltrating the skin with local anesthetic, a 12mm skin incision was made 2 cm superior to the fundal height. Blunt dissection was carried down to the base of the fascia, which was grasped with a Kocher clamp and elevated anteriorly. A vertical midline incision was made at the base of the umbilicus, and blunt dissection was carried down into the peritoneal cavity. A stay suture of 0 Vicryl was then placed in figure-of-eight fashion around the defect, and a blunt trocar advanced without difficulty into the abdominal cavity.  The abdomen was insufflated with carbon dioxide gas to a pressure of 15 mmHg, and a laparoscope advanced through the trocar and the abdominal contents were inspected. There was no evidence of  bowel, bladder, uterine or visceral injury with entrance of the trocar . At this point, after infiltrating the skin with local anesthetic, a 5 mm trocar was placed in the subxiphoid area, and another 5 mm trocar placed in the left upper quadrant.     The right lower mid abdomen was inspected.  There was an acutely inflamed appendix identified, which was adherent to the right tube and had to be bluntly freed.  It was also fixed to the retroperitoneum, and using a combination of blunt and LigaSure dissection was freed as was the cecum to elevated more easily.  The subxiphoid 5 mm trocar site was upsized to a 12 mm trocar site.  Using meticulous blunt dissection  the base of the appendix was circumferentially cleared, and the appendix was divided flush with the cecum using a stapling device, with care taken not to encroach upon the ileocecal valve. An additional firing of the stapler was taken across the appendiceal mesentery, the appendix placed in an Endo Catch bag, and removed through the subxiphoid trocar site. The specimen was examined on the back table, was complete, and passed off as specimen.     The mesenteric and cecal staple lines were inspected, and free of bleeding or leak. The mesenteric staple line was reinforced with hemoclip application. The cecal staple line was oversewn with 3-0 silk sutures in Lembert fashion, with care taken not to encroach upon the ileocecal valve.  The right lower quadrant was irrigated with normal saline until clear, and there was no bleeding seen. There was an incidentally noted Meckel's diverticulum of < 2cm which was soft and not inflamed. The abdomen was desufflated and reinsufflated several times to make sure the pneumoperitoneum was not tamponading any bleeding, and there was none.      The abdomen was again irrigated with saline until clear, and all trocars removed under direct and laparoscopic visualization.  There was no bleeding from the right tube, and the uterus was  uninjured.  The fascia at the  was closed using the previously placed 0 Vicryl suture. The wounds were irrigated with normal saline, and closed in each area using absorbable subcuticular suture. The incisions were dressed in standard sterile fashion and covered with dry dressings. The patient recovered from anesthesia, was extubated in the operating room, and transferred to the PACU in stable condition.  All sponge and needle counts were correct times two at the completion of the procedure.     COMPLICATIONS: None           Kentrell Dawkins MD  7/22/2025  18:58 EDT

## 2025-07-22 NOTE — ANESTHESIA POSTPROCEDURE EVALUATION
Patient: Char Villatoro    Procedure Summary       Date: 07/22/25 Room / [facility]:  [facility]    Anesthesia Start: 1749 Anesthesia Stop: 1905    Procedure: APPENDECTOMY LAPAROSCOPIC (Abdomen) Diagnosis:     Surgeons: Kentrell Dawkins MD Provider: Dari López MD    Anesthesia Type: general ASA Status: 2 - Emergent            Anesthesia Type: general    Vitals  Vitals Value Taken Time   BP 94/49 07/22/25 19:01   Temp     Pulse 118 07/22/25 19:04   Resp     SpO2 95 % 07/22/25 19:04   Vitals shown include unfiled device data.        Post Anesthesia Care and Evaluation    Patient location during evaluation: PACU  Patient participation: complete - patient participated  Level of consciousness: awake and alert  Pain management: adequate    Airway patency: patent  Anesthetic complications: No anesthetic complications  PONV Status: none  Cardiovascular status: hemodynamically stable and acceptable  Respiratory status: nonlabored ventilation, acceptable and nasal cannula  Hydration status: acceptable    Comments: No apparent anesthesia complications noted at this time.  FHT documented by L+D post op

## 2025-07-22 NOTE — BRIEF OP NOTE
APPENDECTOMY LAPAROSCOPIC  Progress Note    Char Villatoro  7/22/2025    Pre-op Diagnosis:   Acute appendicitis       Post-Op Diagnosis Codes:  Same    Procedure(s):      Procedure(s):  APPENDECTOMY LAPAROSCOPIC              Surgeon(s):  Kentrell Dawkins MD    Anesthesia: General    Staff:   Circulator: Elham Gomez RN; Mariely Frias RN  Scrub Person: Aura Carmichael; Sallie Gomez RN; Sybil Godfrey  Nursing Assistant: Roz Gonzalez PCT       Estimated Blood Loss: minimal    Urine Voided: * No values recorded between 7/22/2025  5:49 PM and 7/22/2025  6:48 PM *    Specimens:                Specimens       ID Source Type Tests Collected By Collected At Frozen?    A Large Intestine, Appendix Tissue TISSUE PATHOLOGY EXAM   Kentrell Dawkins MD 7/22/25 2229     Description: appendix for permanent              Drains: * No LDAs found *    Findings:   Acutely inflamed appendix without perforation  Incidentally noted Meckel's diverticulum without inflammation      Complications: None          Kentrell Dawkins MD     Date: 7/22/2025  Time: 18:57 EDT         Home Suture Removal Text: Patient was provided a home suture removal kit and will remove their sutures at home.  If they have any questions or difficulties they will call the office.

## 2025-07-22 NOTE — PROGRESS NOTES
Daily Progress Note    Patient name: Char Villatoro  YOB: 1990   MRN: [id number]  Admission Date: 2025  Date of Service: 2025  Referring Provider: Eunice Hernandez MD    Char Villatoro is a 34 y.o.    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  SHe has had decreased appetite since  PM.  Reports fetal movement is normal  Denies leakage of amniotic fluid.  Denies vaginal bleeding    Objective:     Vital signs:  Temp:  [98.1 °F (36.7 °C)-98.9 °F (37.2 °C)] 98.9 °F (37.2 °C)  Heart Rate:  [90-93] 92  Resp:  [14-18] 14  BP: (109-117)/(58-72) 109/58    Abdomen: soft, tender in RLQ to palpation  Uterus: gravid, nontender  Extremities: nontender; no edema        FHT pos    Medications:  famotidine, 20 mg, Intravenous, Q12H  sodium chloride, 10 mL, Intravenous, Q12H  sodium chloride, 10 mL, Intravenous, Q12H         acetaminophen    bisacodyl    cyclobenzaprine    docusate sodium    HYDROmorphone    lidocaine PF 1%    ondansetron    ondansetron ODT    sodium chloride    sodium chloride    sodium chloride    Labs:  Lab Results (last 24 hours)       Procedure Component Value Units Date/Time    CBC & Differential [[id number]]  (Abnormal) Collected: 25 1016    Specimen: Blood Updated: 25 1039    Narrative:      The following orders were created for panel order CBC & Differential.  Procedure                               Abnormality         Status                     ---------                               -----------         ------                     CBC  Auto Differential[[id number]]        Abnormal            Final result                 Please view results for these tests on the individual orders.    CBC Auto Differential [[id number]]  (Abnormal) Collected: 07/22/25 1016    Specimen: Blood Updated: 07/22/25 1039     WBC 13.07 10*3/mm3      RBC 3.70 10*6/mm3      Hemoglobin 10.9 g/dL      Hematocrit 32.6 %      MCV 88.1 fL      MCH 29.5 pg      MCHC 33.4 g/dL      RDW 12.9 %      RDW-SD 41.9 fl      MPV 10.2 fL      Platelets 174 10*3/mm3      Neutrophil % 81.1 %      Lymphocyte % 10.2 %      Monocyte % 7.8 %      Eosinophil % 0.2 %      Basophil % 0.2 %      Immature Grans % 0.5 %      Neutrophils, Absolute 10.62 10*3/mm3      Lymphocytes, Absolute 1.33 10*3/mm3      Monocytes, Absolute 1.02 10*3/mm3      Eosinophils, Absolute 0.02 10*3/mm3      Basophils, Absolute 0.02 10*3/mm3      Immature Grans, Absolute 0.06 10*3/mm3      nRBC 0.0 /100 WBC     CBC & Differential [[id number]]  (Abnormal) Collected: 07/21/25 1832    Specimen: Blood Updated: 07/21/25 1846    Narrative:      The following orders were created for panel order CBC & Differential.  Procedure                               Abnormality         Status                     ---------                               -----------         ------                     CBC Auto Differential[[id number]]        Abnormal            Final result                 Please view results for these tests on the individual orders.    CBC Auto Differential [[id number]]  (Abnormal) Collected: 07/21/25 1832    Specimen: Blood Updated: 07/21/25 1846     WBC 13.87 10*3/mm3      RBC 3.93 10*6/mm3      Hemoglobin 11.5 g/dL      Hematocrit 35.0 %      MCV 89.1 fL      MCH 29.3 pg      MCHC 32.9 g/dL      RDW 13.0 %      RDW-SD 42.1 fl      MPV 10.3 fL      Platelets 189 10*3/mm3      Neutrophil % 80.9 %      Lymphocyte % 11.6 %      Monocyte % 6.9 %      Eosinophil % 0.1 %      Basophil % 0.1 %      Immature Grans % 0.4 %      Neutrophils,  Absolute 11.21 10*3/mm3      Lymphocytes, Absolute 1.61 10*3/mm3      Monocytes, Absolute 0.96 10*3/mm3      Eosinophils, Absolute 0.02 10*3/mm3      Basophils, Absolute 0.01 10*3/mm3      Immature Grans, Absolute 0.06 10*3/mm3      nRBC 0.0 /100 WBC     Urinalysis With Microscopic If Indicated (No Culture) - Urine, Clean Catch [[id number]]  (Abnormal) Collected: 25    Specimen: Urine, Clean Catch Updated: 25     Color, UA Dark Yellow     Appearance, UA Cloudy     pH, UA 6.5     Specific Gravity, UA >1.030     Glucose, UA Negative     Ketones, UA >=160 mg/dL (4+)     Bilirubin, UA Negative     Blood, UA Negative     Protein, UA Trace     Leuk Esterase, UA Trace     Nitrite, UA Positive     Urobilinogen, UA 1.0 E.U./dL    Urinalysis, Microscopic Only - Urine, Clean Catch [[id number]]  (Abnormal) Collected: 25    Specimen: Urine, Clean Catch Updated: 25     RBC, UA 0-2 /HPF      WBC, UA 11-20 /HPF      Bacteria, UA 4+ /HPF      Squamous Epithelial Cells, UA 3-6 /HPF      Hyaline Casts, UA None Seen /LPF      Methodology Automated Microscopy          Lab Results   Component Value Date    HGB 10.9 (L) 2025         Assessment/Plan:      Char is a 34 y.o.    at 25w4d.  1. RLQ abdominal pain:  continued pain in RLQ  CT neg however given lack of appetitie, continued pain and no other source for pain will proceed with MRI appy protocol  2. Continue NPO until after MRI results.  3. Fetal: FHR reassuring.      All questions were answered to the best of my ability.    Eunice Hernandez MD  2025

## 2025-07-22 NOTE — ANESTHESIA PREPROCEDURE EVALUATION
Anesthesia Evaluation     Patient summary reviewed and Nursing notes reviewed   NPO Solid Status: > 8 hours  NPO Liquid Status: > 2 hours           Airway   Mallampati: I  TM distance: >3 FB  Neck ROM: full  No difficulty expected  Dental      Pulmonary    (-) asthma, shortness of breath, recent URI, sleep apnea, not a smoker  Cardiovascular     (-) hypertension, past MI, dysrhythmias, angina, cardiac stents      Neuro/Psych  (-) seizures, CVA  GI/Hepatic/Renal/Endo    (+) obesity, morbid obesity  (-) no renal disease, diabetes, no thyroid disorder (No RX)    Musculoskeletal     Abdominal    Substance History      OB/GYN    (+) Pregnant (25 weeks)        Other        ROS/Med Hx Other: HCT 32 WCC 81K   FHT were normal pre op                         Anesthesia Plan    ASA 2 - emergent     general     (FHT pre and post   RSI CP ETT  SAÚL  Maintain MAP and Sats +or > Baseline )  intravenous induction     Anesthetic plan, risks, benefits, and alternatives have been provided, discussed and informed consent has been obtained with: patient.    Plan discussed with CRNA.        CODE STATUS:    Code Status (Patient has no pulse and is not breathing): CPR (Attempt to Resuscitate)  Medical Interventions (Patient has pulse or is breathing): Full Support  Level Of Support Discussed With: Patient

## 2025-07-22 NOTE — ANESTHESIA PROCEDURE NOTES
Airway  Date/Time: 7/22/2025 5:55 PM  Airway not difficult    General Information and Staff    Patient location during procedure: OR  Anesthesiologist: Dari López MD    Indications and Patient Condition  Indications for airway management: airway protection    Preoxygenated: yes    Mask difficulty assessment: 0 - not attempted    Final Airway Details    Final airway type: endotracheal airway      Successful airway: ETT  Cuffed: yes   Successful intubation technique: video laryngoscopy and RSI  Adjuncts used in placement: cricoid pressure  Endotracheal tube insertion site: oral  Blade: Rodriguez  Blade size: 3  ETT size (mm): 7.0  Cormack-Lehane Classification: grade I - full view of glottis  Placement verified by: chest auscultation and capnometry   Cuff volume (mL): 8  Measured from: teeth  ETT/EBT  to teeth (cm): 21  Number of attempts at approach: 1  Assessment: lips, teeth, and gum same as pre-op and atraumatic intubation

## 2025-07-22 NOTE — PAYOR COMM NOTE
"IrvingChar (34 y.o. Female)     From:Angelica Rodríguez LPN, Utilization Review  Phone #[phone]  Fax #[phone]    Antony ID#[id number]     Notification of admission.          Date of Birth   1990    Social Security Number       Address   [address] [place] [address]    Home Phone   [phone]    MRN   [id number]       Buddhist   Confucianist    Marital Status                               Admission Date   7/21/2025    Admission Type   Emergency    Admitting Provider   Eunice Hernandez MD    Attending Provider   Eunice Hernandez MD    Department, Room/Bed   [facility] OR, [facility] OR/MAIN OR       Discharge Date       Discharge Disposition       Discharge Destination                                 Attending Provider: Eunice Hernandez MD    Allergies: No Known Allergies    Isolation: None   Infection: None   Code Status: CPR    Ht: 172.7 cm (68\")   Wt: 114 kg (252 lb)    Admission Cmt: None   Principal Problem: RLQ abdominal pain [R10.31]                   Active Insurance as of 7/21/2025       Primary Coverage       Payor Plan Insurance Group Employer/Plan Group    ANTHEM BLUE CROSS ANTHEM BLUE CROSS BLUE SHIELD PPO [id number]       Payor Plan Address Payor Plan Phone Number Payor Plan Fax Number Effective Dates    [address] [phone]  1/1/2025 - None Entered    [facility] [id number]         Subscriber Name Subscriber Birth Date Member ID       GUNJAN GREGORY 1990 [id number]                     Emergency Contacts        (Rel.) Home Phone Work Phone Mobile Phone    IRVINGGUNJAN (Spouse) -- -- [phone]              [place]: NPI [id number] Tax ID [id number]  Insurance Information                  ANTHEM BLUE CROSS/ANTHEM BLUE CROSS BLUE SHIELD PPO Phone: [phone]    Subscriber: Gunjan Gregory Subscriber#: [id number]    Group#: [id number] Precert#: --    Authorization#: -- Effective Date: --             History & Physical        O'José Luisin, " "MD Santana at 25 1815          [facility]  Obstetric History and Physical    Referring Provider: Eunice Hernandez MD      Chief Complaint   Patient presents with    Back Pain    Pelvic Pain       HPI:    Patient is a 34 y.o. female  currently at 25w3d, who presents with a three day history of RLQ abdominal pain and right sided flank pain.  She say she has no appetite  and has not kept anything down since last night.   She say she does not feel nausea nor has she vomited just no desire to eat.   Denies fever or chills.   Her pain had been intermittent, but today she says it is more of a constant pain.  She say sit feels like a pulling especially when she goes from lying down or sitting down to standing.  She denies feeling contractions or cramping.    +FM.    Denies vaginal leaking and bleeding     After IV hydration, her pain has not improved and is actually worse and brought her to tears.             Prenatal Information:   Maternal Prenatal Labs  Blood Type No results found for: \"ABO\"   Rh Status No results found for: \"RH\"   Antibody Screen No results found for: \"ABSCRN\"   Gonnorhea No results found for: \"GCCX\"   Chlamydia No results found for: \"CLAMYDCU\"   RPR No results found for: \"RPR\"   Syphilis Antibody No results found for: \"SYPHILIS\"   Rubella No results found for: \"RUBELLAIGGIN\"   Hepatitis B Surface Antigen No results found for: \"HEPBSAG\"   HIV-1 Antibody No results found for: \"LABHIV1\"   Hepatitis C Antibody No results found for: \"HEPCAB\"   Rapid Urin Drug Screen No results found for: \"AMPMETHU\", \"BARBITSCNUR\", \"LABBENZSCN\", \"LABMETHSCN\", \"LABOPIASCN\", \"THCURSCR\", \"COCAINEUR\", \"AMPHETSCREEN\", \"PROPOXSCN\", \"BUPRENORSCNU\", \"METAMPSCNUR\", \"OXYCODONESCN\", \"TRICYCLICSCN\"   Group B Strep Culture No results found for: \"GBSANTIGEN\"           External Prenatal Results       Pregnancy Outside Results - Transcribed From Office Records - See Scanned Records For Details       Test Value Date Time    ABO  " A  25 1139    Rh  Positive  25 1139    Antibody Screen  Negative  07/15/25 1005       Negative  25 1139    Varicella IgG       Rubella  2.24 index 25 1139    Hgb  12.0 g/dL 07/15/25 1005       13.9 g/dL 25 1139    Hct  35.0 % 07/15/25 1005       42.0 % 25 1139    HgB A1c        1h GTT  102 mg/dL 07/15/25 1005    3h GTT Fasting       3h GTT 1 hour       3h GTT 2 hour       3h GTT 3 hour        Gonorrhea (discrete)  Negative  25 1139    Chlamydia (discrete)  Negative  25 1139    RPR  Non Reactive  07/15/25 1005       Non Reactive  25 1139    Syphils cascade: TP-Ab (FTA)       TP-Ab       TP-Ab (EIA)       TPPA       HBsAg  Negative  25 1139    Herpes Simplex Virus PCR       Herpes Simplex VIrus Culture       HIV  Non Reactive  25 1139    Hep C RNA Quant PCR       Hep C Antibody  Non Reactive  25 1139    AFP       NIPT       Cystic Fibrosis (Carolyn)       Cystic Fibroisis        Spinal Muscular atrophy       Fragile X       Group B Strep       GBS Susceptibility to Clindamycin       GBS Susceptibility to Erythromycin       Fetal Fibronectin       Genetic Testing, Maternal Blood                 Drug Screening       Test Value Date Time    Urine Drug Screen       Amphetamine Screen  Negative ng/mL 25 1139    Barbiturate Screen  Negative ng/mL 25 1139    Benzodiazepine Screen  Negative ng/mL 25 1139    Methadone Screen  Negative ng/mL 25 1139    Phencyclidine Screen  Negative ng/mL 25 1139    Opiates Screen       THC Screen       Cocaine Screen       Propoxyphene Screen  Negative ng/mL 25 1139    Buprenorphine Screen       Methamphetamine Screen       Oxycodone Screen       Tricyclic Antidepressants Screen                 Legend    ^: Historical                              Past OB History:       OB History    Para Term  AB Living   2 1 1 0 0 1   SAB IAB Ectopic Molar Multiple Live Births   0 0 0 0 0 1       # Outcome Date GA Lbr Meir/2nd Weight Sex Type Anes PTL Lv   2 Current            1 Term 25 39w2d  4211 g (9 lb 4.5 oz) F CS-LTranv EPI N SUZE      Complications: Failure to Progress in First Stage      Name: BEN GREGORY      Apgar1: 8  Apgar5: 9       Past Medical History: Past Medical History:   Diagnosis Date    Abnormal Pap smear of cervix     Disease of thyroid gland     H/O cold sores       Past Surgical History Past Surgical History:   Procedure Laterality Date    CERVICAL BIOPSY  W/ LOOP ELECTRODE EXCISION  2018     SECTION N/A 3/31/2023    Procedure:  SECTION PRIMARY;  Surgeon: Yuridia Lu MD;  Location: [facility] LABOR DELIVERY;  Service: Obstetrics/Gynecology;  Laterality: N/A;    LEEP  2018    WISDOM TOOTH EXTRACTION        Family History: Family History   Problem Relation Age of Onset    Breast cancer Maternal Aunt 43    Ovarian cancer Neg Hx     Uterine cancer Neg Hx     Colon cancer Neg Hx       Social History:  reports that she has never smoked. She has never used smokeless tobacco.   reports no history of alcohol use.   reports no history of drug use.                   General ROS Negative Findings:  All pertinent positives and negatives are addressed in the HPI        Vital Signs Range for the last 24 hours  Temperature: Temp:  [98.7 °F (37.1 °C)] 98.7 °F (37.1 °C)   Temp Source: Temp src: Oral   BP: BP: (111-117)/(66-72) 111/72   Pulse: Heart Rate:  [91] 91   Respirations: Resp:  [16] 16   SPO2: SpO2:  [95 %] 95 %   O2 Amount (l/min):     O2 Devices     Weight:       Physical Examination:   General:   alert, appears stated age, and cooperative            Chest: CTAB    Heart:   Regular rate    Abdominal  Soft, non-tender,  gravid uterus, mild RLQ pain tenderness with palpation .  No rebound tenderness.    Lower Extremities: +1   Back Mild right sided CVA tenderness                      Fetal Heart Rate Assessment   Method: Fetal HR Assessment Method: external    Beats/min: Fetal HR (beats/min): 150   Baseline: Fetal HR Baseline: normal [id number]   Varibility: Fetal HR Variability: moderate (amplitude range 6 to 25 bpm)mod   Accels: Fetal HR Accelerations: greater than/equal to 15 bpm, lasting at least 15 secondsy   Decels: Fetal HR Decelerations: absentn   Tracing Category:  1     Uterine Assessment   Method: Method: external tocotransducer   Frequency (min):  None    Ctx Count in 10 min:     Duration:     Intensity: Contraction Intensity: no contractions   Intensity by IUPC:     Resting Tone: Uterine Resting Tone: soft by palpation   Resting Tone by IUPC:     Ladd Units:       Laboratory Results:   Lab Results (last 24 hours)       Procedure Component Value Units Date/Time    CBC & Differential [[id number]]  (Abnormal) Collected: 07/21/25 1832    Specimen: Blood Updated: 07/21/25 1846    Narrative:      The following orders were created for panel order CBC & Differential.  Procedure                               Abnormality         Status                     ---------                               -----------         ------                     CBC Auto Differential[[id number]]        Abnormal            Final result                 Please view results for these tests on the individual orders.    CBC Auto Differential [[id number]]  (Abnormal) Collected: 07/21/25 1832    Specimen: Blood Updated: 07/21/25 1846     WBC 13.87 10*3/mm3      RBC 3.93 10*6/mm3      Hemoglobin 11.5 g/dL      Hematocrit 35.0 %      MCV 89.1 fL      MCH 29.3 pg      MCHC 32.9 g/dL      RDW 13.0 %      RDW-SD 42.1 fl      MPV 10.3 fL      Platelets 189 10*3/mm3      Neutrophil % 80.9 %      Lymphocyte % 11.6 %      Monocyte % 6.9 %      Eosinophil % 0.1 %      Basophil % 0.1 %      Immature Grans % 0.4 %      Neutrophils, Absolute 11.21 10*3/mm3      Lymphocytes, Absolute 1.61 10*3/mm3      Monocytes, Absolute 0.96 10*3/mm3      Eosinophils, Absolute 0.02 10*3/mm3      Basophils, Absolute 0.01  10*3/mm3      Immature Grans, Absolute 0.06 10*3/mm3      nRBC 0.0 /100 WBC     Urinalysis With Microscopic If Indicated (No Culture) - Urine, Clean Catch [[id number]]  (Abnormal) Collected: 07/21/25 1713    Specimen: Urine, Clean Catch Updated: 07/21/25 1729     Color, UA Dark Yellow     Appearance, UA Cloudy     pH, UA 6.5     Specific Gravity, UA >1.030     Glucose, UA Negative     Ketones, UA >=160 mg/dL (4+)     Bilirubin, UA Negative     Blood, UA Negative     Protein, UA Trace     Leuk Esterase, UA Trace     Nitrite, UA Positive     Urobilinogen, UA 1.0 E.U./dL    Urinalysis, Microscopic Only - Urine, Clean Catch [[id number]]  (Abnormal) Collected: 07/21/25 1713    Specimen: Urine, Clean Catch Updated: 07/21/25 1729     RBC, UA 0-2 /HPF      WBC, UA 11-20 /HPF      Bacteria, UA 4+ /HPF      Squamous Epithelial Cells, UA 3-6 /HPF      Hyaline Casts, UA None Seen /LPF      Methodology Automated Microscopy          Radiology Review:   Imaging Results (Last 24 Hours)       ** No results found for the last 24 hours. **          Other Studies:      Assessment & Plan            SALLIE Course / Assessment  1.   All lab and imaging results listed above have been reviewed by me.  2.   Intrauterine pregnancy at 25w3d gestation with reactive fetal status.    3.   UTI  -   4.   Worsening RLQ pain    Plan:     Admit to APU  CT Abd/Pelvis   Dilaudid 1 mg Q 2 hr prn   NPO until CT read  TID 1 hour fetal monitoring   Continue IV fluids and Rocephin Q 24 hrs             Santana Diaz MD  7/21/2025  19:27 EDT    Electronically signed by Santana Diaz MD at 07/21/25 1929       Vital Signs (last day)       Date/Time Temp Temp src Pulse Resp BP Patient Position SpO2    07/22/25 1446 97.7 (36.5) Temporal 92 15 111/70 Lying 96    07/22/25 1242 98.9 (37.2) Oral 92 14 109/58 Lying 97    07/22/25 0757 98.6 (37) Oral 93 16 109/66 Lying --    07/22/25 0753 -- -- 92 -- -- -- 98    07/22/25 0435 98.1 (36.7) Oral 90 18 109/59 Lying --     25 0344 -- -- -- 16 -- -- --    25 0040 98.6 (37) Oral 90 18 110/58 Lying --    25 1700 -- -- -- -- 111/72 -- --    25 1646 98.7 (37.1) Oral 91 16 117/66 -- 95          Facility-Administered Medications as of 2025   Medication Dose Route Frequency Provider Last Rate Last Admin    [Transfer Hold] acetaminophen (TYLENOL) tablet 650 mg  650 mg Oral Q4H PRN Santana Diaz MD   650 mg at 25    [Transfer Hold] bisacodyl (DULCOLAX) suppository 10 mg  10 mg Rectal Daily PRN Santana Diaz MD        [COMPLETED] cefTRIAXone (ROCEPHIN) 1,000 mg in sodium chloride 0.9 % 100 mL MBP  1,000 mg Intravenous Once Santana Diaz  mL/hr at 25 183 1,000 mg at 25 183    [Transfer Hold] cyclobenzaprine (FLEXERIL) tablet 5 mg  5 mg Oral TID PRN Lamar Ji MD   5 mg at 25 1241    [Transfer Hold] docusate sodium (COLACE) capsule 100 mg  100 mg Oral BID PRN Santana Diaz MD        famotidine (PEPCID) injection 20 mg  20 mg Intravenous Q12H Lamar Ji MD   20 mg at 25    [COMPLETED] famotidine (PEPCID) injection 20 mg  20 mg Intravenous 60 Min Pre-Op oRmaine Kahn MD   20 mg at 25 1450    [Transfer Hold] HYDROmorphone (DILAUDID) injection 1 mg  1 mg Intravenous Q2H PRN Santana Diaz MD   1 mg at 25 1241    [] lactated ringers infusion  125 mL/hr Intravenous Continuous Lamar Ji  mL/hr at 25 0246 125 mL/hr at 25 0246    [COMPLETED] lactated ringers infusion  9 mL/hr Intravenous Once Romaine Kahn MD 9 mL/hr at 25 1451 9 mL/hr at 25 1451    [Transfer Hold] lidocaine PF 1% (XYLOCAINE) injection 0.5 mL  0.5 mL Intradermal Once PRN Santana Diaz MD        [Transfer Hold] ondansetron (ZOFRAN) injection 4 mg  4 mg Intravenous Q6H PRN Lamar Ji MD   4 mg at 25 0231    [Transfer Hold] ondansetron ODT (ZOFRAN-ODT) disintegrating tablet 4 mg  4 mg Translingual Q6H PRN  Santana Diaz MD   4 mg at 07/21/25 1709    piperacillin-tazobactam (ZOSYN) 3.375 g IVPB in 100 mL NS MBP (CD)  3.375 g Intravenous Once Kentrell Dawkins MD        piperacillin-tazobactam (ZOSYN) 3.375 g IVPB in 100 mL NS MBP (CD)  3.375 g Intravenous Q8H Kentrell Dawkins MD        [COMPLETED] sodium chloride 0.45 % bolus 1,000 mL  1,000 mL Intravenous Once Santana Diaz MD 1,000 mL/hr at 07/21/25 1832 1,000 mL at 07/21/25 1832    [Transfer Hold] sodium chloride 0.9 % flush 10 mL  10 mL Intravenous Q12H Santana Diaz MD        [Transfer Hold] sodium chloride 0.9 % flush 10 mL  10 mL Intravenous PRN Santana Diaz MD        [Transfer Hold] sodium chloride 0.9 % flush 10 mL  10 mL Intravenous Q12H Santana Diaz MD   10 mL at 07/21/25 2059    [Transfer Hold] sodium chloride 0.9 % flush 10 mL  10 mL Intravenous PRN Santana Diaz MD        [Transfer Hold] sodium chloride 0.9 % infusion 40 mL  40 mL Intravenous Santana Yee MD         Lab Results (all)       Procedure Component Value Units Date/Time    CBC & Differential [[id number]]  (Abnormal) Collected: 07/22/25 1016    Specimen: Blood Updated: 07/22/25 1039    Narrative:      The following orders were created for panel order CBC & Differential.  Procedure                               Abnormality         Status                     ---------                               -----------         ------                     CBC Auto Differential[[id number]]        Abnormal            Final result                 Please view results for these tests on the individual orders.    CBC Auto Differential [[id number]]  (Abnormal) Collected: 07/22/25 1016    Specimen: Blood Updated: 07/22/25 1039     WBC 13.07 10*3/mm3      RBC 3.70 10*6/mm3      Hemoglobin 10.9 g/dL      Hematocrit 32.6 %      MCV 88.1 fL      MCH 29.5 pg      MCHC 33.4 g/dL      RDW 12.9 %      RDW-SD 41.9 fl      MPV 10.2 fL      Platelets 174 10*3/mm3      Neutrophil % 81.1 %       Lymphocyte % 10.2 %      Monocyte % 7.8 %      Eosinophil % 0.2 %      Basophil % 0.2 %      Immature Grans % 0.5 %      Neutrophils, Absolute 10.62 10*3/mm3      Lymphocytes, Absolute 1.33 10*3/mm3      Monocytes, Absolute 1.02 10*3/mm3      Eosinophils, Absolute 0.02 10*3/mm3      Basophils, Absolute 0.02 10*3/mm3      Immature Grans, Absolute 0.06 10*3/mm3      nRBC 0.0 /100 WBC     CBC & Differential [[id number]]  (Abnormal) Collected: 07/21/25 1832    Specimen: Blood Updated: 07/21/25 1846    Narrative:      The following orders were created for panel order CBC & Differential.  Procedure                               Abnormality         Status                     ---------                               -----------         ------                     CBC Auto Differential[[id number]]        Abnormal            Final result                 Please view results for these tests on the individual orders.    CBC Auto Differential [[id number]]  (Abnormal) Collected: 07/21/25 1832    Specimen: Blood Updated: 07/21/25 1846     WBC 13.87 10*3/mm3      RBC 3.93 10*6/mm3      Hemoglobin 11.5 g/dL      Hematocrit 35.0 %      MCV 89.1 fL      MCH 29.3 pg      MCHC 32.9 g/dL      RDW 13.0 %      RDW-SD 42.1 fl      MPV 10.3 fL      Platelets 189 10*3/mm3      Neutrophil % 80.9 %      Lymphocyte % 11.6 %      Monocyte % 6.9 %      Eosinophil % 0.1 %      Basophil % 0.1 %      Immature Grans % 0.4 %      Neutrophils, Absolute 11.21 10*3/mm3      Lymphocytes, Absolute 1.61 10*3/mm3      Monocytes, Absolute 0.96 10*3/mm3      Eosinophils, Absolute 0.02 10*3/mm3      Basophils, Absolute 0.01 10*3/mm3      Immature Grans, Absolute 0.06 10*3/mm3      nRBC 0.0 /100 WBC     Urinalysis With Microscopic If Indicated (No Culture) - Urine, Clean Catch [[id number]]  (Abnormal) Collected: 07/21/25 1713    Specimen: Urine, Clean Catch Updated: 07/21/25 1729     Color, UA Dark Yellow     Appearance, UA Cloudy     pH, UA 6.5     Specific [place], UA  >1.030     Glucose, UA Negative     Ketones, UA >=160 mg/dL (4+)     Bilirubin, UA Negative     Blood, UA Negative     Protein, UA Trace     Leuk Esterase, UA Trace     Nitrite, UA Positive     Urobilinogen, UA 1.0 E.U./dL    Urinalysis, Microscopic Only - Urine, Clean Catch [[id number]]  (Abnormal) Collected: 07/21/25 1713    Specimen: Urine, Clean Catch Updated: 07/21/25 1729     RBC, UA 0-2 /HPF      WBC, UA 11-20 /HPF      Bacteria, UA 4+ /HPF      Squamous Epithelial Cells, UA 3-6 /HPF      Hyaline Casts, UA None Seen /LPF      Methodology Automated Microscopy          Imaging Results (All)       Procedure Component Value Units Date/Time    MRI Abdomen Without Contrast [[id number]] Collected: 07/22/25 1254     Updated: 07/22/25 1303    Narrative:      MRI ABDOMEN WO CONTRAST    Date of Exam: 7/22/2025 11:21 AM EDT    Indication: Right lower quadrant abdominal pain.     Comparison: CT abdomen pelvis dated 7/21/2025    Technique:  Routine multiplanar/multisequence images of the abdomen were obtained without contrast administration.      Findings:  Note: Examination optimized for evaluation of appendicitis. Limited assessment of the fetus and gravid uterus. The placenta is posterior and fundal. Fetal presentation is breech.    There is an abnormal segment of mid and distal appendix with wall thickening and dilation measuring up to 10 mm in maximal diameter. There is surrounding inflammatory changes within the right lower quadrant mesenteric fat. There is an appendicolith near   the tip of the appendix. The base appears relatively normal. There is no evidence of perforation or abscess.    The gallbladder is present without wall thickening. There is no intrahepatic or extrahepatic biliary ductal dilatation.      Impression:      Impression:  Abnormal dilation of the mid and distal appendix measuring up to 10 mm in maximal diameter with appendicolith at the tip. There is surrounding inflammatory changes within the  right lower quadrant mesenteric fat. Findings most likely reflect early   appendicitis. There is no evidence of perforation or abscess.    Surgical consultation is recommended.        Electronically Signed: Navid Marin MD    7/22/2025 1:00 PM EDT    Workstation ID: [id number]    CT Abdomen Pelvis Without Contrast [[id number]] Collected: 07/21/25 2031     Updated: 07/21/25 2039    Narrative:      CT ABDOMEN PELVIS WO CONTRAST    Date of Exam: 7/21/2025 8:00 PM EDT    Indication: RLQ pain.    Comparison: None available.    Technique: Axial CT images were obtained of the abdomen and pelvis without the administration of contrast. Reconstructed coronal and sagittal images were also obtained. Automated exposure control and iterative construction methods were used.      Findings:  Included Chest: Bibasal atelectasis  Liver: No significant abnormality.  Gallbladder and biliary tree: No significant abnormality.  Spleen: No significant abnormality.  Pancreas: No significant abnormality.  Adrenal glands: No significant abnormality.  Kidneys and ureters: Minimal bilateral hydronephrosis, which may be physiologic in setting of pregnancy. No radiopaque calculi seen.  Stomach and duodenum:No significant abnormality.  Small and large bowel: Appendicolith in an otherwise normal-appearing appendix. No evidence of bowel obstruction. No significant pericolonic inflammatory changes seen.  Peritoneal cavity: No free fluid or free air.  Bladder: No significant abnormality.  Pelvic organs: Intrauterine gestation.  Vasculature: No significant abnormality  Lymph nodes: No pathologic appearing lymph nodes by imaging criteria.  Bones and soft tissues: Mild degenerative changes of the lower lumbar spine. No acute osseous abnormality.      Impression:      Impression:  No acute findings in the abdomen or pelvis.    Minimal bilateral hydronephrosis, which may be physiologic in setting of pregnancy. No radiopaque calculi seen.    Electronically  Signed: Wilfredo Lowe MD    7/21/2025 8:36 PM EDT    Workstation ID: [id number]          Orders (all)        Start     Ordered    07/22/25 2113  piperacillin-tazobactam (ZOSYN) 3.375 g IVPB in 100 mL NS MBP (CD)  Every 8 Hours         07/22/25 1426    07/22/25 1436  lactated ringers infusion  Once         07/22/25 1434    07/22/25 1434  Continuous Pulse Oximetry  Continuous         07/22/25 1434    07/22/25 1434  Insert Peripheral IV  Once         07/22/25 1434    07/22/25 1434  Saline Lock & Maintain IV Access  Continuous         07/22/25 1434    07/22/25 1433  famotidine (PEPCID) injection 20 mg  60 Minutes Pre-Op         07/22/25 1434    07/22/25 1428  piperacillin-tazobactam (ZOSYN) 3.375 g IVPB in 100 mL NS MBP (CD)  Once         07/22/25 1426    07/22/25 1425  NPO Diet NPO Type: Strict NPO, Sips with Meds  Diet Effective Now         07/22/25 1424    07/22/25 1425  Place Sequential Compression Device  Once         07/22/25 1424    07/22/25 1425  Patient to void in Pre-op holding  Misc Nursing Order (Specify)  Once        Comments: Patient to void in Pre-op holding    07/22/25 1424    07/22/25 1425  Obtain Informed Consent  Once         07/22/25 1424    07/22/25 1424  NPO Diet NPO Type: Strict NPO, Sips with Meds  Diet Effective Now,   Status:  Canceled         07/22/25 1423    07/22/25 1317  Inpatient General Surgery Consult  STAT        Specialty:  General Surgery  Provider:  (Not yet assigned)    07/22/25 1316    07/22/25 1023  MRI Abdomen With & Without Contrast  1 Time Imaging,   Status:  Canceled        Comments: Anorexia, Pain in RLQ    07/22/25 1023    07/22/25 1023  MRI Abdomen Without Contrast  1 Time Imaging        Comments: Anorexia, Pain in RLQ    07/22/25 1023    07/22/25 0928  CBC & Differential  STAT         07/22/25 0927    07/22/25 0928  CBC Auto Differential  PROCEDURE ONCE         07/22/25 0927    07/22/25 0330  lactated ringers infusion  Continuous         07/22/25 0241    07/21/25 2200   famotidine (PEPCID) injection 20 mg  Every 12 Hours Scheduled         07/21/25 2114 07/21/25 2114  [Transfer Hold]  ondansetron (ZOFRAN) injection 4 mg  Every 6 Hours PRN        (Medication was not reviewed on transfer)    07/21/25 2114 07/21/25 2100  [Transfer Hold]  sodium chloride 0.9 % flush 10 mL  Every 12 Hours Scheduled        (Medication was not reviewed on transfer)    07/21/25 1804 07/21/25 2100  [Transfer Hold]  sodium chloride 0.9 % flush 10 mL  Every 12 Hours Scheduled        (Medication was not reviewed on transfer)    07/21/25 1926 07/21/25 2048  Diet: Regular/House; Texture: Regular (IDDSI 7); Fluid Consistency: Thin (IDDSI 0)  Diet Effective Now,   Status:  Canceled         07/21/25 2047 07/21/25 2046  HYDROmorphone (DILAUDID) injection 0.5 mg  Every 2 Hours PRN,   Status:  Discontinued         07/21/25 2047 07/21/25 2046  [Transfer Hold]  HYDROmorphone (DILAUDID) injection 1 mg  Every 2 Hours PRN        (Medication was not reviewed on transfer)    07/21/25 2046 07/21/25 2046  [Transfer Hold]  cyclobenzaprine (FLEXERIL) tablet 5 mg  3 Times Daily PRN        (Medication was not reviewed on transfer)    07/21/25 2047 07/21/25 2000  Vital Signs q 4 while awake  Every 4 Hours      Comments: While the patient is awake.    07/21/25 1926 07/21/25 1927  Weigh Patient Daily  Daily       07/21/25 1926 07/21/25 1926  Admit To Obstetrics Inpatient  Once         07/21/25 1926 07/21/25 1926  Code Status and Medical Interventions: CPR (Attempt to Resuscitate); Full Support  Continuous         07/21/25 1926 07/21/25 1926  Place Sequential Compression Device  Once         07/21/25 1926 07/21/25 1926  Maintain Sequential Compression Device  Continuous         07/21/25 1926 07/21/25 1926  Notify Provider (Specified)  Continuous        Comments: Open Order Report to View Parameters Requiring Provider Notification    07/21/25 1926 07/21/25 1926  Notify Provider of  Tachysystole (Per Hospital Algorithm)  Continuous        Comments: Open Order Report to View Parameters Requiring Provider Notification    25  Notify Provider if Membranes Ruptured, Bleeding Greater Than 1 Pad Per Hour, Fetal Heart Tone Abnormality or Severe Pain  Continuous        Comments: Open Order Report to View Parameters Requiring Provider Notification    25  Initiate Group Beta Strep (GBS) Prophylaxis Protocol, If Criteria Met  Continuous        Comments: NO TREATMENT RECOMMENDED IF: 1) Maternal GBS Status Known Negative 2) Scheduled  Birth With Intact Membranes, Not in Labor 3) Maternal GBS Status Unknown, No Risk Factors  TREAT WITH ANTIBIOTICS IF:  1) Maternal GBS Status Known Positive 2) Maternal GBS Status Unknown With Risk Factors: a)  Previous Infant Affected By GBS Infection b) GBS Urinary Tract Infection (UTI) or Bacteriuria During Pregnancy c) Unexplained Maternal Fever (100.4F (38C) or Greater) During Labor d)  Prolonged Rupture of Membranes (18 or More Hours) e) Gestational Age Less Than 37 Weeks    25  Insert Peripheral IV  Once         25  Saline Lock & Maintain IV Access  Continuous,   Status:  Canceled         25  NST Moderate/High risk >24 weeks:   NST (One Hour) 3 Times Daily and PRN (Antepartum)  Per Order Details        Comments: For Antepartum Patients Greater Than 24 Weeks - NST Daily & Monitor Fetal Heart Tones For One Hour 3 Times Daily & PRN.    25  NPO Diet NPO Type: Strict NPO  Diet Effective Now,   Status:  Canceled         25  [Transfer Hold]  sodium chloride 0.9 % flush 10 mL  As Needed        (Medication was not reviewed on transfer)    25  [Transfer Hold]  sodium chloride 0.9 % infusion 40 mL  As Needed        (Medication was not reviewed on  transfer)    07/21/25 1926    07/21/25 1925  [Transfer Hold]  lidocaine PF 1% (XYLOCAINE) injection 0.5 mL  Once As Needed        (Medication was not reviewed on transfer)    07/21/25 1926 07/21/25 1925  [Transfer Hold]  acetaminophen (TYLENOL) tablet 650 mg  Every 4 Hours PRN        (Medication was not reviewed on transfer)    07/21/25 1926 07/21/25 1925  [Transfer Hold]  docusate sodium (COLACE) capsule 100 mg  2 Times Daily PRN        (Medication was not reviewed on transfer)    07/21/25 1926 07/21/25 1925  [Transfer Hold]  bisacodyl (DULCOLAX) suppository 10 mg  Daily PRN        (Medication was not reviewed on transfer)    07/21/25 1926 07/21/25 1925  CT Abdomen Pelvis Without Contrast  1 Time Imaging         07/21/25 1924 07/21/25 1900  cefTRIAXone (ROCEPHIN) 1,000 mg in sodium chloride 0.9 % 100 mL MBP  Once         07/21/25 1804    07/21/25 1900  sodium chloride 0.45 % bolus 1,000 mL  Once         07/21/25 1805    07/21/25 1812  CBC & Differential  STAT         07/21/25 1811    07/21/25 1812  Type & Screen  STAT         07/21/25 1811    07/21/25 1812  CBC Auto Differential  PROCEDURE ONCE         07/21/25 1811    07/21/25 1805  Insert Peripheral IV  Once         07/21/25 1804    07/21/25 1805  PERIPHERAL IV CARE - Connectors / Hubs Must Be Scrubbed 15 Seconds Using 70% Alcohol & Allowed to Dry Before Accessing Line  Continuous         07/21/25 1804    07/21/25 1804  [Transfer Hold]  sodium chloride 0.9 % flush 10 mL  As Needed        (Medication was not reviewed on transfer)    07/21/25 1804    07/21/25 1728  Urinalysis, Microscopic Only - Urine, Clean Catch  Once         07/21/25 1727    07/21/25 1710  Urinalysis With Microscopic If Indicated (No Culture) - Urine, Clean Catch  Once         07/21/25 1709    07/21/25 1704  [Transfer Hold]  ondansetron ODT (ZOFRAN-ODT) disintegrating tablet 4 mg  Every 6 Hours PRN        (Medication was not reviewed on transfer)    07/21/25 1704    Unscheduled   Change Peripheral IV Site  As Needed      Comments: Frequency Per Facility Policy    25 1804    Unscheduled  PERIPHERAL IV CARE - Change Dressing As Needed When Damp, Loose or Soiled  As Needed       25 1804    Unscheduled  Position Change - For Intra-Uterine Resusitation for Hypertonus, HyperStimulation or Non-Reassuring Fetal Status  As Needed       25 1926    Unscheduled  Vital Signs - Per Anesthesia Protocol  As Needed       25 1434    Unscheduled  Oxygen Therapy- Nasal Cannula; Titrate 1-6 LPM Per SpO2; 90 - 95%  Continuous PRN       25 1434    Signed and Held  Maintain Sequential Compression Device  Continuous         Signed and Held                  Operative/Procedure Notes (all)    No notes of this type exist for this encounter.          Physician Progress Notes (all)        Eunice Hernandez MD at 25 1318          MRI suggestive of appendicitis.  Surgery consult   Continue NPO    Electronically signed by Eunice Hernandez MD at 25 1321       Eunice Hernandez MD at 25 0930          Daily Progress Note    Patient name: Char Villatoro  YOB: 1990   MRN: [id number]  Admission Date: 2025  Date of Service: 2025  Referring Provider: Eunice Hernandez MD    Char Villatoro is a 34 y.o.    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  SHe has had decreased appetite since  PM.  Reports fetal movement is normal  Denies leakage of amniotic fluid.  Denies vaginal bleeding    Objective:     Vital signs:  Temp:  [98.1 °F  (36.7 °C)-98.9 °F (37.2 °C)] 98.9 °F (37.2 °C)  Heart Rate:  [90-93] 92  Resp:  [14-18] 14  BP: (109-117)/(58-72) 109/58    Abdomen: soft, tender in RLQ to palpation  Uterus: gravid, nontender  Extremities: nontender; no edema        FHT pos    Medications:  famotidine, 20 mg, Intravenous, Q12H  sodium chloride, 10 mL, Intravenous, Q12H  sodium chloride, 10 mL, Intravenous, Q12H         acetaminophen    bisacodyl    cyclobenzaprine    docusate sodium    HYDROmorphone    lidocaine PF 1%    ondansetron    ondansetron ODT    sodium chloride    sodium chloride    sodium chloride    Labs:  Lab Results (last 24 hours)       Procedure Component Value Units Date/Time    CBC & Differential [[id number]]  (Abnormal) Collected: 07/22/25 1016    Specimen: Blood Updated: 07/22/25 1039    Narrative:      The following orders were created for panel order CBC & Differential.  Procedure                               Abnormality         Status                     ---------                               -----------         ------                     CBC Auto Differential[[id number]]        Abnormal            Final result                 Please view results for these tests on the individual orders.    CBC Auto Differential [[id number]]  (Abnormal) Collected: 07/22/25 1016    Specimen: Blood Updated: 07/22/25 1039     WBC 13.07 10*3/mm3      RBC 3.70 10*6/mm3      Hemoglobin 10.9 g/dL      Hematocrit 32.6 %      MCV 88.1 fL      MCH 29.5 pg      MCHC 33.4 g/dL      RDW 12.9 %      RDW-SD 41.9 fl      MPV 10.2 fL      Platelets 174 10*3/mm3      Neutrophil % 81.1 %      Lymphocyte % 10.2 %      Monocyte % 7.8 %      Eosinophil % 0.2 %      Basophil % 0.2 %      Immature Grans % 0.5 %      Neutrophils, Absolute 10.62 10*3/mm3      Lymphocytes, Absolute 1.33 10*3/mm3      Monocytes, Absolute 1.02 10*3/mm3      Eosinophils, Absolute 0.02 10*3/mm3      Basophils, Absolute 0.02 10*3/mm3      Immature Grans, Absolute 0.06 10*3/mm3      nRBC 0.0  /100 WBC     CBC & Differential [[id number]]  (Abnormal) Collected: 07/21/25 1832    Specimen: Blood Updated: 07/21/25 1846    Narrative:      The following orders were created for panel order CBC & Differential.  Procedure                               Abnormality         Status                     ---------                               -----------         ------                     CBC Auto Differential[[id number]]        Abnormal            Final result                 Please view results for these tests on the individual orders.    CBC Auto Differential [[id number]]  (Abnormal) Collected: 07/21/25 1832    Specimen: Blood Updated: 07/21/25 1846     WBC 13.87 10*3/mm3      RBC 3.93 10*6/mm3      Hemoglobin 11.5 g/dL      Hematocrit 35.0 %      MCV 89.1 fL      MCH 29.3 pg      MCHC 32.9 g/dL      RDW 13.0 %      RDW-SD 42.1 fl      MPV 10.3 fL      Platelets 189 10*3/mm3      Neutrophil % 80.9 %      Lymphocyte % 11.6 %      Monocyte % 6.9 %      Eosinophil % 0.1 %      Basophil % 0.1 %      Immature Grans % 0.4 %      Neutrophils, Absolute 11.21 10*3/mm3      Lymphocytes, Absolute 1.61 10*3/mm3      Monocytes, Absolute 0.96 10*3/mm3      Eosinophils, Absolute 0.02 10*3/mm3      Basophils, Absolute 0.01 10*3/mm3      Immature Grans, Absolute 0.06 10*3/mm3      nRBC 0.0 /100 WBC     Urinalysis With Microscopic If Indicated (No Culture) - Urine, Clean Catch [[id number]]  (Abnormal) Collected: 07/21/25 1713    Specimen: Urine, Clean Catch Updated: 07/21/25 1729     Color, UA Dark Yellow     Appearance, UA Cloudy     pH, UA 6.5     Specific Gravity, UA >1.030     Glucose, UA Negative     Ketones, UA >=160 mg/dL (4+)     Bilirubin, UA Negative     Blood, UA Negative     Protein, UA Trace     Leuk Esterase, UA Trace     Nitrite, UA Positive     Urobilinogen, UA 1.0 E.U./dL    Urinalysis, Microscopic Only - Urine, Clean Catch [[id number]]  (Abnormal) Collected: 07/21/25 1713    Specimen: Urine, Clean Catch Updated: 07/21/25  1729     RBC, UA 0-2 /HPF      WBC, UA 11-20 /HPF      Bacteria, UA 4+ /HPF      Squamous Epithelial Cells, UA 3-6 /HPF      Hyaline Casts, UA None Seen /LPF      Methodology Automated Microscopy          Lab Results   Component Value Date    HGB 10.9 (L) 2025         Assessment/Plan:      Char is a 34 y.o.    at 25w4d.  1. RLQ abdominal pain:  continued pain in RLQ  CT neg however given lack of appetitie, continued pain and no other source for pain will proceed with MRI appy protocol  2. Continue NPO until after MRI results.  3. Fetal: FHR reassuring.      All questions were answered to the best of my ability.    Eunice Hernandez MD  2025      Electronically signed by Eunice Hernandez MD at 25 1316          Consult Notes (all)        Kentrell Dawkins MD at 25 1459        Consult Orders    1. Inpatient General Surgery Consult [[id number]] ordered by Eunice Hernandez MD at 25 1316                 Patient Name:  Char Villatoro  YOB: 1990  [id number]       Patient Care Team:  Triny King APRN as PCP - General (Nurse Practitioner)  Eunice Hernandez MD as Consulting Physician (Obstetrics and Gynecology)      General Surgery Consult Note     Date of Consultation: 25    Referring Physician : Eunice Hernandez MD    Reason for Consult : Abdominal pain    Subjective     I have been asked to see  Char Villatoro , a 34 y.o. female in consultation for abdominal pain.  She is a G2, P1 female at 25 weeks gestational age reports a 1 day history of abdominal pain in the right mid abdomen.  She describes this as sharp and stabbing.  There is associated nausea and vomiting.  There are no fevers or chills.  She denies any dysuria.  Her last bowel movement was yesterday and normal.  She presented to the hospital and was admitted to the OB floor.  Subsequent evaluation including CT scan and MRI were concerning for acute appendicitis.  We have been asked to see her  for possible surgical management.      Allergy: No Known Allergies    Medications:  famotidine, 20 mg, Intravenous, Q12H  piperacillin-tazobactam, 3.375 g, Intravenous, Once  piperacillin-tazobactam, 3.375 g, Intravenous, Q8H  [Transfer Hold] sodium chloride, 10 mL, Intravenous, Q12H  [Transfer Hold] sodium chloride, 10 mL, Intravenous, Q12H         No current facility-administered medications on file prior to encounter.     Current Outpatient Medications on File Prior to Encounter   Medication Sig    famotidine (PEPCID) 20 MG tablet Take 1 tablet by mouth 2 (Two) Times a Day. As needed    fluticasone (FLONASE) 50 MCG/ACT nasal spray Administer 2 sprays into the nostril(s) as directed by provider Daily.    Prenatal Vit-Fe Fumarate-FA (Prenatal Vitamin) 27-0.8 MG tablet Take  by mouth.       PMHx:   Past Medical History:   Diagnosis Date    Abnormal Pap smear of cervix     Disease of thyroid gland     H/O cold sores    -Obesity with a BMI of 38    Past Surgical History:  Past Surgical History:   Procedure Laterality Date    CERVICAL BIOPSY  W/ LOOP ELECTRODE EXCISION  2018     SECTION N/A 3/31/2023    Procedure:  SECTION PRIMARY;  Surgeon: Yuridia Lu MD;  Location: [facility] LABOR DELIVERY;  Service: Obstetrics/Gynecology;  Laterality: N/A;    LEEP  2018    WISDOM TOOTH EXTRACTION  2019   - Ankle surgery      Family History: Noncontributory     Social History: Pt lives in [place].  .    Tobacco use: Denies     EtOH use : Denies    Illicit drug use: Denies      Review of Systems        Constitutional: No fevers, chills or malaise   Eyes: Denies visual changes    Cardiovascular: Denies chest pain, palpitations   Pulmonary: Denies cough or shortness of breath   Abdominal/ GI: See HPI    Genitourinary: Denies dysuria or hematuria   Musculoskeletal: Denies any but chronic joint aches, pains or deformities   Psychiatric: No recent mood changes   Neurologic: No paresthesias or loss of  function         Objective     Physical Exam:      Vital Signs  /70 (BP Location: Right arm, Patient Position: Lying)   Pulse 92   Temp 97.7 °F (36.5 °C) (Temporal)   Resp 15   LMP 01/28/2025 (Exact Date)   SpO2 96%     Intake/Output Summary (Last 24 hours) at 7/22/2025 1500  Last data filed at 7/22/2025 0453  Gross per 24 hour   Intake --   Output 500 ml   Net -500 ml         Physical Exam:    Head: Normocephalic, atraumatic.   Eyes: Pupils equal, round, react to light and accommodation.   Mouth: Oral mucosa without lesions,   Neck: No masses, lymphadenopathy or carotid bruits bilaterally   CV: Rhythm  and rate regular , no  murmurs, rubs or gallops  Lungs: Clear  to auscultation bilaterally   Abdomen: Bowel sounds positive  , soft, gravid with fundal height above the umbilicus.  Tender in the right abdomen.  Groin : No obvious hernias bilaterally   Extremities:  No cyanosis, clubbing or edema bilaterally   Lymphatics: No abnormal lymphadenopathy appreciated   Neurologic: No gross deficits       Results Review: I have personally reviewed all of the recent lab and imaging results available at this time.  Laboratory exam reveals a white count of 13,000 with a left shift.  Hemoglobin is 10.9 with a plate count of 174.  Urinalysis shows positive nitrites trace leukocytes 4+ bacteria but 3-6 epithelial cells.  CT scan of the abdomen and pelvis was personally viewed by me as well as the final dictated and edited report.  This shows a gravid uterus.  I do see evidence of acute appendicitis with some appendicoliths in the right mid abdomen.  There is no free air or free fluid.  There is inflammatory change.  This is most consistent with appendicitis.  Also reviewed was the MRI from today which again confirms acute appendicitis.          Assessment and Plan:    Acute appendicitis in pregnancy- She has evidence of acute appendicitis.  I think the best course of action would be for a laparoscopic appendectomy  today.  I discussed the risks and benefits including the risk to the fetus at length with the patient and her , and they are agreeable to proceed.  We will proceed emergently today.        Active Hospital Problems    Diagnosis  POA    **RLQ abdominal pain [R10.31]  Yes      Resolved Hospital Problems   No resolved problems to display.            I discussed the patient's findings and my recommendations with the patient and/or family, as well as the primary team     Kentrell Dawkins MD  07/22/25  15:00 EDT        Dictated using Dragon Dictation      Electronically signed by Kentrell Dawkins MD at 07/22/25 1503        Date/Time Fetal HR Assessment Method Fetal HR (beats/min) Fetal HR Baseline Fetal HR Variability Fetal HR Accelerations Fetal HR Decelerations Fetal HR Tracing Category    07/22/25 1340 --  150  --  --  --  --  --     07/22/25 1100 external  145  normal range  moderate (amplitude range 6 to 25 bpm)  lasts at least 10 seconds (32 wks gest or less);greater than/equal to 10 bpm (32 wks gest or less)  absent  --     07/22/25 1045 external  145  normal range  moderate (amplitude range 6 to 25 bpm)  absent  absent  --     07/22/25 1030 external  145  normal range  moderate (amplitude range 6 to 25 bpm)  absent  absent  --     07/21/25 1830 --  150  normal range  moderate (amplitude range 6 to 25 bpm)  greater than/equal to 15 bpm;lasting at least 15 seconds  absent  --     07/21/25 1815 --  150  normal range  moderate (amplitude range 6 to 25 bpm)  greater than/equal to 15 bpm;lasting at least 15 seconds  absent  --     07/21/25 1800 --  150  normal range  moderate (amplitude range 6 to 25 bpm)  greater than/equal to 15 bpm;lasting at least 15 seconds  absent  --     07/21/25 1745 --  155  normal range  moderate (amplitude range 6 to 25 bpm)  greater than/equal to 15 bpm;lasting at least 15 seconds  absent  --     07/21/25 1730 --  150  normal range  moderate (amplitude range 6 to 25 bpm)  greater  than/equal to 15 bpm;lasting at least 15 seconds  absent  --     07/21/25 1715 --  155  normal range  moderate (amplitude range 6 to 25 bpm)  greater than/equal to 15 bpm;lasting at least 15 seconds  absent  tracing MHR pt vomitting RN at bedside  --     07/21/25 1700 external  --  poor tracing, monitor adjusted  --  --  --  --  tracing MHR pt vomsusy, RN at bedside  --            Date/Time Method Contraction Frequency (Minutes) Contraction Duration (sec) Contraction Intensity Uterine Resting Tone Contraction Pattern    07/22/25 1100 external tocotransducer  --  --  no contractions  --  --     07/22/25 1045 external tocotransducer  --  --  no contractions  --  --     07/22/25 1030 external tocotransducer  --  --  no contractions  --  --     07/22/25 1000 palpation  --  --  --  soft by palpation  --     07/22/25 0753 per patient report  --  --  no contractions  --  --     07/21/25 1830 --  --  --  no contractions  --  --     07/21/25 1815 --  --  --  no contractions  --  --     07/21/25 1800 --  --  --  no contractions  --  --     07/21/25 1745 --  --  --  no contractions  --  --     07/21/25 1730 --  --  --  no contractions  --  --     07/21/25 1715 --  --  --  no contractions  --  --     07/21/25 1700 external tocotransducer  --  --  no contractions  soft by palpation  --

## 2025-07-23 VITALS
DIASTOLIC BLOOD PRESSURE: 61 MMHG | TEMPERATURE: 97.7 F | SYSTOLIC BLOOD PRESSURE: 108 MMHG | OXYGEN SATURATION: 95 % | HEART RATE: 98 BPM | RESPIRATION RATE: 16 BRPM

## 2025-07-23 LAB
ANION GAP SERPL CALCULATED.3IONS-SCNC: 9.5 MMOL/L (ref 5–15)
BASOPHILS # BLD AUTO: 0.02 10*3/MM3 (ref 0–0.2)
BASOPHILS NFR BLD AUTO: 0.2 % (ref 0–1.5)
BUN SERPL-MCNC: 4.1 MG/DL (ref 6–20)
BUN/CREAT SERPL: 7.5 (ref 7–25)
CALCIUM SPEC-SCNC: 8 MG/DL (ref 8.6–10.5)
CHLORIDE SERPL-SCNC: 108 MMOL/L (ref 98–107)
CO2 SERPL-SCNC: 17.5 MMOL/L (ref 22–29)
CREAT SERPL-MCNC: 0.55 MG/DL (ref 0.57–1)
DEPRECATED RDW RBC AUTO: 42.5 FL (ref 37–54)
EGFRCR SERPLBLD CKD-EPI 2021: 125.1 ML/MIN/1.73
EOSINOPHIL # BLD AUTO: 0.01 10*3/MM3 (ref 0–0.4)
EOSINOPHIL NFR BLD AUTO: 0.1 % (ref 0.3–6.2)
ERYTHROCYTE [DISTWIDTH] IN BLOOD BY AUTOMATED COUNT: 12.9 % (ref 12.3–15.4)
GLUCOSE SERPL-MCNC: 113 MG/DL (ref 65–99)
HCT VFR BLD AUTO: 31.9 % (ref 34–46.6)
HGB BLD-MCNC: 10.6 G/DL (ref 12–15.9)
IMM GRANULOCYTES # BLD AUTO: 0.05 10*3/MM3 (ref 0–0.05)
IMM GRANULOCYTES NFR BLD AUTO: 0.5 % (ref 0–0.5)
LYMPHOCYTES # BLD AUTO: 1.13 10*3/MM3 (ref 0.7–3.1)
LYMPHOCYTES NFR BLD AUTO: 10.2 % (ref 19.6–45.3)
MCH RBC QN AUTO: 29.7 PG (ref 26.6–33)
MCHC RBC AUTO-ENTMCNC: 33.2 G/DL (ref 31.5–35.7)
MCV RBC AUTO: 89.4 FL (ref 79–97)
MONOCYTES # BLD AUTO: 0.91 10*3/MM3 (ref 0.1–0.9)
MONOCYTES NFR BLD AUTO: 8.2 % (ref 5–12)
NEUTROPHILS NFR BLD AUTO: 8.96 10*3/MM3 (ref 1.7–7)
NEUTROPHILS NFR BLD AUTO: 80.8 % (ref 42.7–76)
NRBC BLD AUTO-RTO: 0 /100 WBC (ref 0–0.2)
PLATELET # BLD AUTO: 189 10*3/MM3 (ref 140–450)
PMV BLD AUTO: 10 FL (ref 6–12)
POTASSIUM SERPL-SCNC: 4 MMOL/L (ref 3.5–5.2)
RBC # BLD AUTO: 3.57 10*6/MM3 (ref 3.77–5.28)
SODIUM SERPL-SCNC: 135 MMOL/L (ref 136–145)
WBC NRBC COR # BLD AUTO: 11.08 10*3/MM3 (ref 3.4–10.8)

## 2025-07-23 PROCEDURE — 25010000002 PIPERACILLIN SOD-TAZOBACTAM PER 1 G: Performed by: SURGERY

## 2025-07-23 PROCEDURE — 25810000003 LACTATED RINGERS PER 1000 ML: Performed by: SURGERY

## 2025-07-23 PROCEDURE — 80048 BASIC METABOLIC PNL TOTAL CA: CPT | Performed by: SURGERY

## 2025-07-23 PROCEDURE — 85025 COMPLETE CBC W/AUTO DIFF WBC: CPT | Performed by: SURGERY

## 2025-07-23 PROCEDURE — 99239 HOSP IP/OBS DSCHRG MGMT >30: CPT | Performed by: OBSTETRICS & GYNECOLOGY

## 2025-07-23 PROCEDURE — 59025 FETAL NON-STRESS TEST: CPT

## 2025-07-23 RX ORDER — ONDANSETRON 4 MG/1
4 TABLET, FILM COATED ORAL EVERY 8 HOURS PRN
Qty: 30 TABLET | Refills: 0 | Status: SHIPPED | OUTPATIENT
Start: 2025-07-23

## 2025-07-23 RX ORDER — OXYCODONE HYDROCHLORIDE 5 MG/1
5 TABLET ORAL EVERY 6 HOURS PRN
Qty: 10 TABLET | Refills: 0 | Status: SHIPPED | OUTPATIENT
Start: 2025-07-23

## 2025-07-23 RX ORDER — DOCUSATE SODIUM 100 MG/1
100 CAPSULE, LIQUID FILLED ORAL 2 TIMES DAILY
Qty: 60 CAPSULE | Refills: 0 | Status: SHIPPED | OUTPATIENT
Start: 2025-07-23

## 2025-07-23 RX ORDER — OXYCODONE AND ACETAMINOPHEN 5; 325 MG/1; MG/1
1 TABLET ORAL EVERY 4 HOURS PRN
Qty: 17 TABLET | Refills: 0 | Status: SHIPPED | OUTPATIENT
Start: 2025-07-23 | End: 2025-07-23 | Stop reason: SDUPTHER

## 2025-07-23 RX ORDER — AMOXICILLIN AND CLAVULANATE POTASSIUM 500; 125 MG/1; MG/1
1 TABLET, FILM COATED ORAL 3 TIMES DAILY
Qty: 15 TABLET | Refills: 0 | Status: SHIPPED | OUTPATIENT
Start: 2025-07-23

## 2025-07-23 RX ADMIN — PRENATAL VITAMINS-IRON FUMARATE 27 MG IRON-FOLIC ACID 0.8 MG TABLET 1 TABLET: at 08:07

## 2025-07-23 RX ADMIN — ACETAMINOPHEN 650 MG: 325 TABLET ORAL at 08:07

## 2025-07-23 RX ADMIN — BISACODYL 10 MG: 5 TABLET, COATED ORAL at 08:07

## 2025-07-23 RX ADMIN — PIPERACILLIN AND TAZOBACTAM 3.38 G: 3; .375 INJECTION, POWDER, LYOPHILIZED, FOR SOLUTION INTRAVENOUS at 00:30

## 2025-07-23 RX ADMIN — PYRIDOXINE HCL TAB 50 MG 50 MG: 50 TAB at 08:07

## 2025-07-23 RX ADMIN — PANTOPRAZOLE SODIUM 40 MG: 40 TABLET, DELAYED RELEASE ORAL at 06:30

## 2025-07-23 RX ADMIN — OXYCODONE HYDROCHLORIDE AND ACETAMINOPHEN 1 TABLET: 5; 325 TABLET ORAL at 03:54

## 2025-07-23 RX ADMIN — PIPERACILLIN AND TAZOBACTAM 3.38 G: 3; .375 INJECTION, POWDER, LYOPHILIZED, FOR SOLUTION INTRAVENOUS at 08:08

## 2025-07-23 RX ADMIN — SODIUM CHLORIDE, POTASSIUM CHLORIDE, SODIUM LACTATE AND CALCIUM CHLORIDE 100 ML/HR: 600; 310; 30; 20 INJECTION, SOLUTION INTRAVENOUS at 08:15

## 2025-07-23 NOTE — PROGRESS NOTES
Patient Name:  Char Villatoro  YOB: 1990  [id number]    Surgery Progress Note    Date of visit: 7/23/2025    Subjective   Subjective: Feeling better this AM, tolerating PO.         Objective     Objective:     /60 (BP Location: Right arm, Patient Position: Sitting)   Pulse 88   Temp 98.9 °F (37.2 °C) (Oral)   Resp 16   LMP 01/28/2025 (Exact Date)   SpO2 95%     Intake/Output Summary (Last 24 hours) at 7/23/2025 0859  Last data filed at 7/23/2025 0500  Gross per 24 hour   Intake 900 ml   Output 1900 ml   Net -1000 ml       CV:  Rhythm  regular and rate regular   L:  Clear  to auscultation bilaterally   Abd:  Bowel sounds positive , soft, appropriately tender  Ext:  No cyanosis, clubbing, edema    Recent labs that are back at this time have been reviewed. Fetal heart tones stable and normal.            Assessment/ Plan:    Problem List Items Addressed This Visit          Gastrointestinal Abdominal     * (Principal) Acute appendicitis with localized peritonitis, without perforation, abscess, or gangrene - Primary- Doing well after Lap appendectomy. OK to go home from my standpoint. PO antibiotics to complete a 5 day course. RTC with me in 4 weeks.       Relevant Medications    oxyCODONE-acetaminophen (PERCOCET) 5-325 MG per tablet     Other Visit Diagnoses         Appendicitis        Relevant Orders    Tissue Pathology Exam             Active Hospital Problems    Diagnosis  POA    **Acute appendicitis with localized peritonitis, without perforation, abscess, or gangrene [K35.30]  Yes      Resolved Hospital Problems   No resolved problems to display.              Kentrell Dawkins MD  7/23/2025  08:59 EDT

## 2025-07-23 NOTE — DISCHARGE SUMMARY
Admission date: 2025  Discharge date: 25    Referring Provider: Eunice Hernandez MD    Admission diagnosis:  RLQ abdominal pain [R10.31]      Acute appendicitis with localized peritonitis, without perforation, abscess, or gangrene       Discharge diagnosis:  Appendicitis      Consultants: General Surgery      Hospital course:  35 yo  admitted at 25 weeks with RLQ pain. Initial CT neg however given continued pain and anorexia MRI ordered and appendicitis diagnosed.  Surgery promptly proceeded with appendectomy.  On POD #1 she was d/c home to f/u with me in 1-2 weeks.  Baby reassuring.        Vitals:    25 0753   BP: 106/60   Pulse: 88   Resp: 16   Temp: 98.9 °F (37.2 °C)   SpO2:        GENERAL:  Well-developed, well-nourished in no acute distress.   ABD:  Gravid  NST:  Reassuring for gest age.  EXTREMITIES:  No clubbing, cyanosis or edema.  PSYCHIATRIC:  Normal affect and mood.      Discharge condition: stable  Discharge diet   Dietary Orders (From admission, onward)       Start     Ordered    25 0758  Diet: Regular/House; Fluid Consistency: Thin (IDDSI 0)  Diet Effective Now        References:    Diet Order Definitions   Question Answer Comment   Diets: Regular/House    Fluid Consistency: Thin (IDDSI 0)        25 0757    25  Snack: Chewing Gum to Increase Saliva Flow & Provide Gas Pain Relief  Once        Comments: Chewing Gum to Increase Saliva Flow & Provide Gas Pain Relief    25                  Additional Instructions: Call with fevers, uncontrolled nausea/vomiting/pain.  Medications:      Discharge Medications        New Medications        Instructions Start Date   amoxicillin-clavulanate 500-125 MG per tablet  Commonly known as: Augmentin   500 mg, Oral, 3 Times Daily      ondansetron 4 MG tablet  Commonly known as: ZOFRAN   4 mg, Oral, Every 8 Hours PRN      oxyCODONE 5 MG immediate release tablet  Commonly known as: ROXICODONE   5 mg, Oral, Every 6 Hours  PRN      oxyCODONE-acetaminophen 5-325 MG per tablet  Commonly known as: PERCOCET   1 tablet, Oral, Every 4 Hours PRN      Stool Softener 100 MG capsule  Generic drug: docusate sodium   100 mg, Oral, 2 Times Daily             Continue These Medications        Instructions Start Date   doxylamine 25 MG tablet  Commonly known as: UNISOM   25 mg, Nightly PRN      famotidine 20 MG tablet  Commonly known as: PEPCID   20 mg, 2 Times Daily      fluticasone 50 MCG/ACT nasal spray  Commonly known as: FLONASE   2 sprays, Daily      Prenatal Vitamin 27-0.8 MG tablet   Take  by mouth.      vitamin B-6 50 MG tablet  Commonly known as: PYRIDOXINE   50 mg, Daily             Disposition:Home or Self Care  Follow up:   Future Appointments   Date Time Provider Department Center   8/12/2025  8:20 AM Eunice Hernandez MD [facility]   10/22/2025  1:30 PM [facility]       Over 30 minutes on discharge.  Counseling and coordinating care.    Eunice Hernandez MD

## 2025-07-23 NOTE — PROGRESS NOTES
Patient Name:  Char Villatoro  YOB: 1990  [id number]    [facility] Post - Operative Note    Date of visit: 7/22/2025    Subjective   Subjective: Feels OK, a bit sore. Fetal heart tones stable       Objective     Objective:    /62   Pulse 102   Temp 99.5 °F (37.5 °C) (Oral)   Resp 18   LMP 01/28/2025 (Exact Date)   SpO2 95%     CV:  Rate  regular and rhythm  regular  L:  Clear  to auscultation bilaterally   ABD:  Soft, appropriately tender. Dressings clean, dry and intact   EXT:  No cyanosis, clubbing or edema             Assessment/ Plan: Doing well after Lap appendectomy. Continue Pulmonary toilet    Problem List Items Addressed This Visit    None  Visit Diagnoses         Appendicitis        Relevant Orders    Tissue Pathology Exam             Active Hospital Problems    Diagnosis  POA    **Acute appendicitis with localized peritonitis, without perforation, abscess, or gangrene [K35.30]  Yes      Resolved Hospital Problems   No resolved problems to display.            Kentrell Dawkins MD  7/22/2025  21:43 EDT

## 2025-07-23 NOTE — PAYOR COMM NOTE
"IrvingChar (34 y.o. Female)     From:Angelica Rodríguez LPN, Utilization Review  Phone #[phone]  Fax #[phone]    Ref#[id number]           Date of Birth   1990    Social Security Number       Address   [address]  [place] [id number]    Home Phone   [phone]    MRN   [id number]       Yazidism   Rastafari    Marital Status                               Admission Date   7/21/2025    Admission Type   Emergency    Admitting Provider   Eunice Hernandez MD    Attending Provider       Department, Room/Bed   [facility] ANTEPARTUM, N326/1       Discharge Date   7/23/2025    Discharge Disposition   Home or Self Care    Discharge Destination                                 Attending Provider: (none)   Allergies: No Known Allergies    Isolation: None   Infection: None   Code Status: CPR    Ht: 172.7 cm (68\")   Wt: 114 kg (252 lb)    Admission Cmt: None   Principal Problem: Acute appendicitis with localized peritonitis, without perforation, abscess, or gangrene [K35.30]                   Active Insurance as of 7/21/2025       Primary Coverage       Payor Plan Insurance Group Employer/Plan Group    ANTHEM BLUE CROSS ANTHEM BLUE CROSS BLUE SHIELD PPO [id number]       Payor Plan Address Payor Plan Phone Number Payor Plan Fax Number Effective Dates    [address] [phone]  1/1/2025 - None Entered    [facility] [id number]         Subscriber Name Subscriber Birth Date Member ID       GUNJAN VILLATORO 1990 [id number]                     Emergency Contacts        (Rel.) Home Phone Work Phone Mobile Phone    GUNJAN VILLATORO (Spouse) -- -- [phone]              [place]: NPI [id number] Tax ID [id number]  Insurance Information                  ANTHEM BLUE CROSS/ANTHEM BLUE CROSS BLUE SHIELD PPO Phone: [phone]    Subscriber: Gunjan Villatoro Subscriber#: [id number]    Group#: [id number] Precert#: [id number]    Authorization#: -- Effective Date: --           " "  History & Physical        Santana Diaz MD at 25 1815          [facility]  Obstetric History and Physical    Referring Provider: Eunice Hernandez MD      Chief Complaint   Patient presents with    Back Pain    Pelvic Pain       HPI:    Patient is a 34 y.o. female  currently at 25w3d, who presents with a three day history of RLQ abdominal pain and right sided flank pain.  She say she has no appetite  and has not kept anything down since last night.   She say she does not feel nausea nor has she vomited just no desire to eat.   Denies fever or chills.   Her pain had been intermittent, but today she says it is more of a constant pain.  She say sit feels like a pulling especially when she goes from lying down or sitting down to standing.  She denies feeling contractions or cramping.    +FM.    Denies vaginal leaking and bleeding     After IV hydration, her pain has not improved and is actually worse and brought her to tears.             Prenatal Information:   Maternal Prenatal Labs  Blood Type No results found for: \"ABO\"   Rh Status No results found for: \"RH\"   Antibody Screen No results found for: \"ABSCRN\"   Gonnorhea No results found for: \"GCCX\"   Chlamydia No results found for: \"CLAMYDCU\"   RPR No results found for: \"RPR\"   Syphilis Antibody No results found for: \"SYPHILIS\"   Rubella No results found for: \"RUBELLAIGGIN\"   Hepatitis B Surface Antigen No results found for: \"HEPBSAG\"   HIV-1 Antibody No results found for: \"LABHIV1\"   Hepatitis C Antibody No results found for: \"HEPCAB\"   Rapid Urin Drug Screen No results found for: \"AMPMETHU\", \"BARBITSCNUR\", \"LABBENZSCN\", \"LABMETHSCN\", \"LABOPIASCN\", \"THCURSCR\", \"COCAINEUR\", \"AMPHETSCREEN\", \"PROPOXSCN\", \"BUPRENORSCNU\", \"METAMPSCNUR\", \"OXYCODONESCN\", \"TRICYCLICSCN\"   Group B Strep Culture No results found for: \"GBSANTIGEN\"           External Prenatal Results       Pregnancy Outside Results - Transcribed From Office Records - See Scanned Records For " Details       Test Value Date Time    ABO  A  25 1139    Rh  Positive  25 1139    Antibody Screen  Negative  07/15/25 1005       Negative  25 1139    Varicella IgG       Rubella  2.24 index 25 1139    Hgb  12.0 g/dL 07/15/25 1005       13.9 g/dL 25 1139    Hct  35.0 % 07/15/25 1005       42.0 % 25 1139    HgB A1c        1h GTT  102 mg/dL 07/15/25 1005    3h GTT Fasting       3h GTT 1 hour       3h GTT 2 hour       3h GTT 3 hour        Gonorrhea (discrete)  Negative  25 1139    Chlamydia (discrete)  Negative  25 1139    RPR  Non Reactive  07/15/25 1005       Non Reactive  25 1139    Syphils cascade: TP-Ab (FTA)       TP-Ab       TP-Ab (EIA)       TPPA       HBsAg  Negative  25 1139    Herpes Simplex Virus PCR       Herpes Simplex VIrus Culture       HIV  Non Reactive  25 1139    Hep C RNA Quant PCR       Hep C Antibody  Non Reactive  25 1139    AFP       NIPT       Cystic Fibrosis (Carolyn)       Cystic Fibroisis        Spinal Muscular atrophy       Fragile X       Group B Strep       GBS Susceptibility to Clindamycin       GBS Susceptibility to Erythromycin       Fetal Fibronectin       Genetic Testing, Maternal Blood                 Drug Screening       Test Value Date Time    Urine Drug Screen       Amphetamine Screen  Negative ng/mL 25 1139    Barbiturate Screen  Negative ng/mL 25 1139    Benzodiazepine Screen  Negative ng/mL 25 1139    Methadone Screen  Negative ng/mL 25 1139    Phencyclidine Screen  Negative ng/mL 25 1139    Opiates Screen       THC Screen       Cocaine Screen       Propoxyphene Screen  Negative ng/mL 25 1139    Buprenorphine Screen       Methamphetamine Screen       Oxycodone Screen       Tricyclic Antidepressants Screen                 Legend    ^: Historical                              Past OB History:       OB History    Para Term  AB Living   2 1 1 0 0 1   SAB IAB  Ectopic Molar Multiple Live Births   0 0 0 0 0 1      # Outcome Date GA Lbr Meir/2nd Weight Sex Type Anes PTL Lv   2 Current            1 Term 25 39w2d  4211 g (9 lb 4.5 oz) F CS-LTranv EPI N SUZE      Complications: Failure to Progress in First Stage      Name: BEN GREGORY      Apgar1: 8  Apgar5: 9       Past Medical History: Past Medical History:   Diagnosis Date    Abnormal Pap smear of cervix     Disease of thyroid gland     H/O cold sores       Past Surgical History Past Surgical History:   Procedure Laterality Date    CERVICAL BIOPSY  W/ LOOP ELECTRODE EXCISION  2018     SECTION N/A 3/31/2023    Procedure:  SECTION PRIMARY;  Surgeon: Yuridia Lu MD;  Location: [facility] LABOR DELIVERY;  Service: Obstetrics/Gynecology;  Laterality: N/A;    LEEP  2018    WISDOM TOOTH EXTRACTION  2019      Family History: Family History   Problem Relation Age of Onset    Breast cancer Maternal Aunt 43    Ovarian cancer Neg Hx     Uterine cancer Neg Hx     Colon cancer Neg Hx       Social History:  reports that she has never smoked. She has never used smokeless tobacco.   reports no history of alcohol use.   reports no history of drug use.                   General ROS Negative Findings:  All pertinent positives and negatives are addressed in the HPI        Vital Signs Range for the last 24 hours  Temperature: Temp:  [98.7 °F (37.1 °C)] 98.7 °F (37.1 °C)   Temp Source: Temp src: Oral   BP: BP: (111-117)/(66-72) 111/72   Pulse: Heart Rate:  [91] 91   Respirations: Resp:  [16] 16   SPO2: SpO2:  [95 %] 95 %   O2 Amount (l/min):     O2 Devices     Weight:       Physical Examination:   General:   alert, appears stated age, and cooperative            Chest: CTAB    Heart:   Regular rate    Abdominal  Soft, non-tender,  gravid uterus, mild RLQ pain tenderness with palpation .  No rebound tenderness.    Lower Extremities: +1   Back Mild right sided CVA tenderness                      Fetal Heart Rate Assessment    Method: Fetal HR Assessment Method: external   Beats/min: Fetal HR (beats/min): 150   Baseline: Fetal HR Baseline: normal [id number]   Varibility: Fetal HR Variability: moderate (amplitude range 6 to 25 bpm)mod   Accels: Fetal HR Accelerations: greater than/equal to 15 bpm, lasting at least 15 secondsy   Decels: Fetal HR Decelerations: absentn   Tracing Category:  1     Uterine Assessment   Method: Method: external tocotransducer   Frequency (min):  None    Ctx Count in 10 min:     Duration:     Intensity: Contraction Intensity: no contractions   Intensity by IUPC:     Resting Tone: Uterine Resting Tone: soft by palpation   Resting Tone by IUPC:     [place] Units:       Laboratory Results:   Lab Results (last 24 hours)       Procedure Component Value Units Date/Time    CBC & Differential [[id number]]  (Abnormal) Collected: 07/21/25 1832    Specimen: Blood Updated: 07/21/25 1846    Narrative:      The following orders were created for panel order CBC & Differential.  Procedure                               Abnormality         Status                     ---------                               -----------         ------                     CBC Auto Differential[[id number]]        Abnormal            Final result                 Please view results for these tests on the individual orders.    CBC Auto Differential [[id number]]  (Abnormal) Collected: 07/21/25 1832    Specimen: Blood Updated: 07/21/25 1846     WBC 13.87 10*3/mm3      RBC 3.93 10*6/mm3      Hemoglobin 11.5 g/dL      Hematocrit 35.0 %      MCV 89.1 fL      MCH 29.3 pg      MCHC 32.9 g/dL      RDW 13.0 %      RDW-SD 42.1 fl      MPV 10.3 fL      Platelets 189 10*3/mm3      Neutrophil % 80.9 %      Lymphocyte % 11.6 %      Monocyte % 6.9 %      Eosinophil % 0.1 %      Basophil % 0.1 %      Immature Grans % 0.4 %      Neutrophils, Absolute 11.21 10*3/mm3      Lymphocytes, Absolute 1.61 10*3/mm3      Monocytes, Absolute 0.96 10*3/mm3      Eosinophils, Absolute  0.02 10*3/mm3      Basophils, Absolute 0.01 10*3/mm3      Immature Grans, Absolute 0.06 10*3/mm3      nRBC 0.0 /100 WBC     Urinalysis With Microscopic If Indicated (No Culture) - Urine, Clean Catch [[id number]]  (Abnormal) Collected: 07/21/25 1713    Specimen: Urine, Clean Catch Updated: 07/21/25 1729     Color, UA Dark Yellow     Appearance, UA Cloudy     pH, UA 6.5     Specific Gravity, UA >1.030     Glucose, UA Negative     Ketones, UA >=160 mg/dL (4+)     Bilirubin, UA Negative     Blood, UA Negative     Protein, UA Trace     Leuk Esterase, UA Trace     Nitrite, UA Positive     Urobilinogen, UA 1.0 E.U./dL    Urinalysis, Microscopic Only - Urine, Clean Catch [[id number]]  (Abnormal) Collected: 07/21/25 1713    Specimen: Urine, Clean Catch Updated: 07/21/25 1729     RBC, UA 0-2 /HPF      WBC, UA 11-20 /HPF      Bacteria, UA 4+ /HPF      Squamous Epithelial Cells, UA 3-6 /HPF      Hyaline Casts, UA None Seen /LPF      Methodology Automated Microscopy          Radiology Review:   Imaging Results (Last 24 Hours)       ** No results found for the last 24 hours. **          Other Studies:      Assessment & Plan            SALLIE Course / Assessment  1.   All lab and imaging results listed above have been reviewed by me.  2.   Intrauterine pregnancy at 25w3d gestation with reactive fetal status.    3.   UTI  -   4.   Worsening RLQ pain    Plan:     Admit to APU  CT Abd/Pelvis   Dilaudid 1 mg Q 2 hr prn   NPO until CT read  TID 1 hour fetal monitoring   Continue IV fluids and Rocephin Q 24 hrs             Santana Diaz MD  7/21/2025  19:27 EDT    Electronically signed by Santana Diaz MD at 07/21/25 6583       Vital Signs (last 2 days) before discharge       Date/Time Temp Temp src Pulse Resp BP Patient Position SpO2    07/23/25 1134 97.7 (36.5) Oral 98 16 108/61 Lying --    07/23/25 0753 98.9 (37.2) Oral 88 16 106/60 Sitting --    07/23/25 0345 98.1 (36.7) Oral 95 16 110/60 Lying --    07/23/25 0004 98.5 (36.9) Oral  99 16 109/56 Lying --    07/22/25 2230 99 (37.2) Oral 95 16 107/58 -- 95    07/22/25 2000 99.5 (37.5) Oral 102 18 117/62 -- 95    07/22/25 1945 97.5 (36.4) Temporal 102 16 116/71 -- 92    07/22/25 1930 97.6 (36.4) Temporal 105 16 118/70 -- 93    07/22/25 1915 97.7 (36.5) Temporal 106 16 119/72 Lying 97    07/22/25 1910 97.5 (36.4) Temporal 111 16 118/68 Lying 97    07/22/25 1905 97.7 (36.5) Temporal 113 16 119/67 Lying 96    07/22/25 1901 97.7 (36.5) Temporal 114 16 94/49 Lying 95    07/22/25 1446 97.7 (36.5) Temporal 92 15 111/70 Lying 96    07/22/25 1419 -- -- -- -- -- -- --    Comment rows:    OBSERV: Patient off unit, going Pre-Op for appendectomy at 07/22/25 1419    07/22/25 1242 98.9 (37.2) Oral 92 14 109/58 Lying 97    07/22/25 1232 -- -- -- -- -- -- --    Comment rows:    OBSERV: Patient back from MRI at 07/22/25 1232    07/22/25 1110 -- -- -- -- -- -- --    Comment rows:    OBSERV: Transport at bedside to take patient down for MRI at 07/22/25 1110    07/22/25 0757 98.6 (37) Oral 93  16 109/66  Lying --    Pulse: Simultaneous filing. User may be unaware of other data. at 07/22/25 0757    BP: Simultaneous filing. User may be unaware of other data. at 07/22/25 0757    07/22/25 0753 -- -- 92 -- -- -- 98    07/22/25 0435 98.1 (36.7) Oral 90 18 109/59 Lying --    07/22/25 0344 -- -- -- 16 -- -- --    07/22/25 0040 98.6 (37) Oral 90 18 110/58 Lying --    07/21/25 1805 -- -- -- -- -- -- --    Comment rows:    OBSERV: Called Dr Prado updated on UA. He said he would review and place orders and pt can come off monitor at 07/21/25 1805 07/21/25 1700 -- -- -- -- 111/72 -- --    07/21/25 1646 98.7 (37.1) Oral 91 16 117/66 -- 95          Facility-Administered Medications as of 7/23/2025   Medication Dose Route Frequency Provider Last Rate Last Admin    acetaminophen (TYLENOL) tablet 650 mg  650 mg Oral Q4H PRN Kentrell Dawkins MD   650 mg at 07/23/25 0807    bisacodyl (DULCOLAX) EC tablet 10 mg  10 mg Oral Daily  Kentrell Dawkins MD   10 mg at 25 0807    bisacodyl (DULCOLAX) suppository 10 mg  10 mg Rectal Daily PRN Kentrell Dawkins MD        [COMPLETED] cefTRIAXone (ROCEPHIN) 1,000 mg in sodium chloride 0.9 % 100 mL MBP  1,000 mg Intravenous Once Santana Diaz  mL/hr at 25 1830 1,000 mg at 25 1830    cyclobenzaprine (FLEXERIL) tablet 5 mg  5 mg Oral TID PRN Kentrell Dawkins MD   5 mg at 25 1241    docusate sodium (COLACE) capsule 100 mg  100 mg Oral BID PRN Kentrell Dawkins MD        docusate sodium (COLACE) capsule 100 mg  100 mg Oral BID PRN Kentrell Dawkins MD        famotidine (PEPCID) injection 20 mg  20 mg Intravenous Q12H Kentrell Dawkins MD   20 mg at 25    [COMPLETED] famotidine (PEPCID) injection 20 mg  20 mg Intravenous 60 Min Pre-Op Romaine Kahn MD   20 mg at 25 1450    fluticasone (FLONASE) 50 MCG/ACT nasal spray 2 spray  2 spray Nasal Daily Kentrell Dawkins MD        HYDROmorphone (DILAUDID) injection 0.2 mg  0.2 mg Intravenous Q2H PRN Kentrell Dawkins MD   0.2 mg at 25    And    naloxone (NARCAN) injection 0.1 mg  0.1 mg Intravenous Q5 Min PRN Kentrell Dawkins MD        HYDROmorphone (DILAUDID) injection 1 mg  1 mg Intravenous Q2H PRN Kentrell Dawkins MD   1 mg at 25 1241    [] lactated ringers infusion  125 mL/hr Intravenous Continuous Lamar Ji  mL/hr at 25 0246 Restarted at 25 1749    [COMPLETED] lactated ringers infusion  9 mL/hr Intravenous Once Romaine Kahn MD 9 mL/hr at 25 1451 9 mL/hr at 25 1451    lactated ringers infusion  100 mL/hr Intravenous Continuous Kentrell Dawkins  mL/hr at 25 0815 100 mL/hr at 25 0815    lidocaine PF 1% (XYLOCAINE) injection 0.5 mL  0.5 mL Intradermal Once PRN Kentrell Dawkins MD        ondansetron (ZOFRAN) injection 4 mg  4 mg Intravenous Q6H PRN Kentrell Dawkins MD   4 mg at 25 0231    ondansetron ODT  (ZOFRAN-ODT) disintegrating tablet 4 mg  4 mg Oral Q6H PRN Kentrell Dawkins MD        Or    ondansetron (ZOFRAN) injection 4 mg  4 mg Intravenous Q6H PRN Kentrell Dawkins MD        ondansetron ODT (ZOFRAN-ODT) disintegrating tablet 4 mg  4 mg Translingual Q6H PRN Kentrell Dawkins MD   4 mg at 07/21/25 1709    oxyCODONE-acetaminophen (PERCOCET) 5-325 MG per tablet 1 tablet  1 tablet Oral Q4H PRN Kentrell Dawkins MD   1 tablet at 07/23/25 0354    pantoprazole (PROTONIX) EC tablet 40 mg  40 mg Oral Q AM Kentrell Dawkins MD   40 mg at 07/23/25 0630    [COMPLETED] piperacillin-tazobactam (ZOSYN) 3.375 g IVPB in 100 mL NS MBP (CD)  3.375 g Intravenous Once Kentrell Dawkins MD   3.375 g at 07/22/25 1800    piperacillin-tazobactam (ZOSYN) 3.375 g IVPB in 100 mL NS MBP (CD)  3.375 g Intravenous Q8H Kentrell Dawkins MD   3.375 g at 07/23/25 0808    prenatal vitamin tablet 1 tablet  1 tablet Oral Daily Kentrell Dawkins MD   1 tablet at 07/23/25 0807    simethicone (MYLICON) chewable tablet 80 mg  80 mg Oral 4x Daily PRN Kentrell Dawkins MD        [COMPLETED] sodium chloride 0.45 % bolus 1,000 mL  1,000 mL Intravenous Once Santana Diaz MD 1,000 mL/hr at 07/21/25 1832 1,000 mL at 07/21/25 1832    sodium chloride 0.9 % flush 10 mL  10 mL Intravenous Q12H Kentrell Dawkins MD        sodium chloride 0.9 % flush 10 mL  10 mL Intravenous PRN Kentrell Dawkins MD        sodium chloride 0.9 % flush 10 mL  10 mL Intravenous Q12H Kentrell Dawkins MD   10 mL at 07/21/25 2059    sodium chloride 0.9 % flush 10 mL  10 mL Intravenous PRN Kentrell Dawkins MD        sodium chloride 0.9 % infusion 40 mL  40 mL Intravenous PRN Kentrell Dawkins MD        vitamin B-6 (PYRIDOXINE) tablet 50 mg  50 mg Oral Daily Kentrell Dawkins MD   50 mg at 07/23/25 0807     Lab Results (all)       Procedure Component Value Units Date/Time    Tissue Pathology Exam [[id number]] Collected: 07/22/25 1829    Specimen: Tissue from Large  Intestine, Appendix Updated: 07/23/25 0739    Basic Metabolic Panel [[id number]]  (Abnormal) Collected: 07/23/25 0525    Specimen: Blood Updated: 07/23/25 0559     Glucose 113 mg/dL      BUN 4.1 mg/dL      Creatinine 0.55 mg/dL      Sodium 135 mmol/L      Potassium 4.0 mmol/L      Chloride 108 mmol/L      CO2 17.5 mmol/L      Calcium 8.0 mg/dL      BUN/Creatinine Ratio 7.5     Anion Gap 9.5 mmol/L      eGFR 125.1 mL/min/1.73     Narrative:      GFR Categories in Chronic Kidney Disease (CKD)              GFR Category          GFR (mL/min/1.73)    Interpretation  G1                    90 or greater        Normal or high (1)  G2                    60-89                Mild decrease (1)  G3a                   45-59                Mild to moderate decrease  G3b                   30-44                Moderate to severe decrease  G4                    15-29                Severe decrease  G5                    14 or less           Kidney failure    (1)In the absence of evidence of kidney disease, neither GFR category G1 or G2 fulfill the criteria for CKD.    eGFR calculation 2021 CKD-EPI creatinine equation, which does not include race as a factor    CBC & Differential [[id number]]  (Abnormal) Collected: 07/23/25 0525    Specimen: Blood Updated: 07/23/25 0554    Narrative:      The following orders were created for panel order CBC & Differential.  Procedure                               Abnormality         Status                     ---------                               -----------         ------                     CBC Auto Differential[[id number]]        Abnormal            Final result                 Please view results for these tests on the individual orders.    CBC Auto Differential [[id number]]  (Abnormal) Collected: 07/23/25 0525    Specimen: Blood Updated: 07/23/25 0554     WBC 11.08 10*3/mm3      RBC 3.57 10*6/mm3      Hemoglobin 10.6 g/dL      Hematocrit 31.9 %      MCV 89.4 fL      MCH 29.7 pg      MCHC 33.2  g/dL      RDW 12.9 %      RDW-SD 42.5 fl      MPV 10.0 fL      Platelets 189 10*3/mm3      Neutrophil % 80.8 %      Lymphocyte % 10.2 %      Monocyte % 8.2 %      Eosinophil % 0.1 %      Basophil % 0.2 %      Immature Grans % 0.5 %      Neutrophils, Absolute 8.96 10*3/mm3      Lymphocytes, Absolute 1.13 10*3/mm3      Monocytes, Absolute 0.91 10*3/mm3      Eosinophils, Absolute 0.01 10*3/mm3      Basophils, Absolute 0.02 10*3/mm3      Immature Grans, Absolute 0.05 10*3/mm3      nRBC 0.0 /100 WBC     CBC & Differential [[id number]]  (Abnormal) Collected: 07/22/25 1016    Specimen: Blood Updated: 07/22/25 1039    Narrative:      The following orders were created for panel order CBC & Differential.  Procedure                               Abnormality         Status                     ---------                               -----------         ------                     CBC Auto Differential[[id number]]        Abnormal            Final result                 Please view results for these tests on the individual orders.    CBC Auto Differential [[id number]]  (Abnormal) Collected: 07/22/25 1016    Specimen: Blood Updated: 07/22/25 1039     WBC 13.07 10*3/mm3      RBC 3.70 10*6/mm3      Hemoglobin 10.9 g/dL      Hematocrit 32.6 %      MCV 88.1 fL      MCH 29.5 pg      MCHC 33.4 g/dL      RDW 12.9 %      RDW-SD 41.9 fl      MPV 10.2 fL      Platelets 174 10*3/mm3      Neutrophil % 81.1 %      Lymphocyte % 10.2 %      Monocyte % 7.8 %      Eosinophil % 0.2 %      Basophil % 0.2 %      Immature Grans % 0.5 %      Neutrophils, Absolute 10.62 10*3/mm3      Lymphocytes, Absolute 1.33 10*3/mm3      Monocytes, Absolute 1.02 10*3/mm3      Eosinophils, Absolute 0.02 10*3/mm3      Basophils, Absolute 0.02 10*3/mm3      Immature Grans, Absolute 0.06 10*3/mm3      nRBC 0.0 /100 WBC     CBC & Differential [[id number]]  (Abnormal) Collected: 07/21/25 1832    Specimen: Blood Updated: 07/21/25 1846    Narrative:      The following orders were  created for panel order CBC & Differential.  Procedure                               Abnormality         Status                     ---------                               -----------         ------                     CBC Auto Differential[[id number]]        Abnormal            Final result                 Please view results for these tests on the individual orders.    CBC Auto Differential [[id number]]  (Abnormal) Collected: 07/21/25 1832    Specimen: Blood Updated: 07/21/25 1846     WBC 13.87 10*3/mm3      RBC 3.93 10*6/mm3      Hemoglobin 11.5 g/dL      Hematocrit 35.0 %      MCV 89.1 fL      MCH 29.3 pg      MCHC 32.9 g/dL      RDW 13.0 %      RDW-SD 42.1 fl      MPV 10.3 fL      Platelets 189 10*3/mm3      Neutrophil % 80.9 %      Lymphocyte % 11.6 %      Monocyte % 6.9 %      Eosinophil % 0.1 %      Basophil % 0.1 %      Immature Grans % 0.4 %      Neutrophils, Absolute 11.21 10*3/mm3      Lymphocytes, Absolute 1.61 10*3/mm3      Monocytes, Absolute 0.96 10*3/mm3      Eosinophils, Absolute 0.02 10*3/mm3      Basophils, Absolute 0.01 10*3/mm3      Immature Grans, Absolute 0.06 10*3/mm3      nRBC 0.0 /100 WBC     Urinalysis With Microscopic If Indicated (No Culture) - Urine, Clean Catch [[id number]]  (Abnormal) Collected: 07/21/25 1713    Specimen: Urine, Clean Catch Updated: 07/21/25 1729     Color, UA Dark Yellow     Appearance, UA Cloudy     pH, UA 6.5     Specific Gravity, UA >1.030     Glucose, UA Negative     Ketones, UA >=160 mg/dL (4+)     Bilirubin, UA Negative     Blood, UA Negative     Protein, UA Trace     Leuk Esterase, UA Trace     Nitrite, UA Positive     Urobilinogen, UA 1.0 E.U./dL    Urinalysis, Microscopic Only - Urine, Clean Catch [[id number]]  (Abnormal) Collected: 07/21/25 1713    Specimen: Urine, Clean Catch Updated: 07/21/25 1729     RBC, UA 0-2 /HPF      WBC, UA 11-20 /HPF      Bacteria, UA 4+ /HPF      Squamous Epithelial Cells, UA 3-6 /HPF      Hyaline Casts, UA None Seen /LPF       Methodology Automated Microscopy          Imaging Results (All)       Procedure Component Value Units Date/Time    MRI Abdomen Without Contrast [[id number]] Collected: 07/22/25 1254     Updated: 07/22/25 1303    Narrative:      MRI ABDOMEN WO CONTRAST    Date of Exam: 7/22/2025 11:21 AM EDT    Indication: Right lower quadrant abdominal pain.     Comparison: CT abdomen pelvis dated 7/21/2025    Technique:  Routine multiplanar/multisequence images of the abdomen were obtained without contrast administration.      Findings:  Note: Examination optimized for evaluation of appendicitis. Limited assessment of the fetus and gravid uterus. The placenta is posterior and fundal. Fetal presentation is breech.    There is an abnormal segment of mid and distal appendix with wall thickening and dilation measuring up to 10 mm in maximal diameter. There is surrounding inflammatory changes within the right lower quadrant mesenteric fat. There is an appendicolith near   the tip of the appendix. The base appears relatively normal. There is no evidence of perforation or abscess.    The gallbladder is present without wall thickening. There is no intrahepatic or extrahepatic biliary ductal dilatation.      Impression:      Impression:  Abnormal dilation of the mid and distal appendix measuring up to 10 mm in maximal diameter with appendicolith at the tip. There is surrounding inflammatory changes within the right lower quadrant mesenteric fat. Findings most likely reflect early   appendicitis. There is no evidence of perforation or abscess.    Surgical consultation is recommended.        Electronically Signed: Navid Marin MD    7/22/2025 1:00 PM EDT    Workstation ID: [id number]    CT Abdomen Pelvis Without Contrast [[id number]] Collected: 07/21/25 2031     Updated: 07/21/25 2039    Narrative:      CT ABDOMEN PELVIS WO CONTRAST    Date of Exam: 7/21/2025 8:00 PM EDT    Indication: RLQ pain.    Comparison: None available.    Technique:  Axial CT images were obtained of the abdomen and pelvis without the administration of contrast. Reconstructed coronal and sagittal images were also obtained. Automated exposure control and iterative construction methods were used.      Findings:  Included Chest: Bibasal atelectasis  Liver: No significant abnormality.  Gallbladder and biliary tree: No significant abnormality.  Spleen: No significant abnormality.  Pancreas: No significant abnormality.  Adrenal glands: No significant abnormality.  Kidneys and ureters: Minimal bilateral hydronephrosis, which may be physiologic in setting of pregnancy. No radiopaque calculi seen.  Stomach and duodenum:No significant abnormality.  Small and large bowel: Appendicolith in an otherwise normal-appearing appendix. No evidence of bowel obstruction. No significant pericolonic inflammatory changes seen.  Peritoneal cavity: No free fluid or free air.  Bladder: No significant abnormality.  Pelvic organs: Intrauterine gestation.  Vasculature: No significant abnormality  Lymph nodes: No pathologic appearing lymph nodes by imaging criteria.  Bones and soft tissues: Mild degenerative changes of the lower lumbar spine. No acute osseous abnormality.      Impression:      Impression:  No acute findings in the abdomen or pelvis.    Minimal bilateral hydronephrosis, which may be physiologic in setting of pregnancy. No radiopaque calculi seen.    Electronically Signed: Wilfredo Lowe MD    7/21/2025 8:36 PM EDT    Workstation ID: [id number]          Orders (all)        Start     Ordered    07/23/25 1030  Discharge to Care of  When Patient Meets Criteria  Once         07/23/25 1031    07/23/25 1029  Discharge patient  Once         07/23/25 1031    07/23/25 0900  bisacodyl (DULCOLAX) EC tablet 10 mg  Daily         07/22/25 2114    07/23/25 0758  Diet: Regular/House; Fluid Consistency: Thin (IDDSI 0)  Diet Effective Now         07/23/25 0757    07/23/25 0600  Basic Metabolic Panel   Morning Draw         07/22/25 2114 07/23/25 0600  CBC & Differential  Morning Draw         07/22/25 2114 07/23/25 0600  pantoprazole (PROTONIX) EC tablet 40 mg  Every Early Morning         07/22/25 2114 07/23/25 0600  CBC Auto Differential  PROCEDURE ONCE         07/22/25 2202 07/23/25 0000  piperacillin-tazobactam (ZOSYN) 3.375 g IVPB in 100 mL NS MBP (CD)  Every 8 Hours         07/22/25 1426    07/23/25 0000  ondansetron (ZOFRAN) 4 MG tablet  Every 8 Hours PRN         07/23/25 0815    07/23/25 0000  oxyCODONE-acetaminophen (PERCOCET) 5-325 MG per tablet  Every 4 Hours PRN,   Status:  Discontinued         07/23/25 0815    07/23/25 0000  docusate sodium (COLACE) 100 MG capsule  2 Times Daily         07/23/25 0815    07/23/25 0000  Discharge Follow-up with Specialty: Dr. Dawkins; 1 Month         07/23/25 0815    07/23/25 0000  amoxicillin-clavulanate (Augmentin) 500-125 MG per tablet  3 Times Daily         07/23/25 0815    07/23/25 0000  oxyCODONE (ROXICODONE) 5 MG immediate release tablet  Every 6 Hours PRN         07/23/25 1031    07/22/25 2200  Strict Intake and Output  Every Hour       07/22/25 2114 07/22/25 2200  Incentive Spirometry  Every Hour While Awake       07/22/25 2114 07/22/25 2200  lactated ringers infusion  Continuous         07/22/25 2114 07/22/25 2115  Code Status and Medical Interventions: CPR (Attempt to Resuscitate); Full Support  Continuous         07/22/25 2114 07/22/25 2115  Snack: Chewing Gum to Increase Saliva Flow & Provide Gas Pain Relief  Once        Comments: Chewing Gum to Increase Saliva Flow & Provide Gas Pain Relief    07/22/25 2114 07/22/25 2115  Turn, Cough & Deep Breathe  Once         07/22/25 2114 07/22/25 2115  Activity - Ad Linda  Until Discontinued         07/22/25 2114 07/22/25 2115  Continuous Pulse Oximetry  Continuous         07/22/25 2114 07/22/25 2115  Antibiotic Previously Ordered  Once         07/22/25 2114 07/22/25 2115  Diet:  "Liquid; Clear Liquid; Fluid Consistency: Thin (IDDSI 0)  Diet Effective Now,   Status:  Canceled         07/22/25 2114 07/22/25 2115  Advance Diet As Tolerated -  Until Discontinued         07/22/25 2114 07/22/25 2114  oxyCODONE-acetaminophen (PERCOCET) 5-325 MG per tablet 1 tablet  Every 4 Hours PRN         07/22/25 2114 07/22/25 2114  HYDROmorphone (DILAUDID) injection 0.2 mg  Every 2 Hours PRN        Placed in \"And\" Linked Group    07/22/25 2114 07/22/25 2114  naloxone (NARCAN) injection 0.1 mg  Every 5 Minutes PRN        Placed in \"And\" Linked Group    07/22/25 2114 07/22/25 2114  ondansetron ODT (ZOFRAN-ODT) disintegrating tablet 4 mg  Every 6 Hours PRN        Placed in \"Or\" Linked Group    07/22/25 2114 07/22/25 2114  ondansetron (ZOFRAN) injection 4 mg  Every 6 Hours PRN        Placed in \"Or\" Linked Group    07/22/25 2114 07/22/25 2114  docusate sodium (COLACE) capsule 100 mg  2 Times Daily PRN         07/22/25 2114 07/22/25 2100  sodium chloride 0.9 % flush 3 mL  Every 12 Hours Scheduled,   Status:  Discontinued         07/22/25 1857 07/22/25 2100  famotidine (PEPCID) tablet 20 mg  2 Times Daily,   Status:  Discontinued         07/22/25 1903 07/22/25 2030  prenatal vitamin tablet 1 tablet  Daily         07/22/25 1903 07/22/25 2030  fluticasone (FLONASE) 50 MCG/ACT nasal spray 2 spray  Daily         07/22/25 1903 07/22/25 2030  vitamin B-6 (PYRIDOXINE) tablet 50 mg  Daily         07/22/25 1903 07/22/25 1903  simethicone (MYLICON) chewable tablet 80 mg  4 Times Daily PRN         07/22/25 1903 07/22/25 1903  Notify Provider  Until Discontinued         07/22/25 1903 07/22/25 1903  Vital Signs  Per Hospital Policy/Protocol         07/22/25 1903 07/22/25 1859  lactated ringers infusion  Continuous,   Status:  Discontinued         07/22/25 1857 07/22/25 1858  Maintain Sequential Compression Device  Continuous         07/22/25 1857 07/22/25 1858  Oxygen Therapy- " Nasal Cannula; 2 LPM  Continuous         07/22/25 1857 07/22/25 1858  Continuous Pulse Oximetry  Continuous,   Status:  Canceled         07/22/25 1857 07/22/25 1858  Vital signs every 5 minutes for 15 minutes, every 15 minutes thereafter.  Once         07/22/25 1857 07/22/25 1858  Instruct Patient or Nursing Staff to Remove Scopolamine Patch 24 Hours After Application  Once        Comments: May remove sooner is side effects    07/22/25 1857 07/22/25 1858  Call Anesthesiologist for additional IV Fluid bolus for Hypotension/Tachycardia  Continuous         07/22/25 1857 07/22/25 1858  Discontinue Arterial Line - Recovery RN  to discontinue arterial line if the patient is going to a non critical care floor.  Once        Comments: Recovery RN  to discontinue arterial line if the patient is going to a non critical care floor.    07/22/25 1857 07/22/25 1858  Notify Anesthesia of Any Acute Changes in Patient Condition  Until Discontinued         07/22/25 1857 07/22/25 1858  Notify Anesthesia for Unrelieved Pain  Until Discontinued         07/22/25 1857 07/22/25 1858  Insert Peripheral IV  Once         07/22/25 1857 07/22/25 1858  Saline Lock & Maintain IV Access  Continuous         07/22/25 1857 07/22/25 1858  Once DC criteria to floor met, follow surgeon's orders.  Until Discontinued         07/22/25 1857 07/22/25 1858  Discharge patient from PACU when discharge criteria is met.  Until Discontinued         07/22/25 1857 07/22/25 1857  sodium chloride 0.9 % flush 3-10 mL  As Needed,   Status:  Discontinued         07/22/25 1857 07/22/25 1857  sodium chloride 0.9 % infusion 9 mL  As Needed,   Status:  Discontinued         07/22/25 1857 07/22/25 1857  lactated ringers bolus 250 mL  Once As Needed,   Status:  Discontinued         07/22/25 1857 07/22/25 1857  HYDROmorphone (DILAUDID) injection 0.5 mg  Every 10 Minutes PRN,   Status:  Discontinued         07/22/25 1857 07/22/25  "1857  HYDROcodone-acetaminophen (NORCO) 7.5-325 MG per tablet 1 tablet  Every 4 Hours PRN,   Status:  Discontinued         07/22/25 1857 07/22/25 1857  fentaNYL citrate (PF) (SUBLIMAZE) injection 50 mcg  Every 5 Minutes PRN,   Status:  Discontinued         07/22/25 1857 07/22/25 1857  naloxone (NARCAN) injection 0.4 mg  As Needed,   Status:  Discontinued         07/22/25 1857 07/22/25 1857  ondansetron (ZOFRAN) injection 4 mg  Once As Needed,   Status:  Discontinued         07/22/25 1857 07/22/25 1857  droperidol (INAPSINE) injection 0.625 mg  Every 15 Minutes PRN,   Status:  Discontinued        Placed in \"Or\" Linked Group    07/22/25 1857 07/22/25 1857  droperidol (INAPSINE) injection 0.625 mg  Once As Needed,   Status:  Discontinued        Placed in \"Or\" Linked Group    07/22/25 1857 07/22/25 1857  promethazine (PHENERGAN) suppository 25 mg  Once As Needed,   Status:  Discontinued        Placed in \"Or\" Linked Group    07/22/25 1857 07/22/25 1857  promethazine (PHENERGAN) tablet 25 mg  Once As Needed,   Status:  Discontinued        Placed in \"Or\" Linked Group    07/22/25 1857 07/22/25 1857  labetalol (NORMODYNE,TRANDATE) injection 5 mg  Every 5 Minutes PRN,   Status:  Discontinued         07/22/25 1857 07/22/25 1857  hydrALAZINE (APRESOLINE) injection 5 mg  Every 10 Minutes PRN,   Status:  Discontinued         07/22/25 1857 07/22/25 1857  ipratropium-albuterol (DUO-NEB) nebulizer solution 3 mL  Once As Needed,   Status:  Discontinued         07/22/25 1857 07/22/25 1857  HYDROcodone-acetaminophen (NORCO) 5-325 MG per tablet 1 tablet  Once As Needed,   Status:  Discontinued         07/22/25 1857 07/22/25 1829  Tissue Pathology Exam  RELEASE UPON ORDERING         07/22/25 1829 07/22/25 1819  bupivacaine-EPINEPHrine PF (MARCAINE w/EPI) 0.25% -[id number] injection  As Needed,   Status:  Discontinued         07/22/25 1819    07/22/25 1819  sodium chloride 0.9 % solution  As Needed,   " Status:  Discontinued         07/22/25 1819    07/22/25 1811  Please contact L+D for fetal heart tones post op in PACU  Nursing Communication  Once        Comments: Please contact L+D for fetal heart tones post op in PACU    07/22/25 1810    07/22/25 1436  lactated ringers infusion  Once         07/22/25 1434    07/22/25 1434  Continuous Pulse Oximetry  Continuous,   Status:  Canceled         07/22/25 1434    07/22/25 1434  Insert Peripheral IV  Once         07/22/25 1434    07/22/25 1434  Saline Lock & Maintain IV Access  Continuous,   Status:  Canceled         07/22/25 1434    07/22/25 1433  famotidine (PEPCID) injection 20 mg  60 Minutes Pre-Op         07/22/25 1434    07/22/25 1433  Vital Signs - Per Anesthesia Protocol  As Needed,   Status:  Canceled       07/22/25 1434    07/22/25 1433  Oxygen Therapy- Nasal Cannula; Titrate 1-6 LPM Per SpO2; 90 - 95%  Continuous PRN,   Status:  Canceled       07/22/25 1434    07/22/25 1428  piperacillin-tazobactam (ZOSYN) 3.375 g IVPB in 100 mL NS MBP (CD)  Once         07/22/25 1426    07/22/25 1425  NPO Diet NPO Type: Strict NPO, Sips with Meds  Diet Effective Now,   Status:  Canceled         07/22/25 1424    07/22/25 1425  Place Sequential Compression Device  Once         07/22/25 1424    07/22/25 1425  Patient to void in Pre-op holding  Misc Nursing Order (Specify)  Once        Comments: Patient to void in Pre-op holding    07/22/25 1424    07/22/25 1425  Obtain Informed Consent  Once         07/22/25 1424    07/22/25 1424  NPO Diet NPO Type: Strict NPO, Sips with Meds  Diet Effective Now,   Status:  Canceled         07/22/25 1423    07/22/25 1317  Inpatient General Surgery Consult  STAT        Specialty:  General Surgery  Provider:  (Not yet assigned)    07/22/25 1316    07/22/25 1023  MRI Abdomen With & Without Contrast  1 Time Imaging,   Status:  Canceled        Comments: Anorexia, Pain in RLQ    07/22/25 1023    07/22/25 1023  MRI Abdomen Without Contrast  1 Time  Imaging        Comments: Anorexia, Pain in RLQ    07/22/25 1023    07/22/25 0928  CBC & Differential  STAT         07/22/25 0927    07/22/25 0928  CBC Auto Differential  PROCEDURE ONCE         07/22/25 0927    07/22/25 0330  lactated ringers infusion  Continuous         07/22/25 0241    07/21/25 2200  famotidine (PEPCID) injection 20 mg  Every 12 Hours Scheduled         07/21/25 2114 07/21/25 2114  ondansetron (ZOFRAN) injection 4 mg  Every 6 Hours PRN         07/21/25 2114 07/21/25 2100  sodium chloride 0.9 % flush 10 mL  Every 12 Hours Scheduled         07/21/25 1804    07/21/25 2100  sodium chloride 0.9 % flush 10 mL  Every 12 Hours Scheduled         07/21/25 1926 07/21/25 2048  Diet: Regular/House; Texture: Regular (IDDSI 7); Fluid Consistency: Thin (IDDSI 0)  Diet Effective Now,   Status:  Canceled         07/21/25 2047 07/21/25 2046  HYDROmorphone (DILAUDID) injection 0.5 mg  Every 2 Hours PRN,   Status:  Discontinued         07/21/25 2047 07/21/25 2046  HYDROmorphone (DILAUDID) injection 1 mg  Every 2 Hours PRN         07/21/25 2046 07/21/25 2046  cyclobenzaprine (FLEXERIL) tablet 5 mg  3 Times Daily PRN         07/21/25 2047 07/21/25 2000  Vital Signs q 4 while awake  Every 4 Hours      Comments: While the patient is awake.    07/21/25 1926 07/21/25 1927  Weigh Patient Daily  Daily       07/21/25 1926 07/21/25 1926  Admit To Obstetrics Inpatient  Once         07/21/25 1926 07/21/25 1926  Code Status and Medical Interventions: CPR (Attempt to Resuscitate); Full Support  Continuous,   Status:  Canceled         07/21/25 1926 07/21/25 1926  Place Sequential Compression Device  Once         07/21/25 1926 07/21/25 1926  Maintain Sequential Compression Device  Continuous         07/21/25 1926 07/21/25 1926  Notify Provider (Specified)  Continuous        Comments: Open Order Report to View Parameters Requiring Provider Notification    07/21/25 1926 07/21/25 1926  Notify  Provider of Tachysystole (Per Hospital Algorithm)  Continuous        Comments: Open Order Report to View Parameters Requiring Provider Notification    25  Notify Provider if Membranes Ruptured, Bleeding Greater Than 1 Pad Per Hour, Fetal Heart Tone Abnormality or Severe Pain  Continuous        Comments: Open Order Report to View Parameters Requiring Provider Notification    25  Initiate Group Beta Strep (GBS) Prophylaxis Protocol, If Criteria Met  Continuous        Comments: NO TREATMENT RECOMMENDED IF: 1) Maternal GBS Status Known Negative 2) Scheduled  Birth With Intact Membranes, Not in Labor 3) Maternal GBS Status Unknown, No Risk Factors  TREAT WITH ANTIBIOTICS IF:  1) Maternal GBS Status Known Positive 2) Maternal GBS Status Unknown With Risk Factors: a)  Previous Infant Affected By GBS Infection b) GBS Urinary Tract Infection (UTI) or Bacteriuria During Pregnancy c) Unexplained Maternal Fever (100.4F (38C) or Greater) During Labor d)  Prolonged Rupture of Membranes (18 or More Hours) e) Gestational Age Less Than 37 Weeks    25  Insert Peripheral IV  Once         25  Saline Lock & Maintain IV Access  Continuous,   Status:  Canceled         25  NST Moderate/High risk >24 weeks:   NST (One Hour) 3 Times Daily and PRN (Antepartum)  Per Order Details        Comments: For Antepartum Patients Greater Than 24 Weeks - NST Daily & Monitor Fetal Heart Tones For One Hour 3 Times Daily & PRN.    25  NPO Diet NPO Type: Strict NPO  Diet Effective Now,   Status:  Canceled         25  sodium chloride 0.9 % flush 10 mL  As Needed         25  sodium chloride 0.9 % infusion 40 mL  As Needed         25  lidocaine PF 1% (XYLOCAINE) injection 0.5 mL  Once As Needed          07/21/25 1926    07/21/25 1925  acetaminophen (TYLENOL) tablet 650 mg  Every 4 Hours PRN         07/21/25 1926    07/21/25 1925  docusate sodium (COLACE) capsule 100 mg  2 Times Daily PRN         07/21/25 1926    07/21/25 1925  bisacodyl (DULCOLAX) suppository 10 mg  Daily PRN         07/21/25 1926    07/21/25 1925  CT Abdomen Pelvis Without Contrast  1 Time Imaging         07/21/25 1924 07/21/25 1900  cefTRIAXone (ROCEPHIN) 1,000 mg in sodium chloride 0.9 % 100 mL MBP  Once         07/21/25 1804    07/21/25 1900  sodium chloride 0.45 % bolus 1,000 mL  Once         07/21/25 1805    07/21/25 1812  CBC & Differential  STAT         07/21/25 1811    07/21/25 1812  Type & Screen  STAT         07/21/25 1811    07/21/25 1812  CBC Auto Differential  PROCEDURE ONCE         07/21/25 1811    07/21/25 1805  Insert Peripheral IV  Once         07/21/25 1804    07/21/25 1805  PERIPHERAL IV CARE - Connectors / Hubs Must Be Scrubbed 15 Seconds Using 70% Alcohol & Allowed to Dry Before Accessing Line  Continuous         07/21/25 1804    07/21/25 1804  sodium chloride 0.9 % flush 10 mL  As Needed         07/21/25 1804    07/21/25 1728  Urinalysis, Microscopic Only - Urine, Clean Catch  Once         07/21/25 1727    07/21/25 1710  Urinalysis With Microscopic If Indicated (No Culture) - Urine, Clean Catch  Once         07/21/25 1709    07/21/25 1704  ondansetron ODT (ZOFRAN-ODT) disintegrating tablet 4 mg  Every 6 Hours PRN         07/21/25 1704    Unscheduled  Change Peripheral IV Site  As Needed      Comments: Frequency Per Facility Policy    07/21/25 1804    Unscheduled  PERIPHERAL IV CARE - Change Dressing As Needed When Damp, Loose or Soiled  As Needed       07/21/25 1804    Unscheduled  Position Change - For Intra-Uterine Resusitation for Hypertonus, HyperStimulation or Non-Reassuring Fetal Status  As Needed       07/21/25 1926                     Physician Progress Notes (all)        Kentrell Dawkins MD at 07/23/25 0890           Patient Name:  Char Villatoro  YOB: 1990  [id number]    Surgery Progress Note    Date of visit: 7/23/2025    Subjective   Subjective: Feeling better this AM, tolerating PO.        Objective     Objective:     /60 (BP Location: Right arm, Patient Position: Sitting)   Pulse 88   Temp 98.9 °F (37.2 °C) (Oral)   Resp 16   LMP 01/28/2025 (Exact Date)   SpO2 95%     Intake/Output Summary (Last 24 hours) at 7/23/2025 0859  Last data filed at 7/23/2025 0500  Gross per 24 hour   Intake 900 ml   Output 1900 ml   Net -1000 ml       CV:  Rhythm  regular and rate regular   L:  Clear  to auscultation bilaterally   Abd:  Bowel sounds positive , soft, appropriately tender  Ext:  No cyanosis, clubbing, edema    Recent labs that are back at this time have been reviewed. Fetal heart tones stable and normal.           Assessment/ Plan:    Problem List Items Addressed This Visit          Gastrointestinal Abdominal     * (Principal) Acute appendicitis with localized peritonitis, without perforation, abscess, or gangrene - Primary- Doing well after Lap appendectomy. OK to go home from my standpoint. PO antibiotics to complete a 5 day course. RTC with me in 4 weeks.       Relevant Medications    oxyCODONE-acetaminophen (PERCOCET) 5-325 MG per tablet     Other Visit Diagnoses         Appendicitis        Relevant Orders    Tissue Pathology Exam             Active Hospital Problems    Diagnosis  POA    **Acute appendicitis with localized peritonitis, without perforation, abscess, or gangrene [K35.30]  Yes      Resolved Hospital Problems   No resolved problems to display.              Kentrell Dawkins MD  7/23/2025  08:59 EDT        Electronically signed by Kentrell Dawkins MD at 07/23/25 0900       Kentrell Dawkins MD at 07/22/25 6180          Patient Name:  Char Villatoro  YOB: 1990  [id number]    Surgery Post - Operative Note    Date of visit: 7/22/2025    Subjective    Subjective: Feels OK, a bit sore. Fetal heart tones stable      Objective     Objective:    /62   Pulse 102   Temp 99.5 °F (37.5 °C) (Oral)   Resp 18   LMP 2025 (Exact Date)   SpO2 95%     CV:  Rate  regular and rhythm  regular  L:  Clear  to auscultation bilaterally   ABD:  Soft, appropriately tender. Dressings clean, dry and intact   EXT:  No cyanosis, clubbing or edema            Assessment/ Plan: Doing well after Lap appendectomy. Continue Pulmonary toilet    Problem List Items Addressed This Visit    None  Visit Diagnoses         Appendicitis        Relevant Orders    Tissue Pathology Exam             Active Hospital Problems    Diagnosis  POA    **Acute appendicitis with localized peritonitis, without perforation, abscess, or gangrene [K35.30]  Yes      Resolved Hospital Problems   No resolved problems to display.            Kentrell Dawkins MD  2025  21:43 EDT      Electronically signed by Kentrell Dawkins MD at 25 2143       Eunice Hernandez MD at 25 1318          MRI suggestive of appendicitis.  Surgery consult   Continue NPO    Electronically signed by Eunice Hernandez MD at 25 1321       Eunice Hernandez MD at 25 0930          Daily Progress Note    Patient name: Char Villatoro  YOB: 1990   MRN: [id number]  Admission Date: 2025  Date of Service: 2025  Referring Provider: Eunice Hernandez MD    Char Villatoro is a 34 y.o.    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  SHe has had decreased appetite since  Sunday PM.  Reports fetal movement is normal  Denies leakage of amniotic fluid.  Denies vaginal bleeding    Objective:     Vital signs:  Temp:  [98.1 °F (36.7 °C)-98.9 °F (37.2 °C)] 98.9 °F (37.2 °C)  Heart Rate:  [90-93] 92  Resp:  [14-18] 14  BP: (109-117)/(58-72) 109/58    Abdomen: soft, tender in RLQ to palpation  Uterus: gravid, nontender  Extremities: nontender; no edema        FHT pos    Medications:  famotidine, 20 mg, Intravenous, Q12H  sodium chloride, 10 mL, Intravenous, Q12H  sodium chloride, 10 mL, Intravenous, Q12H         acetaminophen    bisacodyl    cyclobenzaprine    docusate sodium    HYDROmorphone    lidocaine PF 1%    ondansetron    ondansetron ODT    sodium chloride    sodium chloride    sodium chloride    Labs:  Lab Results (last 24 hours)       Procedure Component Value Units Date/Time    CBC & Differential [[id number]]  (Abnormal) Collected: 07/22/25 1016    Specimen: Blood Updated: 07/22/25 1039    Narrative:      The following orders were created for panel order CBC & Differential.  Procedure                               Abnormality         Status                     ---------                               -----------         ------                     CBC Auto Differential[[id number]]        Abnormal            Final result                 Please view results for these tests on the individual orders.    CBC Auto Differential [[id number]]  (Abnormal) Collected: 07/22/25 1016    Specimen: Blood Updated: 07/22/25 1039     WBC 13.07 10*3/mm3      RBC 3.70 10*6/mm3      Hemoglobin 10.9 g/dL      Hematocrit 32.6 %      MCV 88.1 fL      MCH 29.5 pg      MCHC 33.4 g/dL      RDW 12.9 %      RDW-SD 41.9 fl      MPV 10.2 fL      Platelets 174 10*3/mm3      Neutrophil % 81.1 %      Lymphocyte % 10.2 %      Monocyte % 7.8 %      Eosinophil % 0.2 %      Basophil % 0.2 %      Immature Grans % 0.5 %      Neutrophils, Absolute 10.62 10*3/mm3      Lymphocytes, Absolute 1.33 10*3/mm3      Monocytes, Absolute  1.02 10*3/mm3      Eosinophils, Absolute 0.02 10*3/mm3      Basophils, Absolute 0.02 10*3/mm3      Immature Grans, Absolute 0.06 10*3/mm3      nRBC 0.0 /100 WBC     CBC & Differential [[id number]]  (Abnormal) Collected: 07/21/25 1832    Specimen: Blood Updated: 07/21/25 1846    Narrative:      The following orders were created for panel order CBC & Differential.  Procedure                               Abnormality         Status                     ---------                               -----------         ------                     CBC Auto Differential[[id number]]        Abnormal            Final result                 Please view results for these tests on the individual orders.    CBC Auto Differential [[id number]]  (Abnormal) Collected: 07/21/25 1832    Specimen: Blood Updated: 07/21/25 1846     WBC 13.87 10*3/mm3      RBC 3.93 10*6/mm3      Hemoglobin 11.5 g/dL      Hematocrit 35.0 %      MCV 89.1 fL      MCH 29.3 pg      MCHC 32.9 g/dL      RDW 13.0 %      RDW-SD 42.1 fl      MPV 10.3 fL      Platelets 189 10*3/mm3      Neutrophil % 80.9 %      Lymphocyte % 11.6 %      Monocyte % 6.9 %      Eosinophil % 0.1 %      Basophil % 0.1 %      Immature Grans % 0.4 %      Neutrophils, Absolute 11.21 10*3/mm3      Lymphocytes, Absolute 1.61 10*3/mm3      Monocytes, Absolute 0.96 10*3/mm3      Eosinophils, Absolute 0.02 10*3/mm3      Basophils, Absolute 0.01 10*3/mm3      Immature Grans, Absolute 0.06 10*3/mm3      nRBC 0.0 /100 WBC     Urinalysis With Microscopic If Indicated (No Culture) - Urine, Clean Catch [[id number]]  (Abnormal) Collected: 07/21/25 1713    Specimen: Urine, Clean Catch Updated: 07/21/25 1729     Color, UA Dark Yellow     Appearance, UA Cloudy     pH, UA 6.5     Specific Gravity, UA >1.030     Glucose, UA Negative     Ketones, UA >=160 mg/dL (4+)     Bilirubin, UA Negative     Blood, UA Negative     Protein, UA Trace     Leuk Esterase, UA Trace     Nitrite, UA Positive     Urobilinogen, UA 1.0 E.U./dL     Urinalysis, Microscopic Only - Urine, Clean Catch [[id number]]  (Abnormal) Collected: 25 1713    Specimen: Urine, Clean Catch Updated: 25 1729     RBC, UA 0-2 /HPF      WBC, UA 11-20 /HPF      Bacteria, UA 4+ /HPF      Squamous Epithelial Cells, UA 3-6 /HPF      Hyaline Casts, UA None Seen /LPF      Methodology Automated Microscopy          Lab Results   Component Value Date    HGB 10.9 (L) 2025         Assessment/Plan:      Char is a 34 y.o.    at 25w4d.  1. RLQ abdominal pain:  continued pain in RLQ  CT neg however given lack of appetitie, continued pain and no other source for pain will proceed with MRI appy protocol  2. Continue NPO until after MRI results.  3. Fetal: FHR reassuring.      All questions were answered to the best of my ability.    Eunice Hernandez MD  2025      Electronically signed by Eunice Hernandez MD at 25 1316          Consult Notes (all)        Kentrell Dawkins MD at 25 1459        Consult Orders    1. Inpatient General Surgery Consult [[id number]] ordered by Eunice Hernandez MD at 25 1316                 Patient Name:  Char Villatoro  YOB: 1990  [id number]       Patient Care Team:  Triny King APRN as PCP - General (Nurse Practitioner)  Eunice Hernandez MD as Consulting Physician (Obstetrics and Gynecology)      General Surgery Consult Note     Date of Consultation: 25    Referring Physician : Eunice Hernandez MD    Reason for Consult : Abdominal pain    Subjective     I have been asked to see  Char Villatoro , a 34 y.o. female in consultation for abdominal pain.  She is a G2, P1 female at 25 weeks gestational age reports a 1 day history of abdominal pain in the right mid abdomen.  She describes this as sharp and stabbing.  There is associated nausea and vomiting.  There are no fevers or chills.  She denies any dysuria.  Her last bowel movement was yesterday and normal.  She presented to the hospital  and was admitted to the OB floor.  Subsequent evaluation including CT scan and MRI were concerning for acute appendicitis.  We have been asked to see her for possible surgical management.      Allergy: No Known Allergies    Medications:  famotidine, 20 mg, Intravenous, Q12H  piperacillin-tazobactam, 3.375 g, Intravenous, Once  piperacillin-tazobactam, 3.375 g, Intravenous, Q8H  [Transfer Hold] sodium chloride, 10 mL, Intravenous, Q12H  [Transfer Hold] sodium chloride, 10 mL, Intravenous, Q12H         No current facility-administered medications on file prior to encounter.     Current Outpatient Medications on File Prior to Encounter   Medication Sig    famotidine (PEPCID) 20 MG tablet Take 1 tablet by mouth 2 (Two) Times a Day. As needed    fluticasone (FLONASE) 50 MCG/ACT nasal spray Administer 2 sprays into the nostril(s) as directed by provider Daily.    Prenatal Vit-Fe Fumarate-FA (Prenatal Vitamin) 27-0.8 MG tablet Take  by mouth.       PMHx:   Past Medical History:   Diagnosis Date    Abnormal Pap smear of cervix     Disease of thyroid gland     H/O cold sores    -Obesity with a BMI of 38    Past Surgical History:  Past Surgical History:   Procedure Laterality Date    CERVICAL BIOPSY  W/ LOOP ELECTRODE EXCISION  2018     SECTION N/A 3/31/2023    Procedure:  SECTION PRIMARY;  Surgeon: Yuridia Lu MD;  Location: [facility] LABOR DELIVERY;  Service: Obstetrics/Gynecology;  Laterality: N/A;    LEE  2018    WISDOM TOOTH EXTRACTION  2019   - Ankle surgery      Family History: Noncontributory     Social History: Pt lives in [place].  .    Tobacco use: Denies     EtOH use : Denies    Illicit drug use: Denies      Review of Systems        Constitutional: No fevers, chills or malaise   Eyes: Denies visual changes    Cardiovascular: Denies chest pain, palpitations   Pulmonary: Denies cough or shortness of breath   Abdominal/ GI: See HPI    Genitourinary: Denies dysuria or  hematuria   Musculoskeletal: Denies any but chronic joint aches, pains or deformities   Psychiatric: No recent mood changes   Neurologic: No paresthesias or loss of function         Objective     Physical Exam:      Vital Signs  /70 (BP Location: Right arm, Patient Position: Lying)   Pulse 92   Temp 97.7 °F (36.5 °C) (Temporal)   Resp 15   LMP 01/28/2025 (Exact Date)   SpO2 96%     Intake/Output Summary (Last 24 hours) at 7/22/2025 1500  Last data filed at 7/22/2025 0453  Gross per 24 hour   Intake --   Output 500 ml   Net -500 ml         Physical Exam:    Head: Normocephalic, atraumatic.   Eyes: Pupils equal, round, react to light and accommodation.   Mouth: Oral mucosa without lesions,   Neck: No masses, lymphadenopathy or carotid bruits bilaterally   CV: Rhythm  and rate regular , no  murmurs, rubs or gallops  Lungs: Clear  to auscultation bilaterally   Abdomen: Bowel sounds positive  , soft, gravid with fundal height above the umbilicus.  Tender in the right abdomen.  Groin : No obvious hernias bilaterally   Extremities:  No cyanosis, clubbing or edema bilaterally   Lymphatics: No abnormal lymphadenopathy appreciated   Neurologic: No gross deficits       Results Review: I have personally reviewed all of the recent lab and imaging results available at this time.  Laboratory exam reveals a white count of 13,000 with a left shift.  Hemoglobin is 10.9 with a plate count of 174.  Urinalysis shows positive nitrites trace leukocytes 4+ bacteria but 3-6 epithelial cells.  CT scan of the abdomen and pelvis was personally viewed by me as well as the final dictated and edited report.  This shows a gravid uterus.  I do see evidence of acute appendicitis with some appendicoliths in the right mid abdomen.  There is no free air or free fluid.  There is inflammatory change.  This is most consistent with appendicitis.  Also reviewed was the MRI from today which again confirms acute appendicitis.          Assessment  and Plan:    Acute appendicitis in pregnancy- She has evidence of acute appendicitis.  I think the best course of action would be for a laparoscopic appendectomy today.  I discussed the risks and benefits including the risk to the fetus at length with the patient and her , and they are agreeable to proceed.  We will proceed emergently today.        Active Hospital Problems    Diagnosis  POA    **RLQ abdominal pain [R10.31]  Yes      Resolved Hospital Problems   No resolved problems to display.            I discussed the patient's findings and my recommendations with the patient and/or family, as well as the primary team     Kentrell Dawkins MD  07/22/25  15:00 EDT        Dictated using Dragon Dictation      Electronically signed by Kentrell Dawkins MD at 07/22/25 2546

## 2025-07-24 LAB
CYTO UR: NORMAL
LAB AP CASE REPORT: NORMAL
LAB AP CLINICAL INFORMATION: NORMAL
PATH REPORT.FINAL DX SPEC: NORMAL
PATH REPORT.GROSS SPEC: NORMAL

## 2025-08-12 ENCOUNTER — ROUTINE PRENATAL (OUTPATIENT)
Dept: OBSTETRICS AND GYNECOLOGY | Facility: CLINIC | Age: 35
End: 2025-08-12
Payer: COMMERCIAL

## 2025-08-12 VITALS — SYSTOLIC BLOOD PRESSURE: 104 MMHG | BODY MASS INDEX: 38.83 KG/M2 | DIASTOLIC BLOOD PRESSURE: 66 MMHG | WEIGHT: 255.4 LBS

## 2025-08-12 DIAGNOSIS — Z98.891 PREVIOUS CESAREAN SECTION: Primary | ICD-10-CM

## 2025-08-12 DIAGNOSIS — Z34.83 PRENATAL CARE, SUBSEQUENT PREGNANCY IN THIRD TRIMESTER: ICD-10-CM

## 2025-08-12 DIAGNOSIS — Z98.890 H/O LEEP: ICD-10-CM

## 2025-08-12 DIAGNOSIS — O99.210 OBESITY IN PREGNANCY, ANTEPARTUM: ICD-10-CM

## (undated) DEVICE — LAPAROVUE VISIBILITY SYSTEM LAPAROSCOPIC SOLUTIONS: Brand: LAPAROVUE

## (undated) DEVICE — GLV SURG SENSICARE PI MIC PF SZ7.5 LF STRL

## (undated) DEVICE — ST TBG CONN PNEUMOCLEAR EVAC SMOKE HEAT/HUMID

## (undated) DEVICE — MARYLAND JAW LAPAROSCOPIC SEALER/DIVIDER COATED: Brand: LIGASURE

## (undated) DEVICE — ENDOPATH XCEL UNIVERSAL TROCAR STABLILITY SLEEVES: Brand: ENDOPATH XCEL

## (undated) DEVICE — 3M™ STERI-STRIP™ REINFORCED ADHESIVE SKIN CLOSURES, R1547, 1/2 IN X 4 IN (12 MM X 100 MM), 6 STRIPS/ENVELOPE: Brand: 3M™ STERI-STRIP™

## (undated) DEVICE — 4-PORT MANIFOLD: Brand: NEPTUNE 2

## (undated) DEVICE — GOWN SURG ORBIS LVL3 2XL STRL

## (undated) DEVICE — APPL CHLORAPREP TINTED 26ML TEAL

## (undated) DEVICE — SUT VIC 3/0 PS2 27IN J427H

## (undated) DEVICE — PATIENT RETURN ELECTRODE, SINGLE-USE, CONTACT QUALITY MONITORING, ADULT, WITH 9FT CORD, FOR PATIENTS WEIGING OVER 33LBS. (15KG): Brand: MEGADYNE

## (undated) DEVICE — GLV SURG BIOGEL LTX PF 7 1/2

## (undated) DEVICE — PK C/SECT 10

## (undated) DEVICE — BLANKT WARM UPPR/BDY ARM/OUT 57X196CM

## (undated) DEVICE — SOL IRR H2O BTL 1000ML STRL

## (undated) DEVICE — SUT VIC 0 UR6 27IN VCP603H

## (undated) DEVICE — ANTIBACTERIAL UNDYED BRAIDED (POLYGLACTIN 910), SYNTHETIC ABSORBABLE SUTURE: Brand: COATED VICRYL

## (undated) DEVICE — PROXIMATE RH ROTATING HEAD SKIN STAPLERS (35 WIDE) CONTAINS 35 STAINLESS STEEL STAPLES: Brand: PROXIMATE

## (undated) DEVICE — SUT GUT CHRM 1 CTX 36IN 905H

## (undated) DEVICE — SOL IRR NACL 0.9PCT BT 1000ML

## (undated) DEVICE — TBG PENCL TELESCP MEGADYNE SMOKE EVAC 10FT

## (undated) DEVICE — GLV SURG SENSICARE PI MIC PF SZ7 LF STRL

## (undated) DEVICE — ENDOPOUCH RETRIEVER SPECIMEN RETRIEVAL BAGS: Brand: ENDOPOUCH RETRIEVER

## (undated) DEVICE — SUT VIC 2/0 CT1 27IN J339H BX/36

## (undated) DEVICE — MAT PREVALON MOBL TRANSFR AIR W/PAD REPROC 39X81IN

## (undated) DEVICE — SUT SILK 3/0 SH 30IN K832H

## (undated) DEVICE — ENDOPATH XCEL BLUNT TIP TROCARS WITH SMOOTH SLEEVES: Brand: ENDOPATH XCEL

## (undated) DEVICE — BOWL UTIL STRL 32OZ

## (undated) DEVICE — ENDOCUT SCISSOR TIP, DISPOSABLE: Brand: RENEW

## (undated) DEVICE — TRY SPINE BLCK WHITACRE 25G 3X5IN

## (undated) DEVICE — COATED VICRYL  (POLYGLACTIN 910) SUTURE, VIOLET BRAIDED, STERILE, SYNTHETIC ABSORBABLE SUTURE: Brand: COATED VICRYL

## (undated) DEVICE — PK LAP LASR CHOLE 10

## (undated) DEVICE — SYR CONTRL LUERLOK 10CC

## (undated) DEVICE — SUT PLAIN  3/0 CT1 27IN 842H

## (undated) DEVICE — ADHS LIQ MASTISOL 2/3ML

## (undated) DEVICE — TRAP FLD MINIVAC MEGADYNE 100ML

## (undated) DEVICE — ENDOPATH XCEL BLADELESS TROCARS WITH STABILITY SLEEVES: Brand: ENDOPATH XCEL

## (undated) DEVICE — CLTH CLENS READYCLEANSE PERI CARE PK/5